# Patient Record
Sex: MALE | Race: ASIAN | NOT HISPANIC OR LATINO | Employment: FULL TIME | URBAN - METROPOLITAN AREA
[De-identification: names, ages, dates, MRNs, and addresses within clinical notes are randomized per-mention and may not be internally consistent; named-entity substitution may affect disease eponyms.]

---

## 2017-01-07 ENCOUNTER — TELEPHONE (OUTPATIENT)
Dept: MEDICAL GROUP | Facility: MEDICAL CENTER | Age: 42
End: 2017-01-07

## 2017-01-07 NOTE — TELEPHONE ENCOUNTER
(1) we requested an overnight pulse oximetry apnea link study on the patient and that was faxed to [facility] on June 4, 2016.  I did not receive any notification whether or not the study was performed or not, I did not receive any report.  Please contact [facility] and asked to speak to an  regarding this.    (2) we have received 3 request from the patient's insurance company regarding release of records to them from June 2016 forward, I did not sign off on any of those requests and they were scanned into the medial section.  I am not sure if these requests were ever forwarded to the medical release department.    (3) please check with the medical records department to see if they received the release requests for the pertinent records, these are in the media section.  The patient sent me a [employer] message inquiring about the records release, and why we have not released those records to his insurance company, in particular with regards to his overnight sleep study from [facility].

## 2017-01-09 ENCOUNTER — TELEPHONE (OUTPATIENT)
Dept: MEDICAL GROUP | Facility: MEDICAL CENTER | Age: 42
End: 2017-01-09

## 2017-01-09 NOTE — TELEPHONE ENCOUNTER
11/7/16 bilateral heel pain ×2 months, x-rays pending, labs pending, trial of Aleve 2 tablets twice a day over-the-counter  11/9/16 xray calcaneus bilateral small right posterior calcaneal spur and left posterior calcaneal spur  11/9/16 uric 6.4

## 2017-01-09 NOTE — TELEPHONE ENCOUNTER
Please notify patient that his sleep test done by [facility] in july was normal, and we apologize that the company did not send us the results.    We will send this information to his insurance company

## 2017-01-09 NOTE — TELEPHONE ENCOUNTER
1) Spoke with [facility]. They are faxing over results STAT.    2)  is contacting Med Records to see why this request has not been processed.

## 2017-04-06 ENCOUNTER — OFFICE VISIT (OUTPATIENT)
Dept: MEDICAL GROUP | Facility: MEDICAL CENTER | Age: 42
End: 2017-04-06
Payer: COMMERCIAL

## 2017-04-06 VITALS
SYSTOLIC BLOOD PRESSURE: 128 MMHG | TEMPERATURE: 97.5 F | DIASTOLIC BLOOD PRESSURE: 74 MMHG | WEIGHT: 214 LBS | HEART RATE: 75 BPM | HEIGHT: 73 IN | OXYGEN SATURATION: 95 % | BODY MASS INDEX: 28.36 KG/M2

## 2017-04-06 DIAGNOSIS — B34.9 VIRAL SYNDROME: ICD-10-CM

## 2017-04-06 PROCEDURE — 99213 OFFICE O/P EST LOW 20 MIN: CPT | Performed by: INTERNAL MEDICINE

## 2017-04-06 ASSESSMENT — PATIENT HEALTH QUESTIONNAIRE - PHQ9: CLINICAL INTERPRETATION OF PHQ2 SCORE: 0

## 2017-04-06 NOTE — Clinical Note
April 6, 2017        Dear Sir or ,      Please excuse Orlando Mercer from work April 7th through April 9th, due to a medical illness.            Sincerely,        Dheerajhector Burr M.D.    Electronically Signed

## 2017-04-06 NOTE — PROGRESS NOTES
"Subjective:      Orlando Mercer is a 41 y.o. male who presents with sore throat        HPI    Over the weekend started to have sore throat, and body aches, chills, runny nose, cough has resolved, dry cough, no hemoptysis, some sinus congestion, no sob, no ear pressure, no dizziness. No tinnitus. Also has had stomach tightness epigastric area which has gone away and more just queasy, would be more noticeable with meals, some bloating at times, tried pepto bismol and gas-ex, has noticed the GI symptoms this week, did have some loose stools this week, formed stool yesterday no blood, prior to this bowel movements have been fine, has been eating out a lot more  Able to keep liquids down   More weakness and fatigue, has been sleeping more this week and decrease energy  Did try allergy otc med and nasal spray no benefit  Sick exposures at work  No recent antibiotics          Current Outpatient Prescriptions   Medication Sig Dispense Refill   • meclizine (ANTIVERT) 25 MG Tab Take 1 Tab by mouth 3 times a day as needed for Vertigo. 12 Tab 2   • diazepam (VALIUM) 5 MG Tab Take 1 Tab by mouth every 6 hours as needed (vertigo). 10 Tab 0     No current facility-administered medications for this visit.       Patient Active Problem List    Diagnosis Date Noted   • Refractive amblyopia 04/13/2016   • Erectile dysfunction 12/28/2015   • Dyslipidemia 09/18/2011   • Preventative health care 09/16/2011   • Family history of brain aneurysm 05/27/2010   • Tension headache 06/05/2009     Depression Screening    Little interest or pleasure in doing things?  0 - not at all  Feeling down, depressed , or hopeless? 0 - not at all  Patient Health Questionnaire Score: 0     ROS       Objective:     /74 mmHg  Pulse 75  Temp(Src) 36.4 °C (97.5 °F)  Ht 1.854 m (6' 0.99\")  Wt 97.07 kg (214 lb)  BMI 28.24 kg/m2  SpO2 95%     Physical Exam   Constitutional: He appears well-developed and well-nourished. No distress.   HENT:   Head: " Normocephalic and atraumatic.   Right Ear: External ear normal.   Left Ear: External ear normal.   Nose: Nose normal.   Mouth/Throat: Oropharynx is clear and moist.   Eyes: Conjunctivae are normal. Right eye exhibits no discharge. Left eye exhibits no discharge. No scleral icterus.   Neck: Neck supple. No JVD present. No thyromegaly present.   Cardiovascular: Normal rate and regular rhythm.    Pulmonary/Chest: Effort normal and breath sounds normal. No respiratory distress. He has no wheezes.   Abdominal: Soft. Bowel sounds are normal. He exhibits no distension and no mass.   Skin: Skin is dry. No rash noted. He is not diaphoretic.   Psychiatric: He has a normal mood and affect. His behavior is normal.   Nursing note and vitals reviewed.              Assessment/Plan:     Assessment  #1 resolving viral syndrome, no evidence of sinusitis, pneumonia, otitis, pharyngitis, GI symptoms are resolving as well as no evidence of acute abdomen, cholecystitis or appendicitis      Plan  #1 rest, hydration, off work ×3 days    #2 no antibiotics    #3 notify us if symptoms recur

## 2017-06-22 ENCOUNTER — TELEPHONE (OUTPATIENT)
Dept: MEDICAL GROUP | Facility: MEDICAL CENTER | Age: 42
End: 2017-06-22

## 2017-06-23 NOTE — TELEPHONE ENCOUNTER
Spoke to Pt, he was having diarrhea for over 48 hours with no other symptoms. Pt took Imodium and reports he did not have a bowel movement last night as well as nothing as of yet this morning. Pt understanding of need to be seen at Urgent care if any other symptoms occur (fever, nausea, vomiting or abdominal pain). Pt will call office with any further needs and reports he is staying hydrated.

## 2017-06-23 NOTE — TELEPHONE ENCOUNTER
"\"Hi Dr Burr. I'm writing on behalf of Orlando MADISON Ruslan 1975. He does not have MyChart.   We're in [place] & Orlando has been having diarrhea since Tuesday this week. You can also speak to him directly at [phone].     The pharmacy here is [facility]: [address], [place] [address]. Phone:[phone]  The soonest you're able to see him is in Tuesday next week. Thank you so much!\"     We received this message from the patient's girlfriend in her [employer] account.  Please call the patient, and ask him if he is having nausea, vomiting, abdominal pain, fevers.  If so he should be seen at the walk-in clinic or urgent care in [place].  If it is just loose stools he can try over-the-counter Imodium, making sure to hydrate with Pedialyte.  "

## 2017-08-01 ENCOUNTER — TELEPHONE (OUTPATIENT)
Dept: MEDICAL GROUP | Facility: MEDICAL CENTER | Age: 42
End: 2017-08-01

## 2017-08-01 ENCOUNTER — OFFICE VISIT (OUTPATIENT)
Dept: MEDICAL GROUP | Facility: MEDICAL CENTER | Age: 42
End: 2017-08-01
Payer: COMMERCIAL

## 2017-08-01 VITALS
TEMPERATURE: 97.9 F | BODY MASS INDEX: 30.48 KG/M2 | WEIGHT: 225 LBS | HEIGHT: 72 IN | HEART RATE: 65 BPM | OXYGEN SATURATION: 96 % | DIASTOLIC BLOOD PRESSURE: 76 MMHG | SYSTOLIC BLOOD PRESSURE: 120 MMHG

## 2017-08-01 DIAGNOSIS — H53.8 BLURRY VISION: ICD-10-CM

## 2017-08-01 DIAGNOSIS — G44.209 TENSION HEADACHE: Chronic | ICD-10-CM

## 2017-08-01 DIAGNOSIS — J30.1 ALLERGIC RHINITIS DUE TO POLLEN, UNSPECIFIED RHINITIS SEASONALITY: ICD-10-CM

## 2017-08-01 DIAGNOSIS — H53.9 VISION DISTURBANCE: ICD-10-CM

## 2017-08-01 DIAGNOSIS — G44.209 TENSION-TYPE HEADACHE, NOT INTRACTABLE, UNSPECIFIED CHRONICITY PATTERN: ICD-10-CM

## 2017-08-01 DIAGNOSIS — Z00.00 PREVENTATIVE HEALTH CARE: Chronic | ICD-10-CM

## 2017-08-01 PROBLEM — J30.9 ALLERGIC RHINITIS: Status: ACTIVE | Noted: 2017-08-01

## 2017-08-01 PROCEDURE — 99214 OFFICE O/P EST MOD 30 MIN: CPT | Performed by: INTERNAL MEDICINE

## 2017-08-01 RX ORDER — NORTRIPTYLINE HYDROCHLORIDE 10 MG/1
10 CAPSULE ORAL EVERY EVENING
Qty: 30 CAP | Refills: 3 | Status: SHIPPED | OUTPATIENT
Start: 2017-08-01 | End: 2018-05-11

## 2017-08-01 RX ORDER — IPRATROPIUM BROMIDE 21 UG/1
2 SPRAY, METERED NASAL 2 TIMES DAILY
Qty: 1 BOTTLE | Refills: 6 | Status: SHIPPED | OUTPATIENT
Start: 2017-08-01 | End: 2018-06-09

## 2017-08-01 ASSESSMENT — ENCOUNTER SYMPTOMS
BLURRED VISION: 1
HEADACHES: 1

## 2017-08-01 NOTE — PROGRESS NOTES
Subjective:      Orlando Mercer is a 41 y.o. male who presents with Headache and Blurred Vision            Headache   Associated symptoms include blurred vision.   Blurred Vision  Associated symptoms include headaches.       Review of Systems   Eyes: Positive for blurred vision.   Neurological: Positive for headaches.     Has been having more headaches neck area bilateral with some radiation to the scalp, temporally will have headaches more noticeable in mornings, no new pillow or mattress, no radiation down the arms, no sensory changes hands or feet. More vision changes left eye, chronic right eye blurriness, no visual loss, no eye pain, no double vision, no glasses or contacts. Has had three episodes with blurry vision occuring that resolves.  No associated dizziness.  No temperature change.  No hearing loss.  One episode when occurred flashing light with no visual loss, no nausea, no photophobia, with no subsequent headache, then lay down and symptoms resolve. No migraine headache history. Headaches will feel like tension headache, no sharp, no associated nausea, will occur 5 days per week, no precipitating factors, has tried aleve or motrin or tylenol. Headaches seem to occur more frequently at work. Did change pillows recently. 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    No difficulty with balance or coordination  No falls  No sensory changes  No bowel or bladder incontinence   No diff with speech or swallow   No trauma, no fever, no sore throat  Has been having more allergies hay fever runny nose and sinus congestion, tried nasal spray otc flonase no benefit, tried oral antihistamine no benefit  Dental work recently last 6 months, also tooth grinding and uses mouth guard infrequently due to poor fit, bought an otc mouthguard since fit better, no noticeable affect of tooth grinding with headaches    No history of seizures, no history current concussions, no mental status changes  No difficulty with  "memory and focus  No regular alcohol           Objective:     /76 mmHg  Pulse 65  Temp(Src) 36.6 °C (97.9 °F)  Ht 1.829 m (6' 0.01\")  Wt 102.059 kg (225 lb)  BMI 30.51 kg/m2  SpO2 96%     Physical Exam   Constitutional: He appears well-developed and well-nourished. No distress.   HENT:   Head: Normocephalic and atraumatic.   Right Ear: External ear normal.   Left Ear: External ear normal.   Mouth/Throat: Oropharynx is clear and moist.   Eyes: Conjunctivae and EOM are normal. Pupils are equal, round, and reactive to light. Right eye exhibits no discharge. Left eye exhibits no discharge. No scleral icterus.   Neck: Normal range of motion. Neck supple. No JVD present. No thyromegaly present.   Cardiovascular: Normal rate, regular rhythm and normal heart sounds.    No murmur heard.  Pulmonary/Chest: Effort normal and breath sounds normal. No respiratory distress. He has no wheezes.   Skin: Skin is warm. He is not diaphoretic.   Psychiatric: He has a normal mood and affect. His behavior is normal.   Nursing note and vitals reviewed.    Fundi without exudate  Extra ocular movements intact  Visual acuity to confrontation intact bilateral  No mastoid tenderness, no sinus tenderness, mild nasal congestion, epistaxis  Cranial nerves intact, normal finger-to-nose, negative Romberg  Normal strength upper and lower extremities, normal cervical range of motion, no cervical adenopathy or thyromegaly  No rash or shingles  Normal affect, insight, judgment  No tenderness to palpation cervical spine, mild tenderness occipital region bilateral  No TMJ tenderness          Assessment/Plan:     Assessment  #!  Tension headache to restarting back of the neck radiating to the midportion of his head, one episode of visual changes with shimmering rainbow like affect left eye question atypical migraine, no history of retinal detachment, that has not occurred since, some decreased visual acuity left eye times unrelated to headache, " no visual loss, no double vision, no sensory changes, no strength changes, difficulty with speech, swallowing, balance, no evidence of sinusitis, no focal neurologic deficits, possibly related to work stress    #2 left visual disturbance periodically, occasional blurry vision, no loss of vision, no double vision     #3 tooth grinding possibly underlying contributory factor to tension headache unable to tolerate mouthguard from dentist    #4 history of refractive amblyopia followed by ophthalmology    #5 allergic rhinitis has tried fluticasone and over-the-counter oral anti-histamines without benefit        Plan  #1 no imaging necessary at this time    #2 trial of nortriptyline 10 mg by mouth every evening, medication may cause dry eyes, dry mouth, constipation, daytime drowsiness, may take 3-4 weeks from benefit, notify us if side effects    #3 can use ibuprofen or Aleve for breakthrough headaches    #4 mouthguard, follow-up with dentist    #5 referral back to ophthalmology    #6 trial of Atrovent nasal spray 2 sprays each nostril twice a day, has tried over-the-counter Flonase and over-the-counter or antihistamines with no benefit    #7 notify us for worsening symptoms    #8 follow-up 2 months

## 2017-08-02 NOTE — TELEPHONE ENCOUNTER
Need old records from     [facility]  [address]  [address]  Phone: [phone]

## 2017-09-19 ENCOUNTER — HOSPITAL ENCOUNTER (OUTPATIENT)
Dept: LAB | Facility: MEDICAL CENTER | Age: 42
End: 2017-09-19
Payer: COMMERCIAL

## 2017-09-19 LAB
BDY FAT % MEASURED: 26.7 %
BP DIAS: 80 MMHG
BP SYS: 125 MMHG
CHOLEST SERPL-MCNC: 210 MG/DL (ref 100–199)
DIABETES HTDIA: NO
EVENT NAME HTEVT: NORMAL
FASTING HTFAS: YES
GLUCOSE SERPL-MCNC: 93 MG/DL (ref 65–99)
HDLC SERPL-MCNC: 42 MG/DL
HYPERTENSION HTHYP: NO
LDLC SERPL CALC-MCNC: 120 MG/DL
SCREENING LOC CITY HTCIT: NORMAL
SCREENING LOC STATE HTSTA: NORMAL
SCREENING LOCATION HTLOC: NORMAL
SMOKING HTSMO: NO
SUBSCRIBER ID HTSID: NORMAL
TRIGL SERPL-MCNC: 240 MG/DL (ref 0–149)

## 2017-09-19 PROCEDURE — 82947 ASSAY GLUCOSE BLOOD QUANT: CPT

## 2017-09-19 PROCEDURE — 36415 COLL VENOUS BLD VENIPUNCTURE: CPT

## 2017-09-19 PROCEDURE — 80061 LIPID PANEL: CPT

## 2017-09-19 PROCEDURE — S5190 WELLNESS ASSESSMENT BY NONPH: HCPCS

## 2017-10-02 ENCOUNTER — EH NON-PROVIDER (OUTPATIENT)
Dept: OCCUPATIONAL MEDICINE | Facility: CLINIC | Age: 42
End: 2017-10-02

## 2017-10-02 DIAGNOSIS — Z29.89 NEED FOR ISOLATION: ICD-10-CM

## 2017-10-02 PROCEDURE — 94375 RESPIRATORY FLOW VOLUME LOOP: CPT | Performed by: PREVENTIVE MEDICINE

## 2017-11-08 ENCOUNTER — IMMUNIZATION (OUTPATIENT)
Dept: OCCUPATIONAL MEDICINE | Facility: CLINIC | Age: 42
End: 2017-11-08

## 2017-11-08 DIAGNOSIS — Z23 NEED FOR VACCINATION: ICD-10-CM

## 2017-11-08 PROCEDURE — 90686 IIV4 VACC NO PRSV 0.5 ML IM: CPT | Performed by: PREVENTIVE MEDICINE

## 2017-12-04 ENCOUNTER — OFFICE VISIT (OUTPATIENT)
Dept: MEDICAL GROUP | Facility: MEDICAL CENTER | Age: 42
End: 2017-12-04
Payer: COMMERCIAL

## 2017-12-04 VITALS
OXYGEN SATURATION: 99 % | BODY MASS INDEX: 30.75 KG/M2 | HEIGHT: 72 IN | WEIGHT: 227 LBS | TEMPERATURE: 97.2 F | DIASTOLIC BLOOD PRESSURE: 70 MMHG | HEART RATE: 81 BPM | SYSTOLIC BLOOD PRESSURE: 128 MMHG

## 2017-12-04 DIAGNOSIS — J30.1 ALLERGIC RHINITIS DUE TO POLLEN, UNSPECIFIED CHRONICITY, UNSPECIFIED SEASONALITY: ICD-10-CM

## 2017-12-04 DIAGNOSIS — G44.209 TENSION HEADACHE: Chronic | ICD-10-CM

## 2017-12-04 DIAGNOSIS — M54.2 NECK PAIN: ICD-10-CM

## 2017-12-04 DIAGNOSIS — H53.9 VISION DISTURBANCE: ICD-10-CM

## 2017-12-04 PROCEDURE — 99213 OFFICE O/P EST LOW 20 MIN: CPT | Performed by: INTERNAL MEDICINE

## 2017-12-04 NOTE — PROGRESS NOTES
Subjective:      Orlando Mercer is a 42 y.o. male who presents with sinus          HPI  Has follow up pending with  ophthalmology will have occasional episodes of blurry vision both eyes and episodes of flashes periphery left side which may occur together, no visual loss, also clenches teeth and will grind teeth at night since had tooth crowns a few years ago, has mouth guard and will try to use the mouth guard but does not use the one from his dentist due to being ill fitting. Will have sinus congestion and runny nose off and on, has tried atrovent nasal with no benefit.   Off pamelor for tension headache start in back of head with no radiation down the arms, no sensory changes hands or face, some radiation to front of scalp at times, no fever or chills, no dizziness, no ear pain, no tinnitus, no hearing loss  Off atrovent nasal  No anxiety or depression  No history cervical radiculopathy  No tobacco, no alcohol    Current Outpatient Prescriptions   Medication Sig Dispense Refill   • ipratropium (ATROVENT) 0.03 % Solution Spray 2 Sprays in nose 2 times a day. 1 Bottle 6   • nortriptyline (PAMELOR) 10 MG Cap Take 1 Cap by mouth every evening. 30 Cap 3     No current facility-administered medications for this visit.      Patient Active Problem List   Diagnosis   • Tension headache   • Family history of brain aneurysm   • Preventative health care   • Dyslipidemia   • Erectile dysfunction   • Refractive amblyopia   • Allergic rhinitis       ADHD  11/18/14 positive screening test, offer psychotherapy and medications he declines    Dyslipidemia  9/11 chol 201,trig 179,hdl 37,ldl 128 start fish oil 2 bid  8/12 chol 183,trig 165,hdl 36,ldl 114  9/14/13 chol 208,trig 308,hdl 37,ldl 109  9/4/14 chol 210,trig 329,hdl 37,ldl 107  6/14/16 chol 220,trig 315,hdl 40,ldl 117  9/19/17 chol 210,trig 240,hdl 42,ldl 120     Family history brain aneurysm  In father  6/10 MRI/MRA brain negative, report mentioned abn right frontal, I  spoke to  on the phone who reviewed the scans, no sig abnormality    Preventative health  9/12 tdap  6/14/16 vit d 24 start 2000 units  7/5/16 apnea link negative    Refractive amblyopia  3/1/16  eye exam, also dry eyes     Tension headache  12/09 acupuncture referral, if no benefit 4 weeks consider MRI of brain.  6/10 MRI/MRA brain negative, report mentioned abnormal right frontal, I spoke to  on the phone who reviewed the scans, no significant abnormality  4/4/16 continued pain above the left orbit only tender with palpation, has seen [facility], negative examination, will get CT orbit  4/18/16 CT orbit sella without plus reconstruction negative  8/1/17 start pamelor 10 mg            Health Maintenance Summary                IMM DTaP/Tdap/Td Vaccine Next Due 9/21/2022      Done 9/21/2012 Imm Admin: Tdap Vaccine          Patient Care Team:  Dheeraj Burr M.D. as PCP - General            ROS       Objective:        Physical Exam   Constitutional: He appears well-developed and well-nourished. No distress.   HENT:   Head: Normocephalic and atraumatic.   Right Ear: External ear normal.   Left Ear: External ear normal.   Nose: Nose normal.   Mouth/Throat: Oropharynx is clear and moist. No oropharyngeal exudate.   Eyes: Conjunctivae and EOM are normal. Pupils are equal, round, and reactive to light. Right eye exhibits no discharge. Left eye exhibits no discharge. No scleral icterus.   Neck: Neck supple. No JVD present.   Cardiovascular: Normal rate, regular rhythm and normal heart sounds.    Pulmonary/Chest: Effort normal and breath sounds normal. No respiratory distress. He has no wheezes.   Abdominal: He exhibits no distension.   Musculoskeletal: He exhibits no edema.   Neurological: He is alert. No cranial nerve deficit. Coordination normal.   Skin: Skin is warm. He is not diaphoretic.   Psychiatric: He has a normal mood and affect. His behavior is normal.   Nursing note and vitals  reviewed.    No sinus tenderness, no mastoid tenderness, no TMJ tenderness  Normal cervical range of motion  Cranial nerves intact  No tenderness to palpation trapezius, occipital, paraspinal cervical muscles          Assessment/Plan:     Assessment  #! Vision disturbance occasional blurry vision, has upcoming appointment with ophthalmology     #2 teeth clenching and occasional tooth grinding, has mouth guard at home    #3 neck pain musculoskeletal in nature    #4 tension headache , tried Pamelor no benefit    #5 allergic rhinitis has tried Atrovent, Nasonex, Astelin    Plan  #1 follow up  ophthalmology    #2 follow-up with dentist, consider different mouthguard    #3 acupuncture referral teeth clenching, neck pain, tension headache, allergic rhinitis    #4 nasal rinse    #5 declines Singulair    #6 meditation, relaxation therapy, massage therapy    #7 follow-up 3 months

## 2018-02-07 ENCOUNTER — HOSPITAL ENCOUNTER (OUTPATIENT)
Dept: RADIOLOGY | Facility: MEDICAL CENTER | Age: 43
End: 2018-02-07
Attending: OPHTHALMOLOGY
Payer: COMMERCIAL

## 2018-02-07 DIAGNOSIS — G43.001 MIGRAINE WITHOUT AURA AND WITH STATUS MIGRAINOSUS, NOT INTRACTABLE: ICD-10-CM

## 2018-02-07 PROCEDURE — A9577 INJ MULTIHANCE: HCPCS | Performed by: OPHTHALMOLOGY

## 2018-02-07 PROCEDURE — 700117 HCHG RX CONTRAST REV CODE 255: Performed by: OPHTHALMOLOGY

## 2018-02-07 PROCEDURE — 70553 MRI BRAIN STEM W/O & W/DYE: CPT

## 2018-02-07 RX ADMIN — GADOBENATE DIMEGLUMINE 10 ML: 529 INJECTION, SOLUTION INTRAVENOUS at 12:15

## 2018-02-14 ENCOUNTER — PATIENT MESSAGE (OUTPATIENT)
Dept: MEDICAL GROUP | Facility: MEDICAL CENTER | Age: 43
End: 2018-02-14

## 2018-02-14 DIAGNOSIS — H53.9 VISION DISTURBANCE: ICD-10-CM

## 2018-02-15 NOTE — TELEPHONE ENCOUNTER
From: Gurmeet Mercer  To: Dheeraj Burr M.D.  Sent: 2/14/2018 11:33 AM PST  Subject: Non-Urgent Medical Question    Hi Dr Burr,  I have an appt to check back with Dr Cecelia Gutierrez but I guess my original referral was only for one visit? Please send for additional visit      Thank you

## 2018-04-17 ENCOUNTER — OFFICE VISIT (OUTPATIENT)
Dept: URGENT CARE | Facility: CLINIC | Age: 43
End: 2018-04-17
Payer: COMMERCIAL

## 2018-04-17 VITALS
SYSTOLIC BLOOD PRESSURE: 120 MMHG | OXYGEN SATURATION: 98 % | BODY MASS INDEX: 30.61 KG/M2 | HEART RATE: 72 BPM | DIASTOLIC BLOOD PRESSURE: 80 MMHG | RESPIRATION RATE: 18 BRPM | WEIGHT: 226 LBS | HEIGHT: 72 IN | TEMPERATURE: 98 F

## 2018-04-17 DIAGNOSIS — J02.9 SORE THROAT: ICD-10-CM

## 2018-04-17 DIAGNOSIS — J02.9 VIRAL PHARYNGITIS: ICD-10-CM

## 2018-04-17 LAB
INT CON NEG: NORMAL
INT CON POS: NORMAL
S PYO AG THROAT QL: NEGATIVE

## 2018-04-17 PROCEDURE — 99213 OFFICE O/P EST LOW 20 MIN: CPT | Performed by: NURSE PRACTITIONER

## 2018-04-17 PROCEDURE — 87880 STREP A ASSAY W/OPTIC: CPT | Performed by: NURSE PRACTITIONER

## 2018-04-17 ASSESSMENT — ENCOUNTER SYMPTOMS
FEVER: 0
COUGH: 0
SWOLLEN GLANDS: 1
HOARSE VOICE: 1
SORE THROAT: 1

## 2018-04-17 NOTE — PROGRESS NOTES
Subjective:      Gurmeet Mercer is a 42 y.o. male who presents with Pharyngitis (Couple days sorethroat )            Pharyngitis    This is a new problem. Episode onset: Pt reports onset of ST 2 days ago. He admits his throat is the most painful in the evening and first thing in the morning. He is unsure if he has ever had strep. Denies any fever. He has not taken anything for the pain. The problem has been unchanged. Neither side of throat is experiencing more pain than the other. There has been no fever. Associated symptoms include a hoarse voice and swollen glands. Pertinent negatives include no congestion or coughing.       Review of Systems   Constitutional: Negative for fever.   HENT: Positive for hoarse voice and sore throat. Negative for congestion.    Respiratory: Negative for cough.    All other systems reviewed and are negative.    Past Medical History:   Diagnosis Date   • Plantar fasciitis 6/8/2009   • Tension headache 6/5/2009    No past surgical history on file.   Social History     Social History   • Marital status:      Spouse name: N/A   • Number of children: N/A   • Years of education: N/A     Occupational History   • Not on file.     Social History Main Topics   • Smoking status: Former Smoker   • Smokeless tobacco: Never Used   • Alcohol use 0.0 oz/week   • Drug use: No   • Sexual activity: Not on file     Other Topics Concern   • Not on file     Social History Narrative   • No narrative on file          Objective:     /80   Pulse 72   Temp 36.7 °C (98 °F)   Resp 18   Ht 1.829 m (6')   Wt 102.5 kg (226 lb)   SpO2 98%   BMI 30.65 kg/m²      Physical Exam   Constitutional: He is oriented to person, place, and time. Vital signs are normal. He appears well-developed and well-nourished.   HENT:   Head: Normocephalic and atraumatic.   Right Ear: Tympanic membrane and external ear normal.   Left Ear: Tympanic membrane and external ear normal.   Nose: Rhinorrhea present.    Mouth/Throat: Posterior oropharyngeal erythema present. No oropharyngeal exudate or posterior oropharyngeal edema.   Eyes: EOM are normal. Pupils are equal, round, and reactive to light.   Neck: Normal range of motion.   Cardiovascular: Normal rate and regular rhythm.    Pulmonary/Chest: Effort normal.   Musculoskeletal: Normal range of motion.   Lymphadenopathy:        Head (right side): Submandibular adenopathy present.        Head (left side): Submandibular adenopathy present.   Neurological: He is alert and oriented to person, place, and time.   Skin: Skin is warm and dry. Capillary refill takes less than 2 seconds.   Psychiatric: He has a normal mood and affect. His speech is normal and behavior is normal. Thought content normal.   Vitals reviewed.              Assessment/Plan:     1. Sore throat  - POCT Rapid Strep A NEGATIVE    2. Viral pharyngitis    Discussed with patient his symptoms are viral in nature at this time.  His sore throat in the morning is likely due to PND and breathing through his mouth at night   Encouraged warm salt water gargles, tylenol and ibuprofen for pain  Throat lozenges  Good hand washing  Supportive care, differential diagnoses, and indications for immediate follow-up discussed with patient.    Pathogenesis of diagnosis discussed including typical length and natural progression.      Instructed to return to  or nearest emergency department if symptoms fail to improve, for any change in condition, further concerns, or new concerning symptoms.  Patient states understanding of the plan of care and discharge instructions.

## 2018-05-11 ENCOUNTER — OFFICE VISIT (OUTPATIENT)
Dept: URGENT CARE | Facility: CLINIC | Age: 43
End: 2018-05-11
Payer: COMMERCIAL

## 2018-05-11 VITALS
WEIGHT: 220 LBS | SYSTOLIC BLOOD PRESSURE: 118 MMHG | OXYGEN SATURATION: 96 % | HEART RATE: 72 BPM | DIASTOLIC BLOOD PRESSURE: 74 MMHG | BODY MASS INDEX: 29.8 KG/M2 | RESPIRATION RATE: 18 BRPM | HEIGHT: 72 IN | TEMPERATURE: 96.5 F

## 2018-05-11 DIAGNOSIS — J32.9 RHINOSINUSITIS: ICD-10-CM

## 2018-05-11 DIAGNOSIS — J98.8 RTI (RESPIRATORY TRACT INFECTION): ICD-10-CM

## 2018-05-11 PROCEDURE — 99214 OFFICE O/P EST MOD 30 MIN: CPT | Performed by: FAMILY MEDICINE

## 2018-05-11 RX ORDER — PREDNISONE 20 MG/1
40 TABLET ORAL EVERY MORNING
Qty: 12 TAB | Refills: 0 | Status: SHIPPED | OUTPATIENT
Start: 2018-05-11 | End: 2018-05-12

## 2018-05-11 RX ORDER — AMOXICILLIN AND CLAVULANATE POTASSIUM 875; 125 MG/1; MG/1
1 TABLET, FILM COATED ORAL 2 TIMES DAILY
Qty: 14 TAB | Refills: 0 | Status: SHIPPED | OUTPATIENT
Start: 2018-05-11 | End: 2018-05-12

## 2018-05-11 RX ORDER — PROMETHAZINE HYDROCHLORIDE, PHENYLEPHRINE HYDROCHLORIDE AND CODEINE PHOSPHATE 6.25; 5; 1 MG/5ML; MG/5ML; MG/5ML
5 SOLUTION ORAL EVERY 6 HOURS PRN
Qty: 120 ML | Refills: 0 | Status: SHIPPED | OUTPATIENT
Start: 2018-05-11 | End: 2018-05-12

## 2018-05-11 ASSESSMENT — ENCOUNTER SYMPTOMS
DIZZINESS: 0
FEVER: 0
COUGH: 1
SINUS PAIN: 1
HEMOPTYSIS: 0
FOCAL WEAKNESS: 0
CHILLS: 0
SHORTNESS OF BREATH: 0
ORTHOPNEA: 0

## 2018-05-12 NOTE — PROGRESS NOTES
Subjective:      Gurmeet Mercer is a 42 y.o. male who presents with Fatigue (cough, runny nose, congestion x 2 weeks )    Chief Complaint   Patient presents with   • Fatigue     cough, runny nose, congestion x 2 weeks         - This is a very pleasant 42 y.o. male with complaints of 2wks w/ sinus pain pressure dc and cough, ears clogged. No NVFC          ALLERGIES:  Sulfa drugs     PMH:  Past Medical History:   Diagnosis Date   • Plantar fasciitis 6/8/2009   • Tension headache 6/5/2009        MEDS:    Current Outpatient Prescriptions:   •  Promethazine-Phenyleph-Codeine (PHENERGAN VC CODEINE) 6.25-5-10 MG/5ML Syrup, Take 5 mL by mouth every 6 hours as needed for up to 14 days., Disp: 120 mL, Rfl: 0  •  predniSONE (DELTASONE) 20 MG Tab, Take 2 Tabs by mouth every morning for 6 days., Disp: 12 Tab, Rfl: 0  •  amoxicillin-clavulanate (AUGMENTIN) 875-125 MG Tab, Take 1 Tab by mouth 2 times a day for 7 days., Disp: 14 Tab, Rfl: 0  •  Lifitegrast (XIIDRA) 5 % Solution, by Ophthalmic route., Disp: , Rfl:   •  ipratropium (ATROVENT) 0.03 % Solution, Spray 2 Sprays in nose 2 times a day., Disp: 1 Bottle, Rfl: 6    ** I have documented what I find to be significant in regards to past medical, social, family and surgical history  in my HPI or under PMH/PSH/FH review section, otherwise it is contributory **           HPI    Review of Systems   Constitutional: Negative for chills and fever.   HENT: Positive for congestion, ear pain and sinus pain.    Respiratory: Positive for cough. Negative for hemoptysis and shortness of breath.    Cardiovascular: Negative for chest pain and orthopnea.   Neurological: Negative for dizziness and focal weakness.          Objective:     /74   Pulse 72   Temp 35.8 °C (96.5 °F)   Resp 18   Ht 1.829 m (6')   Wt 99.8 kg (220 lb)   SpO2 96%   BMI 29.84 kg/m²      Physical Exam   Constitutional: He appears well-developed. No distress.   HENT:   Head: Normocephalic and  atraumatic.   Mouth/Throat: Oropharynx is clear and moist.   Eyes: Conjunctivae are normal.   Neck: Neck supple.   Cardiovascular: Regular rhythm.    No murmur heard.  Pulmonary/Chest: Effort normal and breath sounds normal. No respiratory distress.   Neurological: He is alert. He exhibits normal muscle tone.   Skin: Skin is warm and dry.   Psychiatric: He has a normal mood and affect. Judgment normal.   Nursing note and vitals reviewed.              Assessment/Plan:         1. RTI (respiratory tract infection)  Promethazine-Phenyleph-Codeine (PHENERGAN VC CODEINE) 6.25-5-10 MG/5ML Syrup   2. Rhinosinusitis  predniSONE (DELTASONE) 20 MG Tab    amoxicillin-clavulanate (AUGMENTIN) 875-125 MG Tab             Dx & d/c instructions discussed w/ patient and/or family members. Follow up w/ Prvt Dr or here in 3-4 days if not getting better, sooner if needed,  ER if worse and UC/PCP unavailable.        Possible side effects (i.e. Rash, GI upset/constipation, sedation, elevation of BP or sugars) of any medications given discussed.

## 2018-06-09 ENCOUNTER — OFFICE VISIT (OUTPATIENT)
Dept: URGENT CARE | Facility: CLINIC | Age: 43
End: 2018-06-09
Payer: COMMERCIAL

## 2018-06-09 VITALS
TEMPERATURE: 98.2 F | HEIGHT: 73 IN | WEIGHT: 230 LBS | DIASTOLIC BLOOD PRESSURE: 76 MMHG | OXYGEN SATURATION: 94 % | HEART RATE: 70 BPM | BODY MASS INDEX: 30.48 KG/M2 | SYSTOLIC BLOOD PRESSURE: 124 MMHG

## 2018-06-09 DIAGNOSIS — E66.9 OBESITY (BMI 30-39.9): ICD-10-CM

## 2018-06-09 DIAGNOSIS — K21.9 GASTROESOPHAGEAL REFLUX DISEASE WITHOUT ESOPHAGITIS: Primary | ICD-10-CM

## 2018-06-09 PROCEDURE — 99214 OFFICE O/P EST MOD 30 MIN: CPT | Performed by: PHYSICIAN ASSISTANT

## 2018-06-09 RX ORDER — OMEPRAZOLE 20 MG/1
20 CAPSULE, DELAYED RELEASE ORAL 2 TIMES DAILY
Qty: 20 CAP | Refills: 0 | Status: SHIPPED | OUTPATIENT
Start: 2018-06-09 | End: 2018-06-19

## 2018-06-09 NOTE — PATIENT INSTRUCTIONS
Heartburn  Introduction  Heartburn is a type of pain or discomfort that can happen in the throat or chest. It is often described as a burning pain. It may also cause a bad taste in the mouth. Heartburn may feel worse when you lie down or bend over. It may be caused by stomach contents that move back up (reflux) into the tube that connects the mouth with the stomach (esophagus).  Follow these instructions at home:  Take these actions to lessen your discomfort and to help avoid problems.  Diet  · Follow a diet as told by your doctor. You may need to avoid foods and drinks such as:  ¨ Coffee and tea (with or without caffeine).  ¨ Drinks that contain alcohol.  ¨ Energy drinks and sports drinks.  ¨ Carbonated drinks or sodas.  ¨ Chocolate and cocoa.  ¨ Peppermint and mint flavorings.  ¨ Garlic and onions.  ¨ Horseradish.  ¨ Spicy and acidic foods, such as peppers, chili powder, stewart powder, vinegar, hot sauces, and BBQ sauce.  ¨ Citrus fruit juices and citrus fruits, such as oranges, reji, and limes.  ¨ Tomato-based foods, such as red sauce, chili, salsa, and pizza with red sauce.  ¨ Fried and fatty foods, such as donuts, french fries, potato chips, and high-fat dressings.  ¨ High-fat meats, such as hot dogs, rib eye steak, sausage, ham, and clark.  ¨ High-fat dairy items, such as whole milk, butter, and cream cheese.  · Eat small meals often. Avoid eating large meals.  · Avoid drinking large amounts of liquid with your meals.  · Avoid eating meals during the 2-3 hours before bedtime.  · Avoid lying down right after you eat.  · Do not exercise right after you eat.  General instructions  · Pay attention to any changes in your symptoms.  · Take over-the-counter and prescription medicines only as told by your doctor. Do not take aspirin, ibuprofen, or other NSAIDs unless your doctor says it is okay.  · Do not use any tobacco products, including cigarettes, chewing tobacco, and e-cigarettes. If you need help quitting, ask  your doctor.  · Wear loose clothes. Do not wear anything tight around your waist.  · Raise (elevate) the head of your bed about 6 inches (15 cm).  · Try to lower your stress. If you need help doing this, ask your doctor.  · If you are overweight, lose an amount of weight that is healthy for you. Ask your doctor about a safe weight loss goal.  · Keep all follow-up visits as told by your doctor. This is important.  Contact a doctor if:  · You have new symptoms.  · You lose weight and you do not know why it is happening.  · You have trouble swallowing, or it hurts to swallow.  · You have wheezing or a cough that keeps happening.  · Your symptoms do not get better with treatment.  · You have heartburn often for more than two weeks.  Get help right away if:  · You have pain in your arms, neck, jaw, teeth, or back.  · You feel sweaty, dizzy, or light-headed.  · You have chest pain or shortness of breath.  · You throw up (vomit) and your throw up looks like blood or coffee grounds.  · Your poop (stool) is bloody or black.  This information is not intended to replace advice given to you by your health care provider. Make sure you discuss any questions you have with your health care provider.  Document Released: 08/29/2012 Document Revised: 05/25/2017 Document Reviewed: 04/13/2016  © 2017 [employer]

## 2018-06-09 NOTE — PROGRESS NOTES
"Subjective:      Pt is a 42 y.o. male who presents with Heartburn (neck/jaw pain and radiating pain on right arm )            HPI  Pt notes episode of strong heartburn last night which radiated into his right jaw and right arm and continues to last with the symptoms of burning in his stomach. Pt has not taken any Rx medications for this condition. Pt states the pain is a 7-8/10, aching in nature and worse last night. Pt notes intermittent heartburn but nothing \"like this\" and notes spicy hot wings for lunch yesterday may have been the trigger. Pt denies CP, SOB, NVD, paresthesias, headaches, dizziness, change in vision, hives, or other joint pain. The pt's medication list, problem list, and allergies have been evaluated and reviewed during today's visit.    PMH:  Past Medical History:   Diagnosis Date   • Plantar fasciitis 6/8/2009   • Tension headache 6/5/2009       PSH:  Negative per pt.      Fam Hx:    family history includes Heart Disease in his father; Hyperlipidemia in his mother; Hypertension in his mother.  Family Status   Relation Status   • Mother Alive   • Father Alive   • Sister Alive       Soc HX:  Social History     Social History   • Marital status:      Spouse name: N/A   • Number of children: N/A   • Years of education: N/A     Occupational History   • Not on file.     Social History Main Topics   • Smoking status: Former Smoker   • Smokeless tobacco: Never Used   • Alcohol use 0.0 oz/week   • Drug use: No   • Sexual activity: Not on file     Other Topics Concern   • Not on file     Social History Narrative   • No narrative on file         Medications:    Current Outpatient Prescriptions:   •  omeprazole (PRILOSEC) 20 MG delayed-release capsule, Take 1 Cap by mouth 2 times a day for 10 days., Disp: 20 Cap, Rfl: 0  •  hyoscyamine-maalox plus-lidocaine viscous (GI COCKTAIL), Take 30 mL by mouth Once for 1 dose., Disp: 30 mL, Rfl: 0  •  Lifitegrast (XIIDRA) 5 % Solution, by Ophthalmic route., " "Disp: , Rfl:       Allergies:  Sulfa drugs    ROS  Constitutional: Negative for fever, chills and malaise/fatigue.   HENT: Negative for congestion and sore throat.    Eyes: Negative for blurred vision, double vision and photophobia.   Respiratory: Negative for cough and shortness of breath.    Cardiovascular: Negative for chest pain and palpitations.   Gastrointestinal: POS for heartburn radiating to right jaw and right arm, NEG nausea, vomiting, abdominal pain, diarrhea and constipation.   Genitourinary: Negative for dysuria and flank pain.   Musculoskeletal: NEG for joint pain and myalgias.   Skin: Negative for itching and rash.   Neurological: Negative for dizziness, tingling and headaches.   Endo/Heme/Allergies: Does not bruise/bleed easily.   Psychiatric/Behavioral: Negative for depression. The patient is not nervous/anxious.           Objective:     /76   Pulse 70   Temp 36.8 °C (98.2 °F)   Ht 1.854 m (6' 1\")   Wt 104.3 kg (230 lb)   SpO2 94%   BMI 30.34 kg/m²      Physical Exam   Abdominal: Soft. Normal appearance. He exhibits no shifting dullness, no distension, no pulsatile liver, no fluid wave, no abdominal bruit, no ascites, no pulsatile midline mass and no mass. Bowel sounds are increased. There is no hepatosplenomegaly. There is tenderness in the epigastric area. There is no rigidity, no rebound, no guarding, no CVA tenderness, no tenderness at McBurney's point and negative Persaud's sign. No hernia.             Constitutional: PT is oriented to person, place, and time. PT appears well-developed and well-nourished. No distress.   HENT:   Head: Normocephalic and atraumatic.   Mouth/Throat: Oropharynx is clear and moist. No oropharyngeal exudate.   Eyes: Conjunctivae normal and EOM are normal. Pupils are equal, round, and reactive to light.   Neck: Normal range of motion. Neck supple. No thyromegaly present.   Cardiovascular: Normal rate, regular rhythm, normal heart sounds and intact distal " pulses.  Exam reveals no gallop and no friction rub.    No murmur heard.  Pulmonary/Chest: Effort normal and breath sounds normal. No respiratory distress. PT has no wheezes. PT has no rales. Pt exhibits no tenderness.   Musculoskeletal: Normal range of motion. PT exhibits no edema and no tenderness.   Neurological: PT is alert and oriented to person, place, and time. PT has normal reflexes. No cranial nerve deficit.   Skin: Skin is warm and dry. No rash noted. PT is not diaphoretic. No erythema.       Psychiatric: PT has a normal mood and affect. PT behavior is normal. Judgment and thought content normal.          Assessment/Plan:     1. Gastroesophageal reflux disease without esophagitis    - EKG-->NSR  - omeprazole (PRILOSEC) 20 MG delayed-release capsule; Take 1 Cap by mouth 2 times a day for 10 days.  Dispense: 20 Cap; Refill: 0  - hyoscyamine-maalox plus-lidocaine viscous (GI COCKTAIL); Take 30 mL by mouth Once for 1 dose.  Dispense: 30 mL; Refill: 0    2. Obesity (BMI 30-39.9)    - Patient identified as having weight management issue.  Appropriate orders and counseling given.    Watch diet for triggers  Rest, fluids encouraged.  AVS with medical info given.  Pt was in full understanding and agreement with the plan.  Follow-up as needed if symptoms worsen or fail to improve.

## 2018-09-18 ENCOUNTER — HOSPITAL ENCOUNTER (OUTPATIENT)
Facility: MEDICAL CENTER | Age: 43
End: 2018-09-18
Payer: COMMERCIAL

## 2018-09-18 LAB
BDY FAT % MEASURED: 20.9 %
BP DIAS: 76 MMHG
BP SYS: 129 MMHG
CHOLEST SERPL-MCNC: 204 MG/DL (ref 100–199)
DIABETES HTDIA: NO
EVENT NAME HTEVT: NORMAL
FASTING HTFAS: YES
GLUCOSE SERPL-MCNC: 96 MG/DL (ref 65–99)
HDLC SERPL-MCNC: 40 MG/DL
HYPERTENSION HTHYP: NO
LDLC SERPL CALC-MCNC: 90 MG/DL
SCREENING LOC CITY HTCIT: NORMAL
SCREENING LOC STATE HTSTA: NORMAL
SCREENING LOCATION HTLOC: NORMAL
SMOKING HTSMO: NO
SUBSCRIBER ID HTSID: NORMAL
TRIGL SERPL-MCNC: 369 MG/DL (ref 0–149)

## 2018-09-18 PROCEDURE — 80061 LIPID PANEL: CPT

## 2018-09-18 PROCEDURE — S5190 WELLNESS ASSESSMENT BY NONPH: HCPCS

## 2018-09-18 PROCEDURE — 82947 ASSAY GLUCOSE BLOOD QUANT: CPT

## 2018-09-21 ENCOUNTER — DOCUMENTATION (OUTPATIENT)
Dept: OCCUPATIONAL MEDICINE | Facility: CLINIC | Age: 43
End: 2018-09-21

## 2018-09-24 ENCOUNTER — IMMUNIZATION (OUTPATIENT)
Dept: OCCUPATIONAL MEDICINE | Facility: CLINIC | Age: 43
End: 2018-09-24

## 2018-09-24 ENCOUNTER — EH NON-PROVIDER (OUTPATIENT)
Dept: OCCUPATIONAL MEDICINE | Facility: CLINIC | Age: 43
End: 2018-09-24

## 2018-09-24 DIAGNOSIS — Z02.89 ENCOUNTER FOR OCCUPATIONAL HEALTH EXAMINATION INVOLVING RESPIRATOR: Primary | ICD-10-CM

## 2018-09-24 DIAGNOSIS — Z23 NEED FOR VACCINATION: ICD-10-CM

## 2018-09-24 PROCEDURE — 94375 RESPIRATORY FLOW VOLUME LOOP: CPT | Performed by: PREVENTIVE MEDICINE

## 2018-10-02 PROCEDURE — 90686 IIV4 VACC NO PRSV 0.5 ML IM: CPT | Performed by: PREVENTIVE MEDICINE

## 2018-10-23 ENCOUNTER — APPOINTMENT (RX ONLY)
Dept: URBAN - METROPOLITAN AREA CLINIC 20 | Facility: CLINIC | Age: 43
Setting detail: DERMATOLOGY
End: 2018-10-23

## 2018-10-23 DIAGNOSIS — D22 MELANOCYTIC NEVI: ICD-10-CM

## 2018-10-23 DIAGNOSIS — L71.8 OTHER ROSACEA: ICD-10-CM

## 2018-10-23 DIAGNOSIS — L20.89 OTHER ATOPIC DERMATITIS: ICD-10-CM

## 2018-10-23 DIAGNOSIS — L81.4 OTHER MELANIN HYPERPIGMENTATION: ICD-10-CM

## 2018-10-23 DIAGNOSIS — D18.0 HEMANGIOMA: ICD-10-CM

## 2018-10-23 PROBLEM — D22.5 MELANOCYTIC NEVI OF TRUNK: Status: ACTIVE | Noted: 2018-10-23

## 2018-10-23 PROBLEM — D18.01 HEMANGIOMA OF SKIN AND SUBCUTANEOUS TISSUE: Status: ACTIVE | Noted: 2018-10-23

## 2018-10-23 PROBLEM — L20.84 INTRINSIC (ALLERGIC) ECZEMA: Status: ACTIVE | Noted: 2018-10-23

## 2018-10-23 PROCEDURE — ? COUNSELING

## 2018-10-23 PROCEDURE — 99203 OFFICE O/P NEW LOW 30 MIN: CPT

## 2018-10-23 PROCEDURE — ? PRESCRIPTION

## 2018-10-23 RX ORDER — TRIAMCINOLONE ACETONIDE 1 MG/G
CREAM TOPICAL BID
Qty: 1 | Refills: 3 | Status: ERX | COMMUNITY
Start: 2018-10-23

## 2018-10-23 RX ORDER — METRONIDAZOLE 7.5 MG/G
CREAM TOPICAL
Qty: 1 | Refills: 3 | Status: ERX | COMMUNITY
Start: 2018-10-23

## 2018-10-23 RX ADMIN — METRONIDAZOLE: 7.5 CREAM TOPICAL at 20:18

## 2018-10-23 RX ADMIN — TRIAMCINOLONE ACETONIDE: 1 CREAM TOPICAL at 20:24

## 2018-10-23 ASSESSMENT — LOCATION SIMPLE DESCRIPTION DERM
LOCATION SIMPLE: NOSE
LOCATION SIMPLE: LEFT CHEEK
LOCATION SIMPLE: CHEST
LOCATION SIMPLE: RIGHT HAND
LOCATION SIMPLE: LEFT FOREARM
LOCATION SIMPLE: ANTERIOR SCALP
LOCATION SIMPLE: LEFT HAND
LOCATION SIMPLE: RIGHT CHEEK
LOCATION SIMPLE: RIGHT FOREARM
LOCATION SIMPLE: RIGHT UPPER BACK

## 2018-10-23 ASSESSMENT — LOCATION DETAILED DESCRIPTION DERM
LOCATION DETAILED: RIGHT SUPERIOR MEDIAL UPPER BACK
LOCATION DETAILED: MID-FRONTAL SCALP
LOCATION DETAILED: RIGHT CENTRAL MALAR CHEEK
LOCATION DETAILED: LEFT INFERIOR CENTRAL MALAR CHEEK
LOCATION DETAILED: RIGHT MID-UPPER BACK
LOCATION DETAILED: LEFT MEDIAL SUPERIOR CHEST
LOCATION DETAILED: RIGHT PROXIMAL DORSAL FOREARM
LOCATION DETAILED: LEFT CENTRAL MALAR CHEEK
LOCATION DETAILED: NASAL TIP
LOCATION DETAILED: RIGHT DORSAL MIDDLE METACARPOPHALANGEAL JOINT
LOCATION DETAILED: LEFT PROXIMAL DORSAL FOREARM
LOCATION DETAILED: LEFT ULNAR DORSAL HAND

## 2018-10-23 ASSESSMENT — LOCATION ZONE DERM
LOCATION ZONE: SCALP
LOCATION ZONE: TRUNK
LOCATION ZONE: ARM
LOCATION ZONE: HAND
LOCATION ZONE: NOSE
LOCATION ZONE: FACE

## 2018-11-30 ENCOUNTER — OFFICE VISIT (OUTPATIENT)
Dept: NEUROLOGY | Facility: MEDICAL CENTER | Age: 43
End: 2018-11-30
Payer: COMMERCIAL

## 2018-11-30 VITALS
BODY MASS INDEX: 29.85 KG/M2 | OXYGEN SATURATION: 95 % | SYSTOLIC BLOOD PRESSURE: 122 MMHG | HEIGHT: 73 IN | WEIGHT: 225.2 LBS | TEMPERATURE: 97.8 F | RESPIRATION RATE: 17 BRPM | HEART RATE: 81 BPM | DIASTOLIC BLOOD PRESSURE: 68 MMHG

## 2018-11-30 DIAGNOSIS — H53.9 VISUAL CHANGES: ICD-10-CM

## 2018-11-30 DIAGNOSIS — R51.9 NONINTRACTABLE EPISODIC HEADACHE, UNSPECIFIED HEADACHE TYPE: ICD-10-CM

## 2018-11-30 PROBLEM — E66.9 OBESITY (BMI 30-39.9): Status: RESOLVED | Noted: 2018-06-09 | Resolved: 2018-11-30

## 2018-11-30 PROCEDURE — 99205 OFFICE O/P NEW HI 60 MIN: CPT | Performed by: PSYCHIATRY & NEUROLOGY

## 2018-11-30 RX ORDER — ACETAMINOPHEN 500 MG
500-1000 TABLET ORAL EVERY 6 HOURS PRN
COMMUNITY

## 2018-11-30 RX ORDER — TRIAMCINOLONE ACETONIDE 55 UG/1
2 SPRAY, METERED NASAL DAILY
COMMUNITY
End: 2021-05-21 | Stop reason: SDUPTHER

## 2018-11-30 RX ORDER — CYCLOSPORINE 0.5 MG/ML
1 EMULSION OPHTHALMIC 2 TIMES DAILY
COMMUNITY
End: 2020-08-29

## 2018-11-30 RX ORDER — OMEGA-3 FATTY ACIDS/FISH OIL 300-1000MG
CAPSULE ORAL
COMMUNITY

## 2018-11-30 ASSESSMENT — ENCOUNTER SYMPTOMS
PHOTOPHOBIA: 0
HEADACHES: 1
LOSS OF CONSCIOUSNESS: 0
MEMORY LOSS: 0
VOMITING: 0
DOUBLE VISION: 1
BLURRED VISION: 1
DEPRESSION: 0
NAUSEA: 0

## 2018-11-30 ASSESSMENT — PAIN SCALES - GENERAL: PAINLEVEL: NO PAIN

## 2018-11-30 ASSESSMENT — PATIENT HEALTH QUESTIONNAIRE - PHQ9: CLINICAL INTERPRETATION OF PHQ2 SCORE: 0

## 2018-11-30 NOTE — PROGRESS NOTES
Subjective:      Gurmeet Mercer is a 43 y.o. male who presents for consultation from the office of Dr. Burr, with a history of headaches and transient visual obscurations dating a couple of years back.    GILSON Levi is a pleasant 43-year-old right-handed gentleman whose headaches started several years ago, not associated with any visual distortions.  He describes a left hemispheric and occipital localization, and aching quality, without pulsatile features.  At times even the jaw ipsilaterally can be uncomfortable, notable if he tries to chew.  There are no other migrainous stigmata.  He describes head fullness at times, there is no allodynia, neck pain or stiffness or autonomic symptoms.  The headaches seem to start randomly over the course of the day, he was never able to identify triggers for them.  When they occurred they could last for hours at a time but will then resolve either spontaneously or with the help of Motrin 400 mg.  These would occur a couple of times every week, randomly over the course of the day.    Soon afterwards he began to notice visual changes, either transient blurriness and diplopia.  He has a history of a exotropia OD, but this was different.  The symptoms would come and go, never associated with the headaches themselves.  More clear visual distortions typically were a left homonymous photopsia and kaleidoscopic effect, arching from the superior into the inferior visual hemifield.  There was blurring of vision as well, the episodes can last for a couple of hours but again would resolve without sequelae.  About 50% of the time, these could be associated with some dull headache as described above, another occasions occurring in isolation.  Otherwise they were random in onset.    With all of the above, he did seek attention with an ophthalmologist who found no clear etiology for the symptoms, and thus recommended neurologic consultation.  MRI scan of the brain with and  "without contrast was performed on February 7, 2018, compared to a study from September, 2015, the latter done for other reasons, revealing old encephalomalacia of the right subfrontal hemisphere, nonspecific white matter changes, otherwise nothing of note.  There is no change between the 2 studies themselves.  He has not been treated otherwise.    Other than the above, there is no medical history of CVA, CAD, PVD, MS, seizure, migraine, malignancy, thyroid disease, autoimmune disease, psychiatric disease, pulmonary disease, liver or kidney disease, blood dyscrasia, YASMEEN, or dyslipidemia.  There is no surgical history of note.    His sister and mother both suffer from headaches though neither have been diagnosed as to type, his father is alive and well, there is no other family history of diagnosed neurologic disease.  He rarely drinks alcohol, it has no effect on the headaches and visual symptoms described above, he does not smoke cigarettes.  He works as an MRI technician.  He is on Restasis eyedrops for possible dry eye, also Motrin and Tylenol as needed for the headaches.    Review of Systems   Constitutional: Negative for malaise/fatigue.   Eyes: Positive for blurred vision and double vision. Negative for photophobia.   Gastrointestinal: Negative for nausea and vomiting.   Skin: Negative for rash.   Neurological: Positive for headaches. Negative for loss of consciousness.   Psychiatric/Behavioral: Negative for depression and memory loss.   All other systems reviewed and are negative.       Objective:     /68 (BP Location: Right arm, Patient Position: Sitting)   Pulse 81   Temp 36.6 °C (97.8 °F) (Temporal)   Resp 17   Ht 1.854 m (6' 1\")   Wt 102.2 kg (225 lb 3.2 oz)   SpO2 95%   BMI 29.71 kg/m²      Physical Exam    He appears in no acute distress.  His vital signs are stable.  There is no malar rash, jaw or temporal tenderness, the temporal arteries are nonpalpable and nontender bilaterally, there is " no jaw claudication.  The neck is supple, range of motion is full, carotid pulses are present bilaterally without asymmetry or bruits.  Cardiac evaluation reveals a regular rhythm.  There is no evidence of lower, no evidence of diffuse joint swelling and tenderness, rash, or bruising.    He is fully oriented, there is no evidence of focal cognitive or language deficits.    PERRLA/EOMI, visual fields are full to finger counting on confrontation bilaterally, funduscopic exam reveals sharp disc margins bilaterally, facial movements are symmetric, sensory exam is intact to temperature and light touch bilaterally, the tongue and uvula are midline without bulbar dysfunction, shoulder shrug and head rotation are intact.    Musculoskeletal exam reveals normal tone throughout, there is no tremor, asterixis, or drift.  Strength is 5/5 bilaterally, reflexes are brisk and present at all points without asymmetries in the upper extremities, they are clearly brisker in the left lower extremity when compared to the right, none are dropped, both toes are downgoing.    He walks with normal station, arm swing is symmetric, heel, toe, and tandem walking are intact.  There is no appendicular dystaxia, fine motor control and repetitive movements are intact with the hands, fingers and feet with normal and symmetric amplitude and frequencies.    Sensory exam is intact to temperature and vibration, Romberg is absent.     Assessment/Plan:     1. Nonintractable episodic headache, unspecified headache type  The headaches he describes are little unusual, they cannot easily be pigeon holed into a very specific benign headache type, though at times they have migrainous features.  He also has a family history of headaches suffers, but again, they too have not been characterized as to type.  Fortunately imaging is ruled out significant underlying structural pathologies, there is nothing to suggest a peripheral autoimmune process though I think  simple markers should be checked for thoroughness.  He is safe to use the Motrin for now, we may need to consider additional medications for symptomatic relief.    - WESTERGREN SED RATE; Future  - CRP HIGH SENSITIVE (CARDIAC); Future    2. Visual changes  Whether or not related to the headaches as a migrainous epiphenomena, again, imaging is ruled out structural pathology, ophthalmologic evaluations failed to reveal any clear cause as well, so for this, an EEG study should be done since occipital lobe seizures can present with symptoms such as this, and often are followed by headache.  He has no risk of note, though there is a right frontal encephalomalacia seen probably related to his old head trauma.  Still, geographically speaking, it is far removed from the occipital lobe where his seizures may be generated.  He will keep track of the episodes for now, we will follow-up after his EEG is done, we will call him earlier if there is an abnormality suggestive of seizure and need for treatment.    - REFERRAL TO NEURODIAGNOSTICS (EEG,EP,EMG/NCS/DBS) Modality Requested: EEG-Video  - WESTERGREN SED RATE; Future  - CRP HIGH SENSITIVE (CARDIAC); Future    Time: 60 minutes spent face-to-face for exam, review, discussion, and education, of this over 60% of the time spent counseling and coordinating care.

## 2019-02-25 ENCOUNTER — OFFICE VISIT (OUTPATIENT)
Dept: MEDICAL GROUP | Facility: MEDICAL CENTER | Age: 44
End: 2019-02-25
Payer: COMMERCIAL

## 2019-02-25 VITALS
TEMPERATURE: 98.1 F | WEIGHT: 229 LBS | SYSTOLIC BLOOD PRESSURE: 130 MMHG | BODY MASS INDEX: 30.35 KG/M2 | HEART RATE: 94 BPM | HEIGHT: 73 IN | DIASTOLIC BLOOD PRESSURE: 76 MMHG | OXYGEN SATURATION: 91 % | RESPIRATION RATE: 16 BRPM

## 2019-02-25 DIAGNOSIS — N63.0 BREAST NODULE: ICD-10-CM

## 2019-02-25 DIAGNOSIS — R22.31 AXILLARY MASS, RIGHT: ICD-10-CM

## 2019-02-25 PROCEDURE — 99214 OFFICE O/P EST MOD 30 MIN: CPT | Performed by: NURSE PRACTITIONER

## 2019-02-25 RX ORDER — CLINDAMYCIN HYDROCHLORIDE 300 MG/1
300 CAPSULE ORAL 3 TIMES DAILY
Qty: 30 CAP | Refills: 0 | Status: SHIPPED | OUTPATIENT
Start: 2019-02-25 | End: 2019-05-23

## 2019-02-25 ASSESSMENT — PATIENT HEALTH QUESTIONNAIRE - PHQ9: CLINICAL INTERPRETATION OF PHQ2 SCORE: 0

## 2019-02-26 ENCOUNTER — HOSPITAL ENCOUNTER (OUTPATIENT)
Dept: RADIOLOGY | Facility: MEDICAL CENTER | Age: 44
End: 2019-02-26
Attending: NURSE PRACTITIONER
Payer: COMMERCIAL

## 2019-02-26 DIAGNOSIS — R22.31 AXILLARY MASS, RIGHT: ICD-10-CM

## 2019-02-26 DIAGNOSIS — N63.0 BREAST NODULE: ICD-10-CM

## 2019-02-26 PROCEDURE — G0279 TOMOSYNTHESIS, MAMMO: HCPCS

## 2019-02-26 PROCEDURE — 76642 ULTRASOUND BREAST LIMITED: CPT | Mod: RT

## 2019-02-26 NOTE — PROGRESS NOTES
Subjective:     Gurmeet Mercer is a 43 y.o. male who presents with nodule on rt axilla.    HPI:   Seen in f/u for nodule under rt axilla.  Noted 1 week ago.  No dg.  No fever.  + headache.      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 30 Cap 0   • cyclosporin (RESTASIS) 0.05 % ophthalmic emulsion Place 1 Drop in both eyes 2 times a day.     • triamcinolone (NASACORT ALLERGY 24HR) 55 MCG/ACT nasal inhaler Spray 2 Sprays in nose every day.     • acetaminophen (TYLENOL) 500 MG Tab Take 500-1,000 mg by mouth every 6 hours as needed.     • Ibuprofen (ADVIL) 200 MG Cap Take  by mouth.       No current facility-administered medications for this visit.        Allergies   Allergen Reactions   • Sulfa Drugs        ROS  Constitutional: Negative. Negative for fever, chills, weight loss, malaise/fatigue and diaphoresis.   HENT: Negative. Negative for hearing loss, ear pain, nosebleeds, congestion, sore throat, neck pain, tinnitus and ear discharge.   Respiratory: Negative. Negative for cough, hemoptysis, sputum production, shortness of breath, wheezing and stridor.   Cardiovascular: Negative. Negative for chest pain, palpitations, orthopnea, claudication, leg swelling and PND.   Gastrointestinal: Denies nausea, vomiting, diarrhea, constipation, heartburn, melena or hematochezia.  Genitourinary: Denies dysuria, hematuria, urinary incontinence, frequency or urgency.        Objective:     Blood pressure 130/76, pulse 94, temperature 36.7 °C (98.1 °F), resp. rate 16, height 1.854 m  "(6' 1\"), weight 103.9 kg (229 lb), SpO2 91 %. Body mass index is 30.21 kg/m².    Physical Exam:  Vitals reviewed.  Constitutional: Oriented to person, place, and time. appears well-developed and well-nourished. No distress.   Cardiovascular: Normal rate, regular rhythm, normal heart sounds and intact distal pulses. Exam reveals no gallop and no friction rub. No murmur heard. No carotid bruits.   Pulmonary/Chest: Effort normal and breath sounds normal. No stridor. No respiratory distress. no wheezes or rales. exhibits no tenderness.   Musculoskeletal: Normal range of motion. Quarter sized movable nodule noted rt axilla w/o reddness or tenderness.    Lymphadenopathy: No cervical or supraclavicular adenopathy.   Neurological: Alert and oriented to person, place, and time. exhibits normal muscle tone.  Skin: Skin is warm and dry. No diaphoresis.   Psychiatric: Normal mood and affect. Behavior is normal.      Assessment and Plan:     The following treatment plan was discussed:    1. Axillary mass, right  clindamycin (CLEOCIN) 300 MG Cap    US-BREAST COMPLETE-RIGHT    etiology ? since not red and nontender.  do us of nodule.  f/u with pt with results.  if abscess he will take clindamycin.     2. Breast nodule  clindamycin (CLEOCIN) 300 MG Cap    US-BREAST COMPLETE-RIGHT    do us asap.  f/u with pt with results.  if folliculitis do warm soaks and take clindamycin.  f/u if not improved with tx         Followup: Return if symptoms worsen or fail to improve.  "

## 2019-04-15 ENCOUNTER — APPOINTMENT (OUTPATIENT)
Dept: NEUROLOGY | Facility: MEDICAL CENTER | Age: 44
End: 2019-04-15
Payer: COMMERCIAL

## 2019-04-29 ENCOUNTER — NON-PROVIDER VISIT (OUTPATIENT)
Dept: NEUROLOGY | Facility: MEDICAL CENTER | Age: 44
End: 2019-04-29
Payer: COMMERCIAL

## 2019-04-29 DIAGNOSIS — H53.9 VISUAL CHANGES: ICD-10-CM

## 2019-04-29 PROCEDURE — 95951 EEG: CPT | Mod: 52 | Performed by: PSYCHIATRY & NEUROLOGY

## 2019-04-29 NOTE — PROCEDURES
VIDEO ELECTROENCEPHALOGRAM REPORT      Referring provider:     DOS: April 29, 2019 of 30-minute duration.    INDICATION:  Gurmeet Mercer 43 y.o. male presenting with a history of transient visual obscurations, EEG requested to rule out underlying occipital lobe seizures.  There is no history of epilepsy, no previous tracings are available for comparison.    CURRENT ANTIEPILEPTIC REGIMEN: None    TECHNIQUE: 30 channel video electroencephalogram (EEG) was performed in accordance with the international 10-20 system. The study was reviewed in bipolar and referential montages. The recording examined the patient during wakeful and drowsy/sleep state(s).     DESCRIPTION OF THE RECORD:  During the wakefulness, the background showed a symmetrical 10 Hz alpha activity posteriorly with amplitude of 70 mV.  There was reactivity to eye closure/opening.  A normal anterior-posterior gradient was noted with faster beta frequencies seen anteriorly.  During drowsiness, theta/delta frequencies were seen.    ACTIVATION PROCEDURES:   Hyperventilation was performed by the patient for a total of 3 minutes. The technician performing the test noted good effort. This technique produced electroencephalographic changes in keeping with the expected bilaterally synchronous, frontally predominant, high amplitude slow waves build up.  Bifrontal muscle and movement artifact is seen during hyperventilation, not associated with any distortion of the background, but limiting interpretation to a degree.    Intermittent Photic stimulation was performed in a stepwise fashion from 1 to 30 Hz and elicited a normal response (photic driving), most noticeable in the posterior leads.    ICTAL AND/OR INTERICTAL FINDINGS:   No focal or generalized epileptiform activity noted. No regional slowing was seen during this routine study.  No clinical events or seizures were reported or recorded during the study.     EKG: sampling of the EKG  recording demonstrated sinus rhythm.     EVENTS: None    INTERPRETATION:  This is a normal video EEG recording in the awake and briefly drowsy state(s).  Clinical correlation is recommended.  A cause for the patient's symptoms could not be determined from this tracing.    Note: A normal EEG does not rule out epilepsy.  If the clinical suspicion remains high for seizures, a prolonged recording to capture clinical or subclinical events may be helpful.        Fredy Li M.D.

## 2019-05-07 ENCOUNTER — OFFICE VISIT (OUTPATIENT)
Dept: NEUROLOGY | Facility: MEDICAL CENTER | Age: 44
End: 2019-05-07
Payer: COMMERCIAL

## 2019-05-07 VITALS
TEMPERATURE: 98.1 F | OXYGEN SATURATION: 94 % | SYSTOLIC BLOOD PRESSURE: 110 MMHG | WEIGHT: 224.8 LBS | HEIGHT: 73 IN | DIASTOLIC BLOOD PRESSURE: 72 MMHG | HEART RATE: 98 BPM | BODY MASS INDEX: 29.79 KG/M2

## 2019-05-07 DIAGNOSIS — H53.9 VISUAL CHANGES: ICD-10-CM

## 2019-05-07 DIAGNOSIS — R51.9 NONINTRACTABLE EPISODIC HEADACHE, UNSPECIFIED HEADACHE TYPE: Primary | ICD-10-CM

## 2019-05-07 PROCEDURE — 99213 OFFICE O/P EST LOW 20 MIN: CPT | Performed by: PSYCHIATRY & NEUROLOGY

## 2019-05-07 RX ORDER — NAPROXEN SODIUM 550 MG/1
TABLET ORAL
Qty: 45 TAB | Refills: 4 | Status: SHIPPED | OUTPATIENT
Start: 2019-05-07 | End: 2019-05-23

## 2019-05-07 ASSESSMENT — ENCOUNTER SYMPTOMS
HEADACHES: 1
MEMORY LOSS: 0

## 2019-05-07 NOTE — PROGRESS NOTES
"Subjective:      Gurmeet Mercer is a 43 y.o. male who presents for follow-up, with a history of atypical episodic headache and nonspecific, transient visual distortions.     GILSON Villalba states that he has not had any recurrences of his visual distortions since several months prior to his initial evaluation last year.  His work-up so far has been unremarkable, MRI of the brain having revealed no significant underlying structural pathology, he just did undergo an EEG study in this office which was completely normal.  Unfortunately, he did not have any event during the time.    The headaches still continue though they have not worsened.  He has a headache that does more than get his attention once or twice a month.  The other headaches are daily, most of these do not require treatment.  Still occipital or frontal, they are constant, never pulsatile, they have some effect on concentration only, there are no other migrainous stigmata.    Medical, surgical and family histories are reviewed in the electronic health record, there are no new drug allergies.  He is on Advil as needed, Restasis ophthalmic, the rest as per the electronic health record, noncontributory from my standpoint.    Review of Systems   Neurological: Positive for headaches.   Psychiatric/Behavioral: Negative for memory loss.   All other systems reviewed and are negative.       Objective:     /72   Pulse 98   Temp 36.7 °C (98.1 °F) (Temporal)   Ht 1.854 m (6' 1\")   Wt 102 kg (224 lb 12.8 oz)   SpO2 94%   BMI 29.66 kg/m²      Physical Exam    He appears in no acute distress.  Vital signs are stable.  There is no malar rash or jaw claudication.  The neck is supple, range of motion is full.  Cardiac evaluation reveals a regular rhythm.    In quick and cursory fashion with observation, mental status, cranial nerve, musculoskeletal and coordination evaluations remain intact.     Assessment/Plan:     1. Nonintractable episodic " headache, unspecified headache type  For now we will treat symptomatically, and as needed only, he is not having severe headaches often enough that would warrant maintenance therapy.  He is comfortable with this.  The headaches do not fit criteria for migraine, so we need to use nonspecific analgesics.  Anaprox  mg will be tried, 1-2 tablets at headache onset, a single dose can be repeated up to a maximum of 3 tablets/day.  Side effects were reviewed.  We will follow-up in 1 year.    - naproxen sodium (ANAPROX) 550 MG tablet; 1-2 tab at headache onset, repeat in 1 hour prn to maximum of #3 tab/24 hours  Dispense: 45 Tab; Refill: 4    2. Visual changes  Again, a clear etiology has yet to be found, though from a neurologic standpoint, I am pretty confident that nothing that is progressive or of greater concern is at cause.  This still could be a migrainous epiphenomena and nothing more.  Since the episodes are so infrequent, I do not think putting him on medication is worthwhile.  He is certainly in agreement with this.  If these are seizures, and these cannot be ruled out simply on the basis of a normal, isolated EEG study, he will probably need an ambulatory study being done for better sensitivity given a recording over a longer interval of time.  For now he can play phone tag in this regard.    Time: 20 minutes spent face-to-face for exam, review, discussion, and education, of this over 50% of the time spent counseling and coordinating care surrounding all of the above issues.

## 2019-05-23 ENCOUNTER — OFFICE VISIT (OUTPATIENT)
Dept: URGENT CARE | Facility: MEDICAL CENTER | Age: 44
End: 2019-05-23
Payer: COMMERCIAL

## 2019-05-23 ENCOUNTER — HOSPITAL ENCOUNTER (OUTPATIENT)
Dept: RADIOLOGY | Facility: MEDICAL CENTER | Age: 44
End: 2019-05-23
Attending: PHYSICIAN ASSISTANT
Payer: COMMERCIAL

## 2019-05-23 VITALS
TEMPERATURE: 97.3 F | BODY MASS INDEX: 29.63 KG/M2 | DIASTOLIC BLOOD PRESSURE: 82 MMHG | SYSTOLIC BLOOD PRESSURE: 116 MMHG | HEART RATE: 73 BPM | OXYGEN SATURATION: 95 % | WEIGHT: 223.6 LBS | HEIGHT: 73 IN

## 2019-05-23 DIAGNOSIS — R10.12 LUQ ABDOMINAL PAIN: Primary | ICD-10-CM

## 2019-05-23 DIAGNOSIS — R10.12 LUQ ABDOMINAL PAIN: ICD-10-CM

## 2019-05-23 DIAGNOSIS — R19.5 INCREASED MUCUS IN STOOL: ICD-10-CM

## 2019-05-23 DIAGNOSIS — K63.89 CECUM MASS: ICD-10-CM

## 2019-05-23 PROCEDURE — 74177 CT ABD & PELVIS W/CONTRAST: CPT

## 2019-05-23 PROCEDURE — 99214 OFFICE O/P EST MOD 30 MIN: CPT | Performed by: PHYSICIAN ASSISTANT

## 2019-05-23 PROCEDURE — 700117 HCHG RX CONTRAST REV CODE 255: Performed by: PHYSICIAN ASSISTANT

## 2019-05-23 RX ORDER — CIPROFLOXACIN 500 MG/1
500 TABLET, FILM COATED ORAL 2 TIMES DAILY
Qty: 14 TAB | Refills: 0 | Status: SHIPPED | OUTPATIENT
Start: 2019-05-23 | End: 2019-05-30

## 2019-05-23 RX ORDER — PROMETHAZINE HYDROCHLORIDE 25 MG/1
25 TABLET ORAL EVERY 6 HOURS PRN
Qty: 30 TAB | Refills: 0 | Status: SHIPPED | OUTPATIENT
Start: 2019-05-23 | End: 2020-08-29

## 2019-05-23 RX ADMIN — IOHEXOL 25 ML: 240 INJECTION, SOLUTION INTRATHECAL; INTRAVASCULAR; INTRAVENOUS; ORAL at 16:15

## 2019-05-23 RX ADMIN — IOHEXOL 100 ML: 350 INJECTION, SOLUTION INTRAVENOUS at 16:14

## 2019-05-23 NOTE — PROGRESS NOTES
"Subjective:      Pt is a 43 y.o. male who presents with LUQ Pain (nausea, queezy, chills X10 days)            HPI  This is a new problem. Pt notes 10 days of LUQ abd pain after \"eating healthier\" with salads and nuts and notes chills, mucus in stools, diarrhea and nausea. Pt has not taken any Rx medications for this condition. Pt states the pain is a 5/10 for LUQ abd pain, aching in nature and worse during the day. Pt notes abd pain is resolving after drinking large amounts of hot water recently.  Pt denies CP, SOB,  paresthesias, headaches, dizziness, change in vision, hives, or other joint pain. The pt's medication list, problem list, and allergies have been evaluated and reviewed during today's visit.    PMH:  Past Medical History:   Diagnosis Date   • Plantar fasciitis 6/8/2009   • Tension headache 6/5/2009       PSH:  Negative per pt.      Fam Hx:    family history includes Heart Disease in his father; Hyperlipidemia in his mother; Hypertension in his mother.  Family Status   Relation Status   • Mo Alive   • Fa Alive   • Sis Alive       Soc HX:  Social History     Social History   • Marital status:      Spouse name: N/A   • Number of children: N/A   • Years of education: N/A     Occupational History   • Not on file.     Social History Main Topics   • Smoking status: Former Smoker   • Smokeless tobacco: Never Used   • Alcohol use 0.0 oz/week   • Drug use: No   • Sexual activity: Not on file     Other Topics Concern   • Not on file     Social History Narrative   • No narrative on file         Medications:    Current Outpatient Prescriptions:   •  cyclosporin (RESTASIS) 0.05 % ophthalmic emulsion, Place 1 Drop in both eyes 2 times a day., Disp: , Rfl:   •  triamcinolone (NASACORT ALLERGY 24HR) 55 MCG/ACT nasal inhaler, Spray 2 Sprays in nose every day., Disp: , Rfl:   •  acetaminophen (TYLENOL) 500 MG Tab, Take 500-1,000 mg by mouth every 6 hours as needed., Disp: , Rfl:   •  Ibuprofen (ADVIL) 200 MG Cap, " "Take  by mouth., Disp: , Rfl:       Allergies:  Sulfa drugs    ROS  Constitutional: Negative for fever, chills and malaise/fatigue.   HENT: Negative for congestion and sore throat.    Eyes: Negative for blurred vision, double vision and photophobia.   Respiratory: Negative for cough and shortness of breath.  Cardiovascular: Negative for chest pain and palpitations.   Gastrointestinal: POS nausea, LUQ abdominal pain, diarrhea and NEG vomiting and constipation.   Genitourinary: Negative for dysuria and flank pain.   Musculoskeletal: Negative for joint pain and myalgias.   Skin: Negative for itching and rash.   Neurological: Negative for dizziness, tingling and headaches.   Endo/Heme/Allergies: Does not bruise/bleed easily.   Psychiatric/Behavioral: Negative for depression. The patient is not nervous/anxious.           Objective:     /82   Pulse 73   Temp 36.3 °C (97.3 °F)   Ht 1.854 m (6' 1\")   Wt 101.4 kg (223 lb 9.6 oz)   SpO2 95%   BMI 29.50 kg/m²      Physical Exam   Abdominal: Soft. Normal appearance. He exhibits no shifting dullness, no distension, no pulsatile liver, no fluid wave, no abdominal bruit, no ascites, no pulsatile midline mass and no mass. Bowel sounds are increased. There is no hepatosplenomegaly. There is tenderness in the epigastric area and left upper quadrant. There is no rigidity, no rebound, no guarding, no CVA tenderness, no tenderness at McBurney's point and negative Persaud's sign. No hernia.             Constitutional: PT is oriented to person, place, and time. PT appears well-developed and well-nourished. No distress.   HENT:   Head: Normocephalic and atraumatic.   Mouth/Throat: Oropharynx is clear and moist. No oropharyngeal exudate.   Eyes: Conjunctivae normal and EOM are normal. Pupils are equal, round, and reactive to light.   Neck: Normal range of motion. Neck supple. No thyromegaly present.   Cardiovascular: Normal rate, regular rhythm, normal heart sounds and intact " distal pulses.  Exam reveals no gallop and no friction rub.    No murmur heard.  Pulmonary/Chest: Effort normal and breath sounds normal. No respiratory distress. PT has no wheezes. PT has no rales. Pt exhibits no tenderness.   Musculoskeletal: Normal range of motion. PT exhibits no edema and no tenderness.   Neurological: PT is alert and oriented to person, place, and time. PT has normal reflexes. No cranial nerve deficit.   Skin: Skin is warm and dry. No rash noted. PT is not diaphoretic. No erythema.       Psychiatric: PT has a normal mood and affect. PT behavior is normal. Judgment and thought content normal.     CT:  Narrative       5/23/2019 3:56 PM    HISTORY/REASON FOR EXAM:  LUQ abd pain.  Nausea, diarrhea.    TECHNIQUE/EXAM DESCRIPTION:   CT scan of the abdomen and pelvis with contrast.    Contrast-enhanced helical scanning was obtained from the diaphragmatic domes through the pubic symphysis following the bolus administration of nonionic contrast without complication.    100 mL of Omnipaque 350 nonionic contrast was administered without complication.    Low dose optimization technique was utilized for this CT exam including automated exposure control and adjustment of the mA and/or kV according to patient size.    COMPARISON: 7/2/2010    FINDINGS:  CT Abdomen:  Visualized lung bases show mild dependent atelectasis.  The liver is unremarkable.  The spleen is unremarkable.  Adrenal glands are unremarkable.  The kidneys are unremarkable.  The pancreas is unremarkable.  The gallbladder is unremarkable.    CT Pelvis:  Bladder is unremarkable.  Appendix has a normal appearance.  No peritoneal fluid or pneumoperitoneum.  Abdominal aorta is unremarkable.  Apparent mass with central low density in the cecum measuring 3.8 x 4.7 x 2.8 cm.  No peritoneal fluid or pneumoperitoneum.  Abdominal aorta is unremarkable.  No major bony abnormality is seen.   Impression       1.  Artifact versus low-density mass in the cecum  measuring 4.7 cm.  2.  Normal appendix.  3.  No evidence of bowel obstruction.   Reading Provider Reading Date   Estevan Arguello M.D. May 23, 2019   Signing Provider Signing Date Signing Time   Estevan Arguello M.D. May 23, 2019  4:36 PM          Assessment/Plan:     1. LUQ abdominal pain    - CT-ABDOMEN-PELVIS WITH; Future    2. Increased mucus in stool    3. Cecum mass    - Referral to GI    Cipro  Phenergan  BLAND diet encouraged  Rest, fluids encouraged.  AVS with medical info given.  Pt was in full understanding and agreement with the plan.  Differential diagnosis, natural history, supportive care, and indications for immediate follow-up discussed. All questions answered. Patient agrees with the plan of care.  Follow-up as needed if symptoms worsen or fail to improve.

## 2019-05-23 NOTE — PATIENT INSTRUCTIONS
Abdominal Pain, Adult  Many things can cause belly (abdominal) pain. Most times, belly pain is not dangerous. Many cases of belly pain can be watched and treated at home. Sometimes belly pain is serious, though. Your doctor will try to find the cause of your belly pain.  Follow these instructions at home:  · Take over-the-counter and prescription medicines only as told by your doctor. Do not take medicines that help you poop (laxatives) unless told to by your doctor.  · Drink enough fluid to keep your pee (urine) clear or pale yellow.  · Watch your belly pain for any changes.  · Keep all follow-up visits as told by your doctor. This is important.  Contact a doctor if:  · Your belly pain changes or gets worse.  · You are not hungry, or you lose weight without trying.  · You are having trouble pooping (constipated) or have watery poop (diarrhea) for more than 2-3 days.  · You have pain when you pee or poop.  · Your belly pain wakes you up at night.  · Your pain gets worse with meals, after eating, or with certain foods.  · You are throwing up and cannot keep anything down.  · You have a fever.  Get help right away if:  · Your pain does not go away as soon as your doctor says it should.  · You cannot stop throwing up.  · Your pain is only in areas of your belly, such as the right side or the left lower part of the belly.  · You have bloody or black poop, or poop that looks like tar.  · You have very bad pain, cramping, or bloating in your belly.  · You have signs of not having enough fluid or water in your body (dehydration), such as:  ¨ Dark pee, very little pee, or no pee.  ¨ Cracked lips.  ¨ Dry mouth.  ¨ Sunken eyes.  ¨ Sleepiness.  ¨ Weakness.  This information is not intended to replace advice given to you by your health care provider. Make sure you discuss any questions you have with your health care provider.  Document Released: 06/05/2009 Document Revised: 07/07/2017 Document Reviewed: 05/31/2017  [employer]  Interactive Patient Education © 2017 [employer]

## 2019-08-12 ENCOUNTER — TELEPHONE (OUTPATIENT)
Dept: MEDICAL GROUP | Facility: MEDICAL CENTER | Age: 44
End: 2019-08-12

## 2019-08-12 ENCOUNTER — HOSPITAL ENCOUNTER (OUTPATIENT)
Dept: LAB | Facility: MEDICAL CENTER | Age: 44
End: 2019-08-12
Attending: INTERNAL MEDICINE
Payer: COMMERCIAL

## 2019-08-12 ENCOUNTER — HOSPITAL ENCOUNTER (OUTPATIENT)
Dept: LAB | Facility: MEDICAL CENTER | Age: 44
End: 2019-08-12
Payer: COMMERCIAL

## 2019-08-12 ENCOUNTER — OFFICE VISIT (OUTPATIENT)
Dept: MEDICAL GROUP | Facility: MEDICAL CENTER | Age: 44
End: 2019-08-12
Payer: COMMERCIAL

## 2019-08-12 VITALS
SYSTOLIC BLOOD PRESSURE: 114 MMHG | HEIGHT: 73 IN | HEART RATE: 78 BPM | WEIGHT: 217 LBS | OXYGEN SATURATION: 92 % | TEMPERATURE: 97.1 F | DIASTOLIC BLOOD PRESSURE: 78 MMHG | BODY MASS INDEX: 28.76 KG/M2

## 2019-08-12 DIAGNOSIS — Z00.00 PREVENTATIVE HEALTH CARE: ICD-10-CM

## 2019-08-12 LAB
25(OH)D3 SERPL-MCNC: 29 NG/ML (ref 30–100)
ALBUMIN SERPL BCP-MCNC: 4.3 G/DL (ref 3.2–4.9)
ALBUMIN/GLOB SERPL: 1.3 G/DL
ALP SERPL-CCNC: 70 U/L (ref 30–99)
ALT SERPL-CCNC: 16 U/L (ref 2–50)
ANION GAP SERPL CALC-SCNC: 4 MMOL/L (ref 0–11.9)
AST SERPL-CCNC: 18 U/L (ref 12–45)
BASOPHILS # BLD AUTO: 1.5 % (ref 0–1.8)
BASOPHILS # BLD: 0.09 K/UL (ref 0–0.12)
BDY FAT % MEASURED: 25.5 %
BILIRUB SERPL-MCNC: 1 MG/DL (ref 0.1–1.5)
BP DIAS: 88 MMHG
BP SYS: 121 MMHG
BUN SERPL-MCNC: 18 MG/DL (ref 8–22)
CALCIUM SERPL-MCNC: 9.8 MG/DL (ref 8.5–10.5)
CHLORIDE SERPL-SCNC: 106 MMOL/L (ref 96–112)
CHOLEST SERPL-MCNC: 215 MG/DL (ref 100–199)
CO2 SERPL-SCNC: 25 MMOL/L (ref 20–33)
CREAT SERPL-MCNC: 1.15 MG/DL (ref 0.5–1.4)
DIABETES HTDIA: NO
EOSINOPHIL # BLD AUTO: 0.25 K/UL (ref 0–0.51)
EOSINOPHIL NFR BLD: 4.1 % (ref 0–6.9)
ERYTHROCYTE [DISTWIDTH] IN BLOOD BY AUTOMATED COUNT: 40.7 FL (ref 35.9–50)
EST. AVERAGE GLUCOSE BLD GHB EST-MCNC: 108 MG/DL
EVENT NAME HTEVT: NORMAL
FASTING HTFAS: NO
FASTING STATUS PATIENT QL REPORTED: NORMAL
GLOBULIN SER CALC-MCNC: 3.4 G/DL (ref 1.9–3.5)
GLUCOSE SERPL-MCNC: 90 MG/DL (ref 65–99)
GLUCOSE SERPL-MCNC: 93 MG/DL (ref 65–99)
HBA1C MFR BLD: 5.4 % (ref 0–5.6)
HCT VFR BLD AUTO: 46.5 % (ref 42–52)
HDLC SERPL-MCNC: 42 MG/DL
HGB BLD-MCNC: 15.5 G/DL (ref 14–18)
HYPERTENSION HTHYP: NO
IMM GRANULOCYTES # BLD AUTO: 0.01 K/UL (ref 0–0.11)
IMM GRANULOCYTES NFR BLD AUTO: 0.2 % (ref 0–0.9)
LDLC SERPL CALC-MCNC: 140 MG/DL
LYMPHOCYTES # BLD AUTO: 2.43 K/UL (ref 1–4.8)
LYMPHOCYTES NFR BLD: 39.6 % (ref 22–41)
MCH RBC QN AUTO: 29.8 PG (ref 27–33)
MCHC RBC AUTO-ENTMCNC: 33.3 G/DL (ref 33.7–35.3)
MCV RBC AUTO: 89.3 FL (ref 81.4–97.8)
MONOCYTES # BLD AUTO: 0.43 K/UL (ref 0–0.85)
MONOCYTES NFR BLD AUTO: 7 % (ref 0–13.4)
NEUTROPHILS # BLD AUTO: 2.93 K/UL (ref 1.82–7.42)
NEUTROPHILS NFR BLD: 47.6 % (ref 44–72)
NRBC # BLD AUTO: 0 K/UL
NRBC BLD-RTO: 0 /100 WBC
PLATELET # BLD AUTO: 230 K/UL (ref 164–446)
PMV BLD AUTO: 9.8 FL (ref 9–12.9)
POTASSIUM SERPL-SCNC: 4 MMOL/L (ref 3.6–5.5)
PROT SERPL-MCNC: 7.7 G/DL (ref 6–8.2)
RBC # BLD AUTO: 5.21 M/UL (ref 4.7–6.1)
SCREENING LOC CITY HTCIT: NORMAL
SCREENING LOC STATE HTSTA: NORMAL
SCREENING LOCATION HTLOC: NORMAL
SMOKING HTSMO: NO
SODIUM SERPL-SCNC: 135 MMOL/L (ref 135–145)
SUBSCRIBER ID HTSID: NORMAL
TRIGL SERPL-MCNC: 167 MG/DL (ref 0–149)
TSH SERPL DL<=0.005 MIU/L-ACNC: 1.46 UIU/ML (ref 0.38–5.33)
VIT B12 SERPL-MCNC: 604 PG/ML (ref 211–911)
WBC # BLD AUTO: 6.1 K/UL (ref 4.8–10.8)

## 2019-08-12 PROCEDURE — 80061 LIPID PANEL: CPT

## 2019-08-12 PROCEDURE — 82607 VITAMIN B-12: CPT

## 2019-08-12 PROCEDURE — 85025 COMPLETE CBC W/AUTO DIFF WBC: CPT

## 2019-08-12 PROCEDURE — 99213 OFFICE O/P EST LOW 20 MIN: CPT | Performed by: INTERNAL MEDICINE

## 2019-08-12 PROCEDURE — 80053 COMPREHEN METABOLIC PANEL: CPT

## 2019-08-12 PROCEDURE — S5190 WELLNESS ASSESSMENT BY NONPH: HCPCS

## 2019-08-12 PROCEDURE — 83036 HEMOGLOBIN GLYCOSYLATED A1C: CPT

## 2019-08-12 PROCEDURE — 82947 ASSAY GLUCOSE BLOOD QUANT: CPT

## 2019-08-12 PROCEDURE — 84443 ASSAY THYROID STIM HORMONE: CPT

## 2019-08-12 PROCEDURE — 82306 VITAMIN D 25 HYDROXY: CPT

## 2019-08-12 PROCEDURE — 36415 COLL VENOUS BLD VENIPUNCTURE: CPT

## 2019-08-12 NOTE — PROGRESS NOTES
Subjective:      Gurmeet Mercer is a 43 y.o. male who presents with gerd        HPI has started to have more gerd symptoms for the last few weeks will be more noticeable at night when laying down, has had some burning sensation in epigastric region, no abdominal pain, no nausea or emesis, no regurgitation, no change in bowel habits, no diarrhea or constipation, no blood in stools, no dysphagia or odynophagia, no weight loss, will have last meal at night 11 or midnight snack, eats dinner at 830 or 900. No caffeine late at night. Had been taking naprosyn occasionally but not daily 2-3 per week for headache. The gerd had been happening nightly. Started taking prilosec in am and then changed to bid but not taking prior to meals. The GI symptoms have improved and did not take Prilosec yesterday, no symptoms today.  No history of peptic ulcer disease.  No radiation of the discomfort, no history of gallbladder disease or pancreatitis              Current Outpatient Medications   Medication Sig Dispense Refill   • promethazine (PHENERGAN) 25 MG Tab Take 1 Tab by mouth every 6 hours as needed for Nausea/Vomiting. 30 Tab 0   • cyclosporin (RESTASIS) 0.05 % ophthalmic emulsion Place 1 Drop in both eyes 2 times a day.     • triamcinolone (NASACORT ALLERGY 24HR) 55 MCG/ACT nasal inhaler Spray 2 Sprays in nose every day.     • acetaminophen (TYLENOL) 500 MG Tab Take 500-1,000 mg by mouth every 6 hours as needed.     • Ibuprofen (ADVIL) 200 MG Cap Take  by mouth.       No current facility-administered medications for this visit.      Allergic rhinitis  8/1/17 start atrovent nasal, has tried astelin and nasonex previously      Dyslipidemia  9/11 chol 201,trig 179,hdl 37,ldl 128 start fish oil 2 bid  8/12 chol 183,trig 165,hdl 36,ldl 114  9/14/13 chol 208,trig 308,hdl 37,ldl 109  9/4/14 chol 210,trig 329,hdl 37,ldl 107  6/14/16 chol 220,trig 315,hdl 40,ldl 117  9/19/17 chol 210,trig 240,hdl 42,ldl 120  9/18/18 chol  204,trig 369,hdl 40,ldl 90     Family history brain aneurysm  In father  6/10 MRI/MRA brain negative, report mentioned abn right frontal, I spoke to  on the phone who reviewed the scans, no sig abnormality    Non-intractable tension headache  8/1/17 tension headache, occasional blurry vision left eye, referral back to ophthalmology, trial of nortriptyline 10 mg question tension headache, atypical migraine  12/4/17 tried pamelor no benefit, trial of acupuncture, ophthalmology evaluation pending   3/15/18 dr.hale ophthalmology note, scintillating scotomata, likely complicated migraine recommend different of lactic agent, referral to neurology , trial of xiidra drops for dry eyes, severe amblyopia  11/30/18  neurology note headache imaging negative, no evidence of autoimmune disease although markers will be checked, associated vision changes, will schedule EEG  4/29/19 EEG video negative  5/7/19 neurology note daily headache, no criteria for migraine, trial of anaprox 550 mg as needed up to 3 times daily, visual changes no clear etiology, very infrequent, could be migrainous epiphenomena, do not believe medication is worthwhile, consider ambulatory EEG in the future     Preventative health  9/12 tdap  6/14/16 vit d 24 start 2000 units  7/5/16 apnea link negative  6/7/19 colonoscopy per [facility] normal mucosa 2 mm cecum polyp pathology normal mucosa     Refractive amblyopia  3/1/16  eye exam, also dry eyes     vision changes  4/29/19 EEG video negative  5/7/19 neurology note daily headache, no criteria for migraine, trial of anaprox 550 mg as needed up to 3 times daily, visual changes no clear etiology, very infrequent, could be migrainous epiphenomena, do not believe medication is worthwhile, consider ambulatory EEG in the future                Patient Active Problem List   Diagnosis   • Family history of brain aneurysm   • Preventative health care   • Dyslipidemia   • Erectile  dysfunction   • Refractive amblyopia   • Allergic rhinitis   • Nonintractable episodic headache   • Visual changes         ROS       Objective:          Physical Exam   Constitutional: He appears well-developed and well-nourished. No distress.   HENT:   Head: Normocephalic and atraumatic.   Mouth/Throat: Oropharynx is clear and moist.   Eyes: Conjunctivae are normal. Right eye exhibits no discharge. Left eye exhibits no discharge.   Neck: Neck supple. No JVD present. No thyromegaly present.   Pulmonary/Chest: Effort normal and breath sounds normal.   Abdominal: Soft. Bowel sounds are normal. He exhibits no distension. There is no tenderness. There is no rebound and no guarding.   Neurological: He is alert.   Skin: Skin is warm. He is not diaphoretic.   Psychiatric: He has a normal mood and affect. His behavior is normal.   Nursing note and vitals reviewed.              Assessment/Plan:     Assessment  #! Gerd improving symptoms, has been taking Prilosec but not before meals, no alarm signs or symptoms currently        Plan  #1 gerd precautions try not to eat 2 hours before lying down, try to avoid large portion sizes and fatty food, continue no alcohol    #2  Repeat labs    #3 can try Pepcid instead of Prilosec, if he does take Prilosec take 30 minutes before meals with water only do not take with medications, supplements, vitamins    #4 monitor symptoms, notify us if symptoms worsen, no need for GI evaluation currently    #5 continue to work on nutrition, diet, exercise

## 2019-08-13 NOTE — TELEPHONE ENCOUNTER
Notified with results no need for medication, continue work with nutrition and exercise, take vitamin D 2000 units daily

## 2019-09-25 ENCOUNTER — IMMUNIZATION (OUTPATIENT)
Dept: OCCUPATIONAL MEDICINE | Facility: CLINIC | Age: 44
End: 2019-09-25

## 2019-09-25 DIAGNOSIS — Z23 NEED FOR VACCINATION: ICD-10-CM

## 2019-09-25 PROCEDURE — 90686 IIV4 VACC NO PRSV 0.5 ML IM: CPT | Performed by: PREVENTIVE MEDICINE

## 2019-10-28 ENCOUNTER — TELEPHONE (OUTPATIENT)
Dept: MEDICAL GROUP | Facility: MEDICAL CENTER | Age: 44
End: 2019-10-28

## 2019-10-28 DIAGNOSIS — Z71.84 TRAVEL ADVICE ENCOUNTER: ICD-10-CM

## 2019-10-28 RX ORDER — AZITHROMYCIN 500 MG/1
500 TABLET, FILM COATED ORAL DAILY
Qty: 3 TAB | Refills: 0 | Status: SHIPPED | OUTPATIENT
Start: 2019-10-28 | End: 2019-10-31

## 2019-10-28 NOTE — TELEPHONE ENCOUNTER
Travel to [facility] in January, needs referral to travel clinic and Zithromax for traveler's diarrhea as needed prescription to pharmacy, referral placed

## 2019-12-18 ENCOUNTER — NON-PROVIDER VISIT (OUTPATIENT)
Dept: OCCUPATIONAL MEDICINE | Facility: CLINIC | Age: 44
End: 2019-12-18

## 2019-12-18 VITALS — TEMPERATURE: 97.5 F | OXYGEN SATURATION: 96 % | HEART RATE: 66 BPM

## 2019-12-18 DIAGNOSIS — Z71.84 TRAVEL ADVICE ENCOUNTER: ICD-10-CM

## 2019-12-18 DIAGNOSIS — Z23 ENCOUNTER FOR IMMUNIZATION: ICD-10-CM

## 2019-12-18 PROCEDURE — 90691 TYPHOID VACCINE IM: CPT | Performed by: NURSE PRACTITIONER

## 2019-12-18 PROCEDURE — 90471 IMMUNIZATION ADMIN: CPT | Performed by: NURSE PRACTITIONER

## 2019-12-18 PROCEDURE — 90472 IMMUNIZATION ADMIN EACH ADD: CPT | Performed by: NURSE PRACTITIONER

## 2019-12-18 PROCEDURE — 90713 POLIOVIRUS IPV SC/IM: CPT | Performed by: NURSE PRACTITIONER

## 2019-12-18 PROCEDURE — 90632 HEPA VACCINE ADULT IM: CPT | Performed by: NURSE PRACTITIONER

## 2019-12-18 NOTE — PROGRESS NOTES
Travel dates: 01/18/20-2/10/20  Countries to be visited: [place], [place]   Reason for travel: leisure, pt will be traveling to [place], [place] ([place] and [place] [facility] [place]     Rural travel: maybe  High altitude travel: no    Accommodations:  Hotel: yes   Hostel:  Camping: yes  Cruise:  With family: yes  Other:    Vaccines in past 30 days? No  Sick today? No  Allergies: Sulfa drugs    The screening intake form was reviewed with the traveler. Health risks associated with their travel plans have been reviewed and discussed with the traveler. The traveler has been provided with vaccine information statements for the vaccines that are recommended and given the opportunity to discuss risks and benefits of vaccination and or medications. The traveler has received education on the travel itinerary provided and on the following topics.    Personal safety precautions: Yes  Food and water precautions: Yes  Management of traveler's diarrhea: Yes  Mosquito/insect bite prevention:Yes  Animal bites/Rabies prevention: yes  High altitude precautions: No      RN comments:     Typhoid, Hep A, and polio vaccine administered, vis given.    Travelers diarrhea self tx recommended. Risks and benefits reviewed. Pt has received Rx from PCP.   Malaria prophylaxis recommended. Patient aware of risks and benefits but declines medication at this time.  Pt is only in risk area for 4 days.         Physician consultation required: no

## 2020-01-13 ENCOUNTER — OFFICE VISIT (OUTPATIENT)
Dept: MEDICAL GROUP | Facility: MEDICAL CENTER | Age: 45
End: 2020-01-13
Payer: COMMERCIAL

## 2020-01-13 VITALS
HEIGHT: 78 IN | DIASTOLIC BLOOD PRESSURE: 74 MMHG | BODY MASS INDEX: 25.45 KG/M2 | SYSTOLIC BLOOD PRESSURE: 132 MMHG | WEIGHT: 220 LBS | HEART RATE: 81 BPM | OXYGEN SATURATION: 95 % | TEMPERATURE: 97.7 F

## 2020-01-13 DIAGNOSIS — Z71.84 TRAVEL ADVICE ENCOUNTER: ICD-10-CM

## 2020-01-13 DIAGNOSIS — R21 SKIN RASH: ICD-10-CM

## 2020-01-13 PROCEDURE — 99213 OFFICE O/P EST LOW 20 MIN: CPT | Performed by: INTERNAL MEDICINE

## 2020-01-13 RX ORDER — OSELTAMIVIR PHOSPHATE 75 MG/1
75 CAPSULE ORAL 2 TIMES DAILY
Qty: 20 CAP | Refills: 0 | Status: SHIPPED | OUTPATIENT
Start: 2020-01-13 | End: 2020-01-14 | Stop reason: SDUPTHER

## 2020-01-13 RX ORDER — CLOBETASOL PROPIONATE 0.5 MG/G
1 CREAM TOPICAL 2 TIMES DAILY PRN
Qty: 60 G | Refills: 3 | Status: SHIPPED | OUTPATIENT
Start: 2020-01-13 | End: 2020-08-29

## 2020-01-13 RX ORDER — AZITHROMYCIN 500 MG/1
500 TABLET, FILM COATED ORAL DAILY
Qty: 3 TAB | Refills: 0 | Status: SHIPPED | OUTPATIENT
Start: 2020-01-13 | End: 2020-08-29

## 2020-01-13 NOTE — PROGRESS NOTES
Subjective:      Gurmeet Mercer is a 44 y.o. male who presents with Other (Rash)            HPI   New complaint itching  Upcoming trip to [facility] and has had vaccines will be there for three weeks, and has been to the travel clinic.  Patient would like Tamiflu in case of flu exposure or sickness for travel, also would like medication for traveler's diarrhea.  Allergy to sulfa drugs. Has eczema on fingers and hands uses clobetasol 0.05% as needed with benefit. Has had itching on the back for two weeks, no rash, no pain but just itches, has tried triamcinolone over the area, no redness or drainage, does have new mattress at home which may be contributing.  No obvious rash over the area as his partner did take a look at that area with no obvious rash located there.  He does use moisturizers on his fingers and elbows for his eczema, typically eczema is worse during the winter months.  Medication, allergies reviewed and updated        Current Outpatient Medications   Medication Sig Dispense Refill   • promethazine (PHENERGAN) 25 MG Tab Take 1 Tab by mouth every 6 hours as needed for Nausea/Vomiting. 30 Tab 0   • cyclosporin (RESTASIS) 0.05 % ophthalmic emulsion Place 1 Drop in both eyes 2 times a day.     • triamcinolone (NASACORT ALLERGY 24HR) 55 MCG/ACT nasal inhaler Spray 2 Sprays in nose every day.     • acetaminophen (TYLENOL) 500 MG Tab Take 500-1,000 mg by mouth every 6 hours as needed.     • Ibuprofen (ADVIL) 200 MG Cap Take  by mouth.       No current facility-administered medications for this visit.      Patient Active Problem List   Diagnosis   • Family history of brain aneurysm   • Preventative health care   • Dyslipidemia   • Erectile dysfunction   • Refractive amblyopia   • Allergic rhinitis   • Nonintractable episodic headache   • Visual changes          Health Maintenance Summary                IMM DTaP/Tdap/Td Vaccine Next Due 9/21/2022      Done 9/21/2012 Imm Admin: Tdap Vaccine      "Patient has more history with this topic...          Patient Care Team:  Dheerajhector Burr M.D. as PCP - General    ROS       Objective:     /74 (BP Location: Right arm, Patient Position: Sitting, BP Cuff Size: Adult)   Pulse 81   Temp 36.5 °C (97.7 °F)   Ht 2.007 m (6' 7\")   Wt 99.8 kg (220 lb)   SpO2 95%   BMI 24.78 kg/m²      Physical Exam  Vitals signs and nursing note reviewed.   Constitutional:       Appearance: Normal appearance.   HENT:      Head: Normocephalic and atraumatic.   Cardiovascular:      Rate and Rhythm: Normal rate and regular rhythm.      Heart sounds: No murmur.   Pulmonary:      Effort: No respiratory distress.      Breath sounds: Normal breath sounds.   Abdominal:      General: There is no distension.   Skin:     General: Skin is warm.   Neurological:      Mental Status: He is alert.   Psychiatric:         Mood and Affect: Mood normal.       Hands no eczema, right elbow minimal eczema, mid to lower back some dry skin, no obvious eczema, no open sores, lesions, ulcerations, no shingles          Assessment/Plan:       Assessment  #!  Dry itchy skin mid back likely eczema, no evidence of cellulitis or shingles, has had eczema previously of his hands and elbow and has used clobetasol with benefit    #2  Upcoming travel to [facility], has had influenza vaccine, typhoid vaccine, polio vaccine,hep a vaccine at the travel clinic        Plan  #1 tamiflu for travel to the [facility], up-to-date on flu vaccine    #2 zithromax 500 mg x 3 days  should he develop symptoms of Travelers diarrhea while on trip    #3  Refill clobetasol can apply to fingers, elbow, or back    #4 use moisturizer over those areas, use sunscreen particularly while in [facility]              "

## 2020-01-14 RX ORDER — OSELTAMIVIR PHOSPHATE 75 MG/1
75 CAPSULE ORAL 2 TIMES DAILY
Qty: 10 CAP | Refills: 0 | Status: SHIPPED | OUTPATIENT
Start: 2020-01-14 | End: 2020-08-29

## 2020-05-05 ENCOUNTER — APPOINTMENT (OUTPATIENT)
Dept: NEUROLOGY | Facility: MEDICAL CENTER | Age: 45
End: 2020-05-05
Payer: COMMERCIAL

## 2020-08-11 ENCOUNTER — TELEMEDICINE (OUTPATIENT)
Dept: MEDICAL GROUP | Facility: MEDICAL CENTER | Age: 45
End: 2020-08-11
Payer: COMMERCIAL

## 2020-08-11 VITALS — BODY MASS INDEX: 29.82 KG/M2 | TEMPERATURE: 98.6 F | HEIGHT: 73 IN | WEIGHT: 225 LBS

## 2020-08-11 DIAGNOSIS — M25.531 RIGHT WRIST PAIN: ICD-10-CM

## 2020-08-11 PROCEDURE — 99213 OFFICE O/P EST LOW 20 MIN: CPT | Mod: 95,CR | Performed by: INTERNAL MEDICINE

## 2020-08-11 ASSESSMENT — FIBROSIS 4 INDEX: FIB4 SCORE: .8804347826086956522

## 2020-08-11 NOTE — PROGRESS NOTES
Telemedicine Visit: Established Patient     This evaluation was conducted via zoom, using secure and encrypted videoconferencing technology.  The patient was physical located at zoom in zoom and the physician was located in zoom. The patient was presented by zoom.  The patient's identity was confirmed and verbal consent for the telemedicine encounter was obtained.      Subjective:   CC: wrist pain  Gurmeet Mercer is a 44 y.o. male presenting for evaluation and management of: Wrist pain     Patient states that he injured his right wrist on july 23 when he was at work ([employer]) helping an overweight patient from a wheelchair although he did not immediately have any discomfort in his right wrist at the time of helping the patient however the next day he began to experience pain in the right wrist, no swelling in the hand or wrist area, no direct trauma to the area, no right shoulder injury or pain.  He has noticed over the past few weeks that working consecutive days will tend to make the right wrist more painful and he has been using a wrist brace and the discomfort has slowly been getting improving.  However although his symptoms have improved when he was off work not having to use his right wrist to help patients or carry or lift anything heavy, last week when went back to work, working multiple days ago, he experienced worsening symptoms at the end of the work week. Did try advil for the first week 800 mg and did have some gerd related to the Advil so he discontinued taking that medication.      Allergies   Allergen Reactions   • Sulfa Drugs        Current medicines (including changes today)  Current Outpatient Medications   Medication Sig Dispense Refill   • clobetasol (TEMOVATE) 0.05 % Cream Apply 1 Application to affected area(s) 2 times a day as needed (apply to affected area). 60 g 3   • triamcinolone (NASACORT ALLERGY 24HR) 55 MCG/ACT nasal inhaler Spray 2 Sprays in nose every day.     •  "acetaminophen (TYLENOL) 500 MG Tab Take 500-1,000 mg by mouth every 6 hours as needed.     • Ibuprofen (ADVIL) 200 MG Cap Take  by mouth.     • oseltamivir (TAMIFLU) 75 MG Cap Take 1 Cap by mouth 2 times a day. 10 Cap 0   • azithromycin (ZITHROMAX) 500 MG tablet Take 1 Tab by mouth every day. 3 Tab 0   • promethazine (PHENERGAN) 25 MG Tab Take 1 Tab by mouth every 6 hours as needed for Nausea/Vomiting. (Patient not taking: Reported on 8/11/2020) 30 Tab 0   • cyclosporin (RESTASIS) 0.05 % ophthalmic emulsion Place 1 Drop in both eyes 2 times a day.       No current facility-administered medications for this visit.        Patient Active Problem List    Diagnosis Date Noted   • Visual changes 05/07/2019   • Nonintractable episodic headache 11/30/2018   • Allergic rhinitis 08/01/2017   • Refractive amblyopia 04/13/2016   • Erectile dysfunction 12/28/2015   • Dyslipidemia 09/18/2011   • Preventative health care 09/16/2011   • Family history of brain aneurysm 05/27/2010       Family History   Problem Relation Age of Onset   • Hyperlipidemia Mother    • Hypertension Mother    • Heart Disease Father         a.fib       He  has a past medical history of Plantar fasciitis (6/8/2009) and Tension headache (6/5/2009).  He  has no past surgical history on file.         Health Maintenance Summary                IMM INFLUENZA Next Due 9/1/2020      Done 9/25/2019 Imm Admin: Influenza Vaccine Quad Inj (Pf)     Patient has more history with this topic...    IMM DTaP/Tdap/Td Vaccine Next Due 9/21/2022      Done 9/21/2012 Imm Admin: Tdap Vaccine     Patient has more history with this topic...          Patient Care Team:  Dheeraj Burr M.D. as PCP - General     Objective:   Temp 37 °C (98.6 °F)   Ht 1.854 m (6' 1\")   Wt 102.1 kg (225 lb)   BMI 29.69 kg/m²     Physical Exam:   Constitutional: Alert, no distress, well-groomed.  Skin: No rashes in visible areas.  Eye: Round. Conjunctiva clear, lids normal. No icterus.   ENMT: Lips " pink without lesions, good dentition, moist mucous membranes. Phonation normal.  Neck: No masses, no thyromegaly. Moves freely without pain.  Respiratory: Unlabored respiratory effort, no cough or audible wheeze  Psych: Alert and oriented x3, normal affect and mood.       Assessment and Plan:   The following treatment plan was discussed:   Assessment  #!  Right wrist likely strain related to assisting to patient at work, no direct trauma, no sensory changes, no swelling, symptoms have improved with rest but are exacerbated with returning back to work because of working with patients and carrying and lifting patients or objects at work, rest and time of work has improved the symptoms      Plan  #1 technically this is a Workmen's Compensation case and I would suggest he discuss with his supervisor whether or not he needs to be seen in occupational health and open a Workmen's Compensation claim for this    #2 if he would require intermittent medical leave or time off to recover from the injury or any type of imaging, physical therapy, medication, since this is work-related, it should be covered under Workmen's Compensation    #3 patient will check with his supervisor and inform me whether he would be pursuing Workmen's Compensation, should that be the case they will need to evaluate him as we do not handle Workmen's Compensation or occupational injury cases here    #4 if he would like me to complete any FMLA paperwork should he not pursue Workmen's Compensation, he will let me know      #5 in the interim, he can use over-the-counter Advil 400 milligrams twice a day as needed for pain and monitor for GI upset

## 2020-08-25 ENCOUNTER — OCCUPATIONAL MEDICINE (OUTPATIENT)
Dept: URGENT CARE | Facility: CLINIC | Age: 45
End: 2020-08-25
Payer: COMMERCIAL

## 2020-08-25 ENCOUNTER — APPOINTMENT (OUTPATIENT)
Dept: RADIOLOGY | Facility: IMAGING CENTER | Age: 45
End: 2020-08-25
Attending: PHYSICIAN ASSISTANT
Payer: COMMERCIAL

## 2020-08-25 VITALS
TEMPERATURE: 97.7 F | DIASTOLIC BLOOD PRESSURE: 62 MMHG | RESPIRATION RATE: 16 BRPM | HEIGHT: 73 IN | SYSTOLIC BLOOD PRESSURE: 108 MMHG | WEIGHT: 212 LBS | HEART RATE: 62 BPM | BODY MASS INDEX: 28.1 KG/M2 | OXYGEN SATURATION: 95 %

## 2020-08-25 DIAGNOSIS — M25.531 RIGHT WRIST PAIN: ICD-10-CM

## 2020-08-25 DIAGNOSIS — Z02.1 PRE-EMPLOYMENT DRUG SCREENING: ICD-10-CM

## 2020-08-25 DIAGNOSIS — S66.911A STRAIN OF RIGHT WRIST, INITIAL ENCOUNTER: ICD-10-CM

## 2020-08-25 LAB
AMP AMPHETAMINE: NORMAL
BAR BARBITURATES: NORMAL
BREATH ALCOHOL COMMENT: NORMAL
BZO BENZODIAZEPINES: NORMAL
COC COCAINE: NORMAL
INT CON NEG: NEGATIVE
INT CON POS: POSITIVE
MDMA ECSTASY: NORMAL
MET METHAMPHETAMINES: NORMAL
MTD METHADONE: NORMAL
OPI OPIATES: NORMAL
OXY OXYCODONE: NORMAL
PCP PHENCYCLIDINE: NORMAL
POC BREATHALIZER: 0 PERCENT (ref 0–0.01)
POC URINE DRUG SCREEN OCDRS: NEGATIVE
THC: NORMAL

## 2020-08-25 PROCEDURE — 80305 DRUG TEST PRSMV DIR OPT OBS: CPT | Mod: 29 | Performed by: PHYSICIAN ASSISTANT

## 2020-08-25 PROCEDURE — 73110 X-RAY EXAM OF WRIST: CPT | Mod: TC,FY,RT | Performed by: PHYSICIAN ASSISTANT

## 2020-08-25 PROCEDURE — 82075 ASSAY OF BREATH ETHANOL: CPT | Mod: 29 | Performed by: PHYSICIAN ASSISTANT

## 2020-08-25 PROCEDURE — 99214 OFFICE O/P EST MOD 30 MIN: CPT | Mod: 29 | Performed by: PHYSICIAN ASSISTANT

## 2020-08-25 ASSESSMENT — FIBROSIS 4 INDEX: FIB4 SCORE: .8804347826086956522

## 2020-08-25 NOTE — PROGRESS NOTES
"Subjective:      Gurmeet Mercer is a 44 y.o. male who presents with Wrist Injury (DOI: 07/17/20, right wrist, pain comes and goes, feels weak after the end of day, burning sensation to it )      DOI: 7/17/20.  Right wrist pain.  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  He did not feel any immediate injury however over the next several days his wrist pain started to increase.  It is mainly on the ulnar side and does cause some burning sensation radiating into the mid lateral forearm.  He has been wearing a brace and has been doing limited lifting at work.  He states on his days off it is fine however after a long week he does feel some pain and weakness.  Patient is right-hand dominant.  No previous injuries to right wrist or hand.  No second job.     Wrist Injury         ROS    Medications, Allergies, and current problem list reviewed today in Epic     Objective:     /62 (BP Location: Left arm, Patient Position: Sitting, BP Cuff Size: Adult)   Pulse 62   Temp 36.5 °C (97.7 °F) (Temporal)   Resp 16   Ht 1.854 m (6' 1\")   Wt 96.2 kg (212 lb)   SpO2 95%   BMI 27.97 kg/m²      Physical Exam  Vitals signs and nursing note reviewed.   Constitutional:       General: He is not in acute distress.     Appearance: He is well-developed. He is not diaphoretic.   Skin:     General: Skin is warm and dry.   Neurological:      Mental Status: He is alert and oriented to person, place, and time.   Psychiatric:         Behavior: Behavior normal.         Thought Content: Thought content normal.         Judgment: Judgment normal.         No significant soft tissue tenderness, deformity, swelling, bruising seen.  He does have mild pain on the lateral right wrist.  Pain with passive flexion otherwise range of motion normal.   strength equal.  Distal neurovascular intact.  No snuffbox tenderness.       Assessment/Plan:         1. Right wrist pain  DX-WRIST-COMPLETE 3+ RIGHT   2. " Strain of right wrist, initial encounter       X-ray negative.  Soft tissue strain which is continually aggravated at work.  He was given 2 wrist braces: One for home and one for work.  Due to COVID he does not want to bring his 30 hospital use brace home.  OTC meds and conservative measures as discussed  Follow-up 4 days  Work restrictions    Please note that this dictation was created using voice recognition software. I have made every reasonable attempt to correct obvious errors, but I expect that there are errors of grammar and possibly content that I did not discover before finalizing the note.

## 2020-08-25 NOTE — LETTER
"EMPLOYEE’S CLAIM FOR COMPENSATION/ REPORT OF INITIAL TREATMENT  FORM C-4    EMPLOYEE’S CLAIM - PROVIDE ALL INFORMATION REQUESTED   First Name  Gurmeet Last Name  Ruslan Birthdate                    1975                Sex  male Claim Number   Home Address  [address] [address] Age  44 y.o. 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 (if applicable)  PATIENT FALL    How did this injury or occupational disease occur? (Be specific an answer in detail. Use additional sheet if necessary)  I  was helping a patient to table and then started to fall & I held them up.   If you believe that you have an occupational disease, when did you first have knowledge of the disability and it relationship to your employment?  n/a Witnesses to the Accident  RADHA DIEGO ( CT TECHS)      Nature of Injury or Occupational Disease  Sprain  Part(s) of Body Injured or Affected  Wrist (R) and Hand (R), N/A, N/A    I certify that the above is true and correct to the best of my knowledge and that I have provided this information in order to obtain the benefits of [place]).  I hereby authorize any physician, chiropractor, surgeon, practitioner, or other person, any hospital, including [facility] or [facility], any medical service organization, any insurance company, or other institution or organization to release to each other, any medical or other information, including benefits paid or payable, pertinent to this injury or disease, except information relative to diagnosis, treatment and/or counseling for AIDS, psychological conditions, alcohol or controlled substances, for which I must give specific authorization.  A Photostat of this authorization shall be as valid as the original.     Date:8/25/2020   Place: [facility]   Employee’s Signature   THIS REPORT MUST BE COMPLETED AND MAILED WITHIN 3 WORKING DAYS OF TREATMENT   Place  [facility]  Name of Facility  [facility]   Date  8/25/2020 Diagnosis  (M25.531) Right wrist pain  (S66.911A) Strain of right wrist, initial encounter Is there evidence the injured employee was under the              influence of alcohol and/or another controlled substance at the time of accident?   Hour  8:49 AM Description of Injury or Disease  Diagnoses of Right wrist pain and Strain of right wrist, initial encounter were pertinent to this visit. No   Treatment  Soft tissue injury.  Wrist brace  Follow-up 4 days  OTC meds and conservative measures as discussed  Have you advised the patient to remain off work five days or     more? No   X-Ray Findings  Negative   If Yes   From Date  To Date      From information given by the employee, together with medical evidence, can you directly connect this injury or occupational disease as job incurred?  Yes If No Full Duty    No Modified Duty  Yes   Is additional medical care by a physician indicated?  Yes If Modified Duty, Specify any Limitations / Restrictions  No pushing or pulling with  "right hand  Weight limit 10 pounds right hand  Must wear brace at work   Do you know of any previous injury or disease contributing to this condition or occupational disease?                            No   Date  8/25/2020 Print Doctor’s Name   Kerrie Diaz P.A.-C. I certify the employer’s copy of  this form was mailed on:   Address  [address] Insurer’s Use Only     [place] [facility] Zip  [address]    Provider’s Tax ID Number  [id number] Telephone  Dept: [phone]      [employer]win-KERRIE Ardon P.A.-C.  Signature:     Degree          ORIGINAL-TREATING PHYSICIAN OR CHIROPRACTOR    PAGE 2-INSURER/TPA    PAGE 3-EMPLOYER    PAGE 4-EMPLOYEE        Form C-4 (rev.10/07)          BRIEF DESCRIPTION OF RIGHTS AND BENEFITS  (Pursuant to [place]RS 616C.050)    Notice of Injury or Occupational Disease (Incident Report Form C-1): If an injury or occupational disease (OD) arises out of and in the course of employment, you must provide written notice to your employer as soon as practicable, but no later than 7 days after the accident or OD. Your employer shall maintain a sufficient supply of the required forms.     Claim for Compensation (Form C-4): If medical treatment is sought, the form C-4 is available at the place of initial treatment. A completed \"Claim for Compensation\" (Form C-4) must be filed within 90 days after an accident or OD. The treating physician or chiropractor must, within 3 working days after treatment, complete and mail to the employer, the employer's insurer and third-party , the Claim for Compensation.     Medical Treatment: If you require medical treatment for your on-the-job injury or OD, you may be required to select a physician or chiropractor from a list provided by your workers’ compensation insurer, if it has contracted with an Organization for Managed Care (MCO) or Preferred Provider Organization (PPO) or providers of health care. If your employer has not entered " into a contract with an MCO or PPO, you may select a physician or chiropractor from the Panel of Physicians and Chiropractors. Any medical costs related to your industrial injury or OD will be paid by your insurer.     Temporary Total Disability (TTD): If your doctor has certified that you are unable to work for a period of at least 5 consecutive days, or 5 cumulative days in a 20-day period, or places restrictions on you that your employer does not accommodate, you may be entitled to TTD compensation.     Temporary Partial Disability (TPD): If the wage you receive upon reemployment is less than the compensation for TTD to which you are entitled, the insurer may be required to pay you TPD compensation to make up the difference. TPD can only be paid for a maximum of 24 months.     Permanent Partial Disability (PPD): When your medical condition is stable and there is an indication of a PPD as a result of your injury or OD, within 30 days, your insurer must arrange for an evaluation by a rating physician or chiropractor to determine the degree of your PPD. The amount of your PPD award depends on the date of injury, the results of the PPD evaluation and your age and wage.     Permanent Total Disability (PTD): If you are medically certified by a treating physician or chiropractor as permanently and totally disabled and have been granted a PTD status by your insurer, you are entitled to receive monthly benefits not to exceed 66 2/3% of your average monthly wage. The amount of your PTD payments is subject to reduction if you previously received a PPD award.     Vocational Rehabilitation Services: You may be eligible for vocational rehabilitation services if you are unable to return to the job due to a permanent physical impairment or permanent restrictions as a result of your injury or occupational disease.     Transportation and Per Raina Reimbursement: You may be eligible for travel expenses and per raina associated with  medical treatment.     Reopening: You may be able to reopen your claim if your condition worsens after claim closure.     Appeal Process: If you disagree with a written determination issued by the insurer or the insurer does not respond to your request, you may appeal to the Department of Administration, , by following the instructions contained in your determination letter. You must appeal the determination within 70 days from the date of the determination letter at [address], [address], [place], [place] [address], or [address], [address], [place], [place] [address]. If you disagree with the  decision, you may appeal to the Department of Administration, . You must file your appeal within 30 days from the date of the  decision letter at [address], [address], [place], [place] [address], or [address], [address], [place], [place] [address]. If you disagree with a decision of an , you may file a petition for judicial review with the District Court. You must do so within 30 days of the Appeal Officer’s decision. You may be represented by an  at your own expense or you may contact the [facility] for possible representation.     [place]  for Injured Workers (NAIW): If you disagree with a  decision, you may request that NAIW represent you without charge at an  Hearing. For information regarding denial of benefits, you may contact the [facility] at: [address], [address], [place] [address], [phone], or [address], [address], [place] [address], [phone]     To File a Complaint with the Division: If you wish to file a complaint with the  of the Division of Industrial Relations (DIR), please contact the Workers’ Compensation Section, [address], [address], [place], [place] [address], telephone ([phone], or [address], [address], [place], [place] [address], telephone [phone].     For assistance with Workers’ Compensation Issues: You may contact the Office of the Governor Consumer Health Assistance, [address] [place] [address], [address], [place], [place] [address], Toll Free 1-953.625.8187, Web site: [email or web address]., E-mail [email or web address].              __________________________________________________________________                              ___________________         Employee Name / Signature                                                                                                                     Date                                                                                                                                                                                       D-2 (rev. 01/20)

## 2020-08-25 NOTE — LETTER
[facility] [place]  [address] - [place] [address]  Phone:  [phone] - Fax:  [phone]   [facility] Progress Report and Disability Certification  Date of Service: 8/25/2020   No Show:  No  Date / Time of Next Visit: 8/29/2020   Claim Information   Patient Name: Gurmeet Mercer  Claim Number:     Employer: [employer]  Date of Injury: 7/17/2020     Insurer / TPA: Workers Choice  ID / SSN:     Occupation: MRI TECH  Diagnosis: Diagnoses of Right wrist pain and Strain of right wrist, initial encounter were pertinent to this visit.    Medical Information   Related to Industrial Injury? Yes    Subjective Complaints:  DOI: 7/17/20.  Right wrist pain.  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  He did not feel any immediate injury however over the next several days his wrist pain started to increase.  It is mainly on the ulnar side and does cause some burning sensation radiating into the mid lateral forearm.  He has been wearing a brace and has been doing limited lifting at work.  He states on his days off it is fine however after a long week he does feel some pain and weakness.  Patient is right-hand dominant.  No previous injuries to right wrist or hand.  No second job.   Objective Findings: No significant soft tissue tenderness, deformity, swelling, bruising seen.  He does have mild pain on the lateral right wrist.  Pain with passive flexion otherwise range of motion normal.   strength equal.  Distal neurovascular intact.  No snuffbox tenderness.   Pre-Existing Condition(s): None   Assessment:   Initial Visit    Status: Additional Care Required  Permanent Disability:No    Plan: Medication    Diagnostics: X-ray    Comments:       Disability Information   Status: Released to Restricted Duty    From:  8/25/2020  Through: 8/29/2020 Restrictions are: Temporary   Physical Restrictions   Sitting:    Standing:   Stooping:    Bending:      Squatting:    Walking:    Climbing:    Pushin hrs/day   Pullin hrs/day Other:    Reaching Above Shoulder (L):   Reaching Above Shoulder (R):       Reaching Below Shoulder (L):    Reaching Below Shoulder (R):      Not to exceed Weight Limits   Carrying(hrs):   Weight Limit(lb): < or = to 10 pounds Lifting(hrs):   Weight  Limit(lb): < or = to 10 pounds   Comments: Must wear brace at work.  All restrictions for right hand only.    Repetitive Actions   Hands: i.e. Fine Manipulations from Grasping:     Feet: i.e. Operating Foot Controls:     Driving / Operate Machinery:     Provider Name:   González Diaz P.A.-C. Physician Signature:  Physician Name:     Clinic Name / Location: [address]  [place] [address] Clinic Phone Number: Dept: [phone]   Appointment Time: 8:15 Am Visit Start Time: 8:49 AM   Check-In Time:  8:45 Am Visit Discharge Time:  9:47AM   Original-Treating Physician or Chiropractor    Page 2-Insurer/TPA    Page 3-Employer    Page 4-Employee

## 2020-08-29 ENCOUNTER — OCCUPATIONAL MEDICINE (OUTPATIENT)
Dept: URGENT CARE | Facility: CLINIC | Age: 45
End: 2020-08-29
Payer: COMMERCIAL

## 2020-08-29 VITALS
DIASTOLIC BLOOD PRESSURE: 78 MMHG | SYSTOLIC BLOOD PRESSURE: 112 MMHG | HEART RATE: 72 BPM | RESPIRATION RATE: 16 BRPM | HEIGHT: 73 IN | BODY MASS INDEX: 28.1 KG/M2 | OXYGEN SATURATION: 95 % | TEMPERATURE: 98.4 F | WEIGHT: 212 LBS

## 2020-08-29 DIAGNOSIS — S66.911D WRIST STRAIN, RIGHT, SUBSEQUENT ENCOUNTER: ICD-10-CM

## 2020-08-29 PROCEDURE — 99213 OFFICE O/P EST LOW 20 MIN: CPT | Performed by: FAMILY MEDICINE

## 2020-08-29 ASSESSMENT — ENCOUNTER SYMPTOMS
SENSORY CHANGE: 0
MYALGIAS: 0
FOCAL WEAKNESS: 0
NECK PAIN: 0

## 2020-08-29 ASSESSMENT — FIBROSIS 4 INDEX: FIB4 SCORE: .8804347826086956522

## 2020-08-29 NOTE — LETTER
[facility]  [address] - [place] [address]  Phone:  [phone] - Fax:  [phone]   [facility] Progress Report and Disability Certification  Date of Service: 8/29/2020   No Show:  No  Date / Time of Next Visit: 9/15/2020 [facility] med   Claim Information   Patient Name: Gurmeet Mercer  Claim Number:     Employer: [employer]  Date of Injury: 7/17/2020     Insurer / TPA: [employer] ID / SSN:     Occupation: [employer] Diagnosis: The encounter diagnosis was Wrist strain, right, subsequent encounter.    Medical Information   Related to Industrial Injury? Yes    Subjective Complaints:  DOI: 7/17/2020  No significant improvement to right wrist strain. Pain waxes and wanes, 3/10 worse. Using mouse bothers his wrist as well. Intermittent burning radiation to elbow. Right hand dominant. No prior injury or surgery. No other aggravating or alleviating factors.     Objective Findings: Right wrist: area of perceived pain ulnar aspect. Reproduced with pronation against resistance. Distal neuro/vascular intact. Negative tinel sign at ulnar gutter.      Pre-Existing Condition(s):     Assessment:   Condition Same    Status: Additional Care Required  Permanent Disability:No    Plan: PT  Comments:initiate PT, transfer care to [facility] med    Diagnostics:      Comments:       Disability Information   Status: Released to Restricted Duty    From:  8/29/2020  Through: 9/15/2020 Restrictions are:     Physical Restrictions   Sitting:    Standing:    Stooping:    Bending:      Squatting:    Walking:    Climbing:    Pushing:  < or = to 2 hrs/day   Pulling:  < or = to 2 hrs/day Other:    Reaching Above Shoulder (L):   Reaching Above Shoulder (R):       Reaching Below Shoulder (L):    Reaching Below Shoulder (R):      Not to exceed Weight Limits   Carrying(hrs): 2 Weight Limit(lb): < or = to 10 pounds Lifting(hrs): 2 Weight  Limit(lb): < or = to 10 pounds   Comments:  Restrictions right upper extremity  Please allow wrist splint    Repetitive Actions   Hands: i.e. Fine Manipulations from Grasping:     Feet: i.e. Operating Foot Controls:     Driving / Operate Machinery:     Provider Name:   Tae Brody M.D. Physician Signature:  Physician Name:     Clinic Name / Location: [address]  [place] [address] Clinic Phone Number: Dept: [phone]   Appointment Time: 9:00 Am Visit Start Time: 9:12 AM   Check-In Time:  9:06 Am Visit Discharge Time: 9:40AM   Original-Treating Physician or Chiropractor    Page 2-Insurer/TPA    Page 3-Employer    Page 4-Employee

## 2020-08-29 NOTE — PROGRESS NOTES
"Subjective:      Gurmeet Mercer is a 44 y.o. male who presents with Wrist Injury ( fu)      DOI: 7/17/2020  No significant improvement to right wrist strain. Pain waxes and wanes, 3/10 worse. Using mouse bothers his wrist as well. Intermittent burning radiation to elbow. Right hand dominant. No prior injury or surgery. No other aggravating or alleviating factors.       HPI    Review of Systems   Musculoskeletal: Negative for myalgias and neck pain.        No other joints affected   Skin: Negative for itching and rash.   Neurological: Negative for sensory change and focal weakness.          Objective:     /78   Pulse 72   Temp 36.9 °C (98.4 °F) (Temporal)   Resp 16   Ht 1.854 m (6' 1\")   Wt 96.2 kg (212 lb)   SpO2 95%   BMI 27.97 kg/m²      Physical Exam  Constitutional:       Appearance: Normal appearance.   Skin:     General: Skin is warm and dry.   Neurological:      General: No focal deficit present.      Mental Status: He is alert and oriented to person, place, and time.         Right wrist: area of perceived pain ulnar aspect. Reproduced with pronation against resistance. Distal neuro/vascular intact. Negative tinel sign at ulnar gutter.          Assessment/Plan:        1. Wrist strain, right, subsequent encounter    - REFERRAL TO PHYSICAL THERAPY Reason for Therapy: Eval/Treat/Report  - REFERRAL TO OCCUPATIONAL MEDICINE    Differential diagnosis, natural history, supportive care, and indications for immediate follow-up discussed at length.     Continue light duty on D 39  Given no significant improvement in over 5 weeks will initiate physical therapy and asked [place] to follow-up with our occupational medicine group.  "

## 2020-09-15 ENCOUNTER — HOSPITAL ENCOUNTER (OUTPATIENT)
Dept: LAB | Facility: MEDICAL CENTER | Age: 45
End: 2020-09-15
Payer: COMMERCIAL

## 2020-09-15 ENCOUNTER — OCCUPATIONAL MEDICINE (OUTPATIENT)
Dept: OCCUPATIONAL MEDICINE | Facility: CLINIC | Age: 45
End: 2020-09-15
Payer: COMMERCIAL

## 2020-09-15 VITALS
TEMPERATURE: 97.5 F | HEART RATE: 71 BPM | SYSTOLIC BLOOD PRESSURE: 122 MMHG | RESPIRATION RATE: 14 BRPM | DIASTOLIC BLOOD PRESSURE: 84 MMHG | WEIGHT: 215 LBS | OXYGEN SATURATION: 97 % | HEIGHT: 73 IN | BODY MASS INDEX: 28.49 KG/M2

## 2020-09-15 DIAGNOSIS — S63.501D SPRAIN OF RIGHT WRIST, SUBSEQUENT ENCOUNTER: ICD-10-CM

## 2020-09-15 LAB
BDY FAT % MEASURED: 23 %
BP DIAS: 88 MMHG
BP SYS: 126 MMHG
CHOLEST SERPL-MCNC: 192 MG/DL (ref 100–199)
DIABETES HTDIA: NO
EVENT NAME HTEVT: NORMAL
FASTING HTFAS: YES
GLUCOSE SERPL-MCNC: 94 MG/DL (ref 65–99)
HDLC SERPL-MCNC: 41 MG/DL
HYPERTENSION HTHYP: NO
LDLC SERPL CALC-MCNC: 103 MG/DL
SCREENING LOC CITY HTCIT: NORMAL
SCREENING LOC STATE HTSTA: NORMAL
SCREENING LOCATION HTLOC: NORMAL
SMOKING HTSMO: NO
SUBSCRIBER ID HTSID: NORMAL
TRIGL SERPL-MCNC: 240 MG/DL (ref 0–149)

## 2020-09-15 PROCEDURE — 99213 OFFICE O/P EST LOW 20 MIN: CPT | Performed by: NURSE PRACTITIONER

## 2020-09-15 PROCEDURE — 80061 LIPID PANEL: CPT

## 2020-09-15 PROCEDURE — 36415 COLL VENOUS BLD VENIPUNCTURE: CPT

## 2020-09-15 PROCEDURE — 82947 ASSAY GLUCOSE BLOOD QUANT: CPT

## 2020-09-15 PROCEDURE — S5190 WELLNESS ASSESSMENT BY NONPH: HCPCS

## 2020-09-15 ASSESSMENT — ENCOUNTER SYMPTOMS
CONSTITUTIONAL NEGATIVE: 1
PSYCHIATRIC NEGATIVE: 1
WEAKNESS: 1
SENSORY CHANGE: 0
MYALGIAS: 1
RESPIRATORY NEGATIVE: 1
TINGLING: 0
CARDIOVASCULAR NEGATIVE: 1

## 2020-09-15 ASSESSMENT — PAIN SCALES - GENERAL: PAINLEVEL: 1=MINIMAL PAIN

## 2020-09-15 ASSESSMENT — FIBROSIS 4 INDEX: FIB4 SCORE: .8804347826086956522

## 2020-09-15 NOTE — LETTER
[address],   [address] [place] [address]  Phone:  [phone] - Fax:  [phone]   [facility] Progress Report and Disability Certification  Date of Service: 9/15/2020   No Show:  No  Date / Time of Next Visit: 10/6/2020 @7:30am   Claim Information   Patient Name: Gurmeet Mercer  Claim Number:     Employer: [employer]  Date of Injury:      Insurer / TPA: [employer]  ID / SSN:     Occupation:   Diagnosis: The encounter diagnosis was Sprain of right wrist, subsequent encounter.    Medical Information   Related to Industrial Injury? Yes    Subjective Complaints:  DOI: 7/17/20.  Right wrist pain.  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    Today moderate improvement. Pain is achy in nature, 5/10 at it's worst, and aggravated with lifting. He states that it sometimes feels weak and unstable. Patient denies numbness and tingling. He reports that he is using a wrist brace periodically, primarily with lifting. Patient states that in the beginning he was using Ibuprofen and ice, but has not had to use it as much. Patient is starting physical therapy soon. Patient has been tolerating light duty. Plan of care discussed with patient.    Objective Findings: Right wrist: No obvious deformities or discolorations noted. Neg significant soft tissue tenderness, deformity, swelling, or bruising seen.  He does have mild pain on the lateral right wrist with TTP.  Mild pain with passive flexion otherwise range of motion normal.   strength 5/5.  Distal neurovascular intact.  No snuffbox tenderness. Tinel's, Phalen's, and Finkelstein are negative. FROM.    Pre-Existing Condition(s):     Assessment:   Condition Improved    Status: Additional Care Required  Permanent Disability:No    Plan: PT    Diagnostics:      Comments:  Follow-up in 3 weeks  Work restrictions  Physical therapy appointments as scheduled  Continue with  gentle range of motion and stretching exercises as tolerated   Continue with wearing brace only as needed, wean  Continue with OTC Ibuprofen/Tylenol as   needed   Continue with ice, heat, and topical OTC ointment/creams as needed       Disability Information   Status: Released to Restricted Duty    From:  9/15/2020  Through: 10/6/2020 Restrictions are: Temporary   Physical Restrictions   Sitting:    Standing:    Stooping:    Bending:      Squatting:    Walking:    Climbing:    Pushing:      Pulling:    Other:    Reaching Above Shoulder (L):   Reaching Above Shoulder (R):       Reaching Below Shoulder (L):    Reaching Below Shoulder (R):      Not to exceed Weight Limits   Carrying(hrs):   Weight Limit(lb): < or = to 25 pounds  Comments:Right wrist only Lifting(hrs):   Weight  Limit(lb): < or = to 25 pounds  Comments:Right wrist only   Comments: Avoiding transferring patients    Repetitive Actions   Hands: i.e. Fine Manipulations from Grasping:     Feet: i.e. Operating Foot Controls:     Driving / Operate Machinery:     Provider Name:   MAURO Garrido Physician Signature:  Physician Name:     Clinic Name / Location: [address],   [address] [place], [place] [address] [address] Clinic Phone Number: Dept: [phone]   Appointment Time: 7:30 Am Visit Start Time: 7:38 AM   Check-In Time:  7:30 Am Visit Discharge Time:  8:06am   Original-Treating Physician or Chiropractor    Page 2-Insurer/TPA    Page 3-Employer    Page 4-Employee

## 2020-09-15 NOTE — PROGRESS NOTES
"Subjective:      Gurmeet Mercer is a 44 y.o. male who presents with Follow-Up (DOI: 7/17/2020 right wrist - better - RM 16)      DOI: 7/17/20.  Right wrist pain.  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    Today moderate improvement. Pain is achy in nature, 5/10 at it's worst, and aggravated with lifting. He states that it sometimes feels weak and unstable. Patient denies numbness and tingling. He reports that he is using a wrist brace periodically, primarily with lifting. Patient states that in the beginning he was using Ibuprofen and ice, but has not had to use it as much. Patient is starting physical therapy soon. Patient has been tolerating light duty. Plan of care discussed with patient.      HPI    Review of Systems   Constitutional: Negative.    Respiratory: Negative.    Cardiovascular: Negative.    Musculoskeletal: Positive for joint pain and myalgias.   Skin: Negative.    Neurological: Positive for weakness. Negative for tingling and sensory change.   Psychiatric/Behavioral: Negative.         ROS: All systems were reviewed on intake form, form was reviewed and signed. See scanned documents in media. Pertinent positives and negatives included in HPI.    PMH: No pertinent past medical history to this problem  MEDS: Medications were reviewed in Epic  ALLERGIES:   Allergies   Allergen Reactions   • Sulfa Drugs      SOCHX: Works as MRI Tech at [employer]  FH: No pertinent family history to this problem   Objective:     /84   Pulse 71   Temp 36.4 °C (97.5 °F) (Temporal)   Resp 14   Ht 1.854 m (6' 1\")   Wt 97.5 kg (215 lb)   SpO2 97%   BMI 28.37 kg/m²      Physical Exam  Constitutional:       General: He is not in acute distress.     Appearance: Normal appearance. He is not ill-appearing.   Cardiovascular:      Rate and Rhythm: Normal rate and regular rhythm.      Pulses: Normal pulses.   Pulmonary:      Effort: Pulmonary effort is normal.   Musculoskeletal: " Normal range of motion.         General: Tenderness present. No swelling, deformity or signs of injury.   Skin:     General: Skin is warm and dry.      Capillary Refill: Capillary refill takes less than 2 seconds.      Findings: No bruising or erythema.   Neurological:      General: No focal deficit present.      Mental Status: He is alert and oriented to person, place, and time.      Cranial Nerves: No cranial nerve deficit.      Sensory: No sensory deficit.      Motor: No weakness.      Gait: Gait normal.   Psychiatric:         Mood and Affect: Mood normal.         Behavior: Behavior normal.         Right wrist: No obvious deformities or discolorations noted. Neg significant soft tissue tenderness, deformity, swelling, or bruising seen.  He does have mild pain on the lateral right wrist with TTP.  Mild pain with passive flexion otherwise range of motion normal.   strength 5/5.  Distal neurovascular intact.  No snuffbox tenderness. Tinel's, Phalen's, and Finkelstein are negative. FROM.        Assessment/Plan:        1. Sprain of right wrist, subsequent encounter    Follow-up in 3 weeks   Work restrictions   Physical therapy appointments as scheduled   Continue with gentle range of motion and stretching exercises as tolerated   Continue with wearing brace only as needed, wean   Continue with OTC Ibuprofen/Tylenol as needed   Continue with ice, heat, and topical OTC ointment/creams as needed

## 2020-09-21 ENCOUNTER — PHYSICAL THERAPY (OUTPATIENT)
Dept: PHYSICAL THERAPY | Facility: REHABILITATION | Age: 45
End: 2020-09-21
Attending: FAMILY MEDICINE
Payer: COMMERCIAL

## 2020-09-21 DIAGNOSIS — S66.911D STRAIN OF HAND, RIGHT, SUBSEQUENT ENCOUNTER: ICD-10-CM

## 2020-09-21 PROCEDURE — 97162 PT EVAL MOD COMPLEX 30 MIN: CPT

## 2020-09-21 PROCEDURE — 97110 THERAPEUTIC EXERCISES: CPT

## 2020-09-21 ASSESSMENT — ENCOUNTER SYMPTOMS
QUALITY: ACHING
QUALITY: BURNING
PAIN SCALE AT LOWEST: 0
PAIN SCALE AT HIGHEST: 3
PAIN SCALE: 0

## 2020-09-21 NOTE — OP THERAPY EVALUATION
"  Outpatient Physical Therapy  INITIAL EVALUATION    [facility] Physical Therapy [address]  [address]  [place] [address]  Phone:  [phone]  Fax:  [phone]    Date of Evaluation: 09/21/2020    Patient: Gurmeet Mercer  YOB: 1975  MRN: [id number]     Referring Provider: Tae Brody M.D.  [address]  [address]  [place] [address]   Referring Diagnosis Wrist strain, right, subsequent encounter [S66.911D]     Time Calculation    Start time: 1034  Stop time: 1130 Time Calculation (min): 56 minutes         Chief Complaint: Wrist Problem    Visit Diagnoses     ICD-10-CM   1. Strain of hand, right, subsequent encounter  S66.911D         Subjective:   History of Present Illness:     Date of onset:  7/23/2020    Mechanism of injury:  Patient is a 44 year old male with a PMH including: Plantar fasciitis, tension HA's, ED, regractive amblyopia, visual changes. No surgical hx on file.    Per [facility] notes:  \"DOI: 7/17/20.  Right wrist pain.  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  He did not feel any immediate injury however over the next several days his wrist pain started to increase.  It is mainly on the ulnar side and does cause some burning sensation radiating into the mid lateral forearm.  He has been wearing a brace and has been doing limited lifting at work.  He states on his days off it is fine however after a long week he does feel some pain and weakness.  Patient is right-hand dominant.  No previous injuries to right wrist or hand.  No second job.\"    Pt presents to therapy with complaints of wrist pain after sustaining work related injury on 7/23/20 while helping to move an overweight patient down to the floor after pt reporting knee buckling. He is currently working as an MRI technician for [employer]; working light duty computer work and 10# lifting restriction; pt reports is not assisting with pt transfers. Reports has a wrist brace " and utilizes this intermittently for work but occasionally has increased pain using brace with computer work especially with wrist pronation/supination. Denies n/t and changes in strength. Reports intermittent popping/clicking.           Prior level of function:  Full duty MRI tech at [employer]  Pain:     Current pain ratin    At best pain ratin    At worst pain rating:  3    Location:  Lateral ulnar dorsal aspect of R wrist     Quality:  Burning and aching (one occasiona burning/stabbing)    Progression:  Improving    Pain Comments::  Aggravating: lifting milk jug (initially, less now), using mouse (prontation/supination), washing back, end of work week (3x 12 hour shifts)    Relieving: rest, Ibuprofen (initially only), pulling the wrist  Hand dominance:  Right  Diagnostic Tests:     Diagnostic Tests Comments:  X ray wrist 20:  FINDINGS:  There is no evidence of fracture or dislocation. Alignment is maintained. No periosteal new bone formation or osseous erosion is identified. There appears to be osseous fusion between the capitate and hamate.        IMPRESSION:     No evidence of acute fracture or dislocation.    Treatments:     Treatment Comments:  Ibuprofen  Wrist brace  Activities of Daily Living:     Patient reported ADL status: Patient's current daily routine includes:  Work: currently working as an MRI technician for [employer]; working light duty; 3 x12 hour shifts.     Current light duty: Computer work, set up for imaging, moving equipment (10-15#)    Full duty requirements: Patient transfers, lifting/pulling/pushing 75# (per pt is in official job description), moving equipment 10-15#    Restrictions: No lifting >10#    Exercise: Wrist stretches (flexion and extension) given from latest appt (causing burning per pt)         Patient Goals:     Patient goals for therapy:  Return to work    Other patient goals:  Get R wrist back to normal       Past Medical History:   Diagnosis Date   • Plantar  fasciitis 6/8/2009   • Tension headache 6/5/2009     No past surgical history on file.  Social History     Tobacco Use   • Smoking status: Former Smoker   • Smokeless tobacco: Never Used   Substance Use Topics   • Alcohol use: Not Currently     Family and Occupational History     Socioeconomic History   • Marital status:      Spouse name: Not on file   • Number of children: Not on file   • Years of education: Not on file   • Highest education level: Not on file   Occupational History   • Not on file       Objective     Observations     Additional Observation Details  Posture: Poor seated; kyphosis; forward head and rounded shoulders    Neurological Testing     Sensation     Wrist/Hand   Left   Intact: light touch    Right   Intact: light touch    Tenderness     Right Elbow   No tenderness in the common extensor tendon, lateral epicondyle and medial epicondyle.     Right Wrist/Hand   Tenderness in the triangular fibrocartilage complex . No tenderness in the common extensor tendon, distal radioulnar joint, lateral epicondyle and medial epicondyle.     Additional Tenderness Details  No tenderness with capal mobilization grade III    Active Range of Motion     Additional Active Range of Motion Details  Neck AROM:  Neck AROM WFL    Shoulder AROM:  B Shoulder AROM WFL     Elbow AROM:   B Elbow flex/ext WFL   Limited pronation/supination Bilaterally  R supination limited (reproductive of pt's pain)  R elbow flexion: 140 deg  R elbow extension: WFL    Elbow PROM: Pain reproduction with elbow supination R    Wrist AROM:  R wrist AROM:  80 extension *Reproduces some discomfort  90 flexion no pain   25 Rad deviation  50 Ulnar deviation     Wrist PROM:  Pain reproduced with radial deviation and extension R      Strength:      Additional Strength Details  Wrist isometrics: (Tested with 0 deg GH abd and elbow 90 deg flexed)    Wrist flexion: Strong and painless  Wrist extension: Strong and painful  Wrist ulnar deviation:  Strong and painful  Wrist radial deviation: Strong and painful     strength (Measured in lbs):    L: 80, 90, 80#  R: 80, 100,100#      Tests     Additional Tests Details  Negative TFCC compress and shear test        Therapeutic Exercises (CPT 68415):     1. Posture education, x2 min    2. Wrist flexion eccentrics, x10, hep    3. Wrist ext eccentrics, x10, hep    4. Rad deviation eccentrics, x10, hep    5. Ulnar deviation eccentrics, x10, hep    6. Pt education, re: wrist anatomy using visual aides, hep      Therapeutic Exercise Summary: Pt performed these exercises with instruction and SPV.  Provided handout with these exercises for daily HEP.        Time-based treatments/modalities:    Physical Therapy Timed Treatment Charges  Therapeutic exercise minutes (CPT 86667): 10 minutes      Assessment, Response and Plan:   Impairments: abnormal or restricted ROM, activity intolerance, difficulty performing job, lacks appropriate home exercise program, limited ADL's and pain with function    Assessment details:  Pt is a pleasant 44 year old male presents to therapy with complaints of wrist pain after sustaining work related injury on 7/23/20 while helping to move an overweight patient down to the floor after pt reporting knee buckling. 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 Reports has a wrist brace and utilizes this intermittently for work but occasionally has increased pain using brace with computer work especially with wrist pronation/supination. Denies n/t and changes in strength. Reports intermittent popping/clicking.     Neck and shoulder exam normal. Of note, limitations in pronation/supination observed Bilat elbows. Able to reproduce pt's pain with AROM/PROM elbow supination, wrist extension and rad/ulnar deviations. Symptoms likely consistent with TFCC irritation.    Pt may benefit  from skilled physical therapy in order to address above impairmentsin order to improve QOL, ADL's and return to reported work without restrictions.    Light duty Restrictions: No lifting >10#  Barriers to therapy:  None  Goals:   Short Term Goals:   1. Pt will be independent with written HEP.  2. Pt will be able to perform elbow and wrist AROM without increase in symptoms.    Short term goal time span:  2-4 weeks      Long Term Goals:    1. Pt will be independent with written HEP.  2. Pt will have a significant improvement in UEFI score (eval 58/80)  3. Pt will be able to lift 10# consistently using safe lifting mechanics without increase in s/s.  4. Pt will be able to return to work without restrictions.    Long term goal time span:  6-8 weeks    Plan:   Therapy options:  Physical therapy treatment to continue  Planned therapy interventions:  E Stim Unattended (CPT 65986), Manual Therapy (CPT 10457), Neuromuscular Re-education (CPT 88484), Therapeutic Exercise (CPT 33467) and Therapeutic Activities (CPT 67874)  Frequency:  2x week  Duration in weeks:  4  Duration in visits:  6  Discussed with:  Patient  Plan details:  UPOC: 10/23/20          Functional Assessment Used  PT Functional Assessment Tool Used: UEFI  PT Functional Assessment Score: 58/80     Referring provider co-signature:  I have reviewed this plan of care and my co-signature certifies the need for services.    Certification Period: 09/21/2020 to  10/23/20    Physician Signature: ________________________________ Date: ______________

## 2020-09-23 ENCOUNTER — PHYSICAL THERAPY (OUTPATIENT)
Dept: PHYSICAL THERAPY | Facility: REHABILITATION | Age: 45
End: 2020-09-23
Attending: FAMILY MEDICINE
Payer: COMMERCIAL

## 2020-09-23 ENCOUNTER — APPOINTMENT (OUTPATIENT)
Dept: PHYSICAL THERAPY | Facility: REHABILITATION | Age: 45
End: 2020-09-23
Payer: COMMERCIAL

## 2020-09-23 DIAGNOSIS — S66.911D STRAIN OF HAND, RIGHT, SUBSEQUENT ENCOUNTER: ICD-10-CM

## 2020-09-23 PROCEDURE — 999999 HB NO CHARGE

## 2020-09-23 NOTE — OP THERAPY DAILY TREATMENT
Erroneous encounter; please disregard. Pt reporting wife has sore throat and is being tested for COVID-19. Test turn anticipated next few days.    If negative; pt to return at next appt.   If positive, will follow up accordingly and assess if insurance covers tele health visits in the interim. Plan of care to be determined pending test results.    Darin Castillo, PT, DPT

## 2020-09-23 NOTE — OP THERAPY DAILY TREATMENT
Outpatient Physical Therapy  DAILY TREATMENT     [facility] [address]  [address]  [place] [address]  Phone:  [phone]  Fax:  [phone]    Date: 09/23/2020    Patient: Gurmeet Mercer  YOB: 1975  MRN: [id number]     Time Calculation                   Chief Complaint: No chief complaint on file.    Visit #: 2    SUBJECTIVE:      OBJECTIVE:  Current objective measures:           Therapeutic Exercises (CPT 89871):     1. Posture education, x2 min    2. Wrist flexion eccentrics, x10, hep    3. Wrist ext eccentrics, x10, hep    4. Rad deviation eccentrics, x10, hep    5. Ulnar deviation eccentrics, x10, hep    6. Pt education, re: wrist anatomy using visual aides, hep      Therapeutic Exercise Summary: Pt performed these exercises with instruction and SPV.  Provided handout with these exercises for daily HEP.      Therapeutic Treatments and Modalities:     Therapeutic Treatment and Modalities Summary: R Elbow distraction grade III and IV with pt hooklying; humeroradial posterior glide grade III and IV; PA glide grade III and IV to proximal RUJ then AP glide grade III and IV to Distal RUJ    Supination pre txt:    Supination Post txt:     Time-based treatments/modalities:           Pain rating (1-10) before treatment:  {PAIN NUMBERS_1-10:56295}  Pain rating (1-10) after treatment:  {PAIN NUMBERS_1-10:59807}    ASSESSMENT:   Response to treatment:     PLAN/RECOMMENDATIONS:   Plan for treatment: therapy treatment to continue next visit.  Planned interventions for next visit: continue with current treatment.

## 2020-09-25 NOTE — OP THERAPY DAILY TREATMENT
Outpatient Physical Therapy  DAILY TREATMENT     [address]  [address]  [place] [address]  Phone:  [phone]  Fax:  [phone]    Date: 09/28/2020    Patient: Gurmeet Mercer  YOB: 1975  MRN: [id number]     Time Calculation    Start time: 0901  Stop time: 0945 Time Calculation (min): 44 minutes         Chief Complaint: Wrist Problem    Visit #: 2    SUBJECTIVE:  My wrist has actually bothered me more this week. I haven't been that great with my exercises. It's hard to say if the exercises are aggravating or not, I haven't done them a lot.       OBJECTIVE:  Current objective measures:        AROM:  Lacking 15-20 deg terminal supination R wrist  40 deg R wrist extension    Therapeutic Exercises (CPT 10101):     1. Posture education, x2 min    2. Verbal review of hep    3. Wrist extensor stretch, x30sec, hep    4. Wrist flexor stretch, x30 sec, hep    5. UBE, x2 min, increase R wrist pain; discontinued      Therapeutic Exercise Summary: Pt performed these exercises with instruction and SPV.  Provided handout with these exercises for daily HEP.      Therapeutic Treatments and Modalities:     1. Manual Therapy (CPT 36130), see below    Therapeutic Treatment and Modalities Summary: R Elbow distraction grade III and IV with pt hooklying; humeroradial posterior glide grade III and IV; PA glide grade III and IV to proximal RUJ then AP glide grade III and IV to Distal RUJ. Followed by AP and PA mobilizations to proximal carpals grade III, manual flexor and extensor stretch        Time-based treatments/modalities:    Physical Therapy Timed Treatment Charges  Manual therapy minutes (CPT 79756): 18 minutes  Therapeutic exercise minutes (CPT 08174): 11 minutes      ASSESSMENT:   Response to treatment:   Pt reporting increase R wrist aggravation secondary to work this week. Pt reporting only partial compliance to hep. Continues to present with limitations  in supination and wrist extension. No major increase in AROM or decrease in pain with movement following manual therapy today. Edu pt for increased compliance with hep; updated hep with stretching due to increased tone noted in common flexor and extensor regions.      PLAN/RECOMMENDATIONS:   Plan for treatment: therapy treatment to continue next visit.  Planned interventions for next visit: continue with current treatment. STM to common flexors and extensors; manual to incr wrist ext and ulnar deviation.

## 2020-09-28 ENCOUNTER — PHYSICAL THERAPY (OUTPATIENT)
Dept: PHYSICAL THERAPY | Facility: REHABILITATION | Age: 45
End: 2020-09-28
Attending: FAMILY MEDICINE
Payer: COMMERCIAL

## 2020-09-28 DIAGNOSIS — S66.911D STRAIN OF HAND, RIGHT, SUBSEQUENT ENCOUNTER: ICD-10-CM

## 2020-09-28 PROCEDURE — 97014 ELECTRIC STIMULATION THERAPY: CPT

## 2020-09-28 PROCEDURE — 97140 MANUAL THERAPY 1/> REGIONS: CPT

## 2020-09-28 PROCEDURE — 97110 THERAPEUTIC EXERCISES: CPT

## 2020-10-06 ENCOUNTER — OCCUPATIONAL MEDICINE (OUTPATIENT)
Dept: OCCUPATIONAL MEDICINE | Facility: CLINIC | Age: 45
End: 2020-10-06
Payer: COMMERCIAL

## 2020-10-06 VITALS
OXYGEN SATURATION: 94 % | WEIGHT: 220 LBS | DIASTOLIC BLOOD PRESSURE: 84 MMHG | BODY MASS INDEX: 29.16 KG/M2 | HEIGHT: 73 IN | HEART RATE: 75 BPM | TEMPERATURE: 97.8 F | SYSTOLIC BLOOD PRESSURE: 122 MMHG | RESPIRATION RATE: 14 BRPM

## 2020-10-06 DIAGNOSIS — S63.501D SPRAIN OF RIGHT WRIST, SUBSEQUENT ENCOUNTER: ICD-10-CM

## 2020-10-06 PROCEDURE — 99213 OFFICE O/P EST LOW 20 MIN: CPT | Performed by: NURSE PRACTITIONER

## 2020-10-06 ASSESSMENT — ENCOUNTER SYMPTOMS
MYALGIAS: 1
PSYCHIATRIC NEGATIVE: 1
CONSTITUTIONAL NEGATIVE: 1
RESPIRATORY NEGATIVE: 1
CARDIOVASCULAR NEGATIVE: 1
NEUROLOGICAL NEGATIVE: 1

## 2020-10-06 ASSESSMENT — PAIN SCALES - GENERAL: PAINLEVEL: 2=MINIMAL-SLIGHT

## 2020-10-06 ASSESSMENT — FIBROSIS 4 INDEX: FIB4 SCORE: .8804347826086956522

## 2020-10-06 NOTE — LETTER
[address],   [address] [place] [address]  Phone:  [phone] - Fax:  [phone]   Occupational Health [facility] Progress Report and Disability Certification  Date of Service: 10/6/2020   No Show:  No  Date / Time of Next Visit: 10/27/2020 @ 7:30 AM   Claim Information   Patient Name: Gurmeet Mercer  Claim Number:     Employer: [employer]  Date of Injury: 7/17/2020     Insurer / TPA: Workers Choice  ID / SSN:     Occupation: MRI TECH  Diagnosis: The encounter diagnosis was Sprain of right wrist, subsequent encounter.    Medical Information   Related to Industrial Injury? Yes    Subjective Complaints:  DOI: 7/17/20.  Right wrist pain.  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     Today moderate improvement. Pain is intermittent in nature, one episode of radiating sharp pain from wrist to forearm, and aggravated with lifting.  Patient denies numbness and tingling. He reports that he is using a wrist brace periodically, primarily with lifting.  Discussed the importance of weaning brace completely to allow for complete healing of wrist.  Patient verbalizes understanding.  Patient reports intermittent ibuprofen and ice.  He notes he is also using a TENS unit at home with moderate relief.  Patient is attending physical therapy, but unsure if it is helping at this time. Patient has been tolerating light duty. Plan of care discussed with patient.     Objective Findings: Right wrist: No obvious deformities or discolorations noted. Neg significant soft tissue tenderness, deformity, swelling, or bruising seen.  He does have mild pain on the lateral right wrist with TTP.  Mild intermittent pain with passive flexion otherwise range of motion normal.   strength 5/5.  Distal neurovascular intact.  No snuffbox tenderness. Tinel's, Phalen's, and Finkelstein are negative. FROM.   Pre-Existing Condition(s):     Assessment:   Condition  Improved    Status: Additional Care Required  Permanent Disability:No    Plan: PT    Diagnostics:      Comments:  Follow-up in 3 weeks   Work restrictions   Physical therapy appointments as scheduled   Continue with gentle range of motion and stretching exercises as tolerated   Wean wrist brace completely  Continue with OTC Ibuprofen/Tylenol as needed   Continue   with ice, heat, and topical OTC ointment/creams as needed  Continue with gentle range of motion and stretching exercises as tolerated  Try warm Epson salt soaks      Disability Information   Status: Released to Restricted Duty    From:  10/6/2020  Through: 10/27/2020 Restrictions are: Temporary   Physical Restrictions   Sitting:    Standing:    Stooping:    Bending:      Squatting:    Walking:    Climbing:    Pushing:      Pulling:    Other:    Reaching Above Shoulder (L):   Reaching Above Shoulder (R):       Reaching Below Shoulder (L):    Reaching Below Shoulder (R):      Not to exceed Weight Limits   Carrying(hrs):   Weight Limit(lb): < or = to 25 pounds  Comments:Right wrist/hand only Lifting(hrs):   Weight  Limit(lb): < or = to 25 pounds  Comments:Right wrist/hand only   Comments: Avoiding transferring patients    Repetitive Actions   Hands: i.e. Fine Manipulations from Grasping:     Feet: i.e. Operating Foot Controls:     Driving / Operate Machinery:     Provider Name:   MAURO Garrido Physician Signature:  Physician Name:     Clinic Name / Location: [address] [address] Clinic Phone Number: Dept: [phone]   Appointment Time: 7:30 Am Visit Start Time: 7:37 AM   Check-In Time:  7:30 Am Visit Discharge Time: 8 AM    Original-Treating Physician or Chiropractor    Page 2-Insurer/TPA    Page 3-Employer    Page 4-Employee

## 2020-10-06 NOTE — PROGRESS NOTES
"Subjective:      Gurmeet Mercer is a 44 y.o. male who presents with Follow-Up (WC DOI: 7/17/2020 right wrist - same - RM 16)      DOI: 7/17/20.  Right wrist pain.  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     Today moderate improvement. Pain is intermittent in nature, one episode of radiating sharp pain from wrist to forearm, and aggravated with lifting.  Patient denies numbness and tingling. He reports that he is using a wrist brace periodically, primarily with lifting.  Discussed the importance of weaning brace completely to allow for complete healing of wrist.  Patient verbalizes understanding.  Patient reports intermittent ibuprofen and ice.  He notes he is also using a TENS unit at home with moderate relief.  Patient is attending physical therapy, but unsure if it is helping at this time. Patient has been tolerating light duty. Plan of care discussed with patient.       HPI    Review of Systems   Constitutional: Negative.    Respiratory: Negative.    Cardiovascular: Negative.    Musculoskeletal: Positive for joint pain and myalgias.   Skin: Negative.    Neurological: Negative.    Psychiatric/Behavioral: Negative.         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  FH: No pertinent family history to this problem.   Objective:     /84   Pulse 75   Temp 36.6 °C (97.8 °F) (Temporal)   Resp 14   Ht 1.854 m (6' 1\")   Wt 99.8 kg (220 lb)   SpO2 94%   BMI 29.03 kg/m²      Physical Exam  Constitutional:       General: He is not in acute distress.     Appearance: Normal appearance. He is not ill-appearing.   Cardiovascular:      Rate and Rhythm: Normal rate and regular rhythm.      Pulses: Normal pulses.   Pulmonary:      Effort: Pulmonary effort is normal.   Musculoskeletal: Normal range of motion.         General: Tenderness present. No swelling, deformity or signs of injury.   Skin:     General: Skin is warm and dry.      Capillary Refill: Capillary refill takes less " than 2 seconds.      Findings: No bruising or erythema.   Neurological:      General: No focal deficit present.      Mental Status: He is alert and oriented to person, place, and time.      Cranial Nerves: No cranial nerve deficit.      Sensory: No sensory deficit.      Motor: No weakness.      Gait: Gait normal.      Deep Tendon Reflexes: Reflexes normal.   Psychiatric:         Mood and Affect: Mood normal.         Behavior: Behavior normal.         Right wrist: No obvious deformities or discolorations noted. Neg significant soft tissue tenderness, deformity, swelling, or bruising seen.  He does have mild pain on the lateral right wrist with TTP.  Mild intermittent pain with passive flexion otherwise range of motion normal.   strength 5/5.  Distal neurovascular intact.  No snuffbox tenderness. Tinel's, Phalen's, and Finkelstein are negative. FROM.       Assessment/Plan:        1. Sprain of right wrist, subsequent encounter      Follow-up in 3 weeks   Work restrictions   Physical therapy appointments as scheduled   Continue with gentle range of motion and stretching exercises as tolerated   Wean wrist brace completely   Continue with OTC Ibuprofen/Tylenol as needed   Continue with ice, heat, and topical OTC ointment/creams as needed   Continue with gentle range of motion and stretching exercises as tolerated   Try warm Epson salt soaks

## 2020-10-09 ENCOUNTER — APPOINTMENT (OUTPATIENT)
Dept: PHYSICAL THERAPY | Facility: REHABILITATION | Age: 45
End: 2020-10-09
Attending: FAMILY MEDICINE
Payer: COMMERCIAL

## 2020-10-14 ENCOUNTER — PHYSICAL THERAPY (OUTPATIENT)
Dept: PHYSICAL THERAPY | Facility: REHABILITATION | Age: 45
End: 2020-10-14
Attending: FAMILY MEDICINE
Payer: COMMERCIAL

## 2020-10-14 DIAGNOSIS — S66.911D STRAIN OF HAND, RIGHT, SUBSEQUENT ENCOUNTER: ICD-10-CM

## 2020-10-14 PROCEDURE — 97110 THERAPEUTIC EXERCISES: CPT

## 2020-10-14 PROCEDURE — 97140 MANUAL THERAPY 1/> REGIONS: CPT

## 2020-10-14 PROCEDURE — 97014 ELECTRIC STIMULATION THERAPY: CPT

## 2020-10-14 NOTE — OP THERAPY DAILY TREATMENT
Outpatient Physical Therapy  DAILY TREATMENT     [facility] Physical [address]  [address]  [place] [address]  Phone:  [phone]  Fax:  [phone]    Date: 10/14/2020    Patient: Gurmeet Mercer  YOB: 1975  MRN: [id number]     Time Calculation    Start time: 1405  Stop time: 1446 Time Calculation (min): 41 minutes         Chief Complaint: Wrist Problem    Visit #: 3    SUBJECTIVE:  I didn't do all the homework because I have some shoulder impingement pain. The more I use it the more it hurts. Some days are better than others. I think my wrist hurt a lot when I saw Darron Eaton because I was playing fetch with my dog the day before.      OBJECTIVE:  Current objective measures:        AROM:  Lacking 15-20 deg terminal supination R wrist  40 deg R wrist extension    Therapeutic Exercises (CPT 81519):     1. Verbal review of hep, x2 min, reviewed with visual aides    2. Wrist extensor eccentrics wtih 4#, 10x; manual assist from opp hand to bring into extension, emphasis on eccentrics only, fatigue; no increase in pain    3. Pt education, perform hep only as written    4. Self massage, educated pt to perform self massage at common extensor bundle prior to engaging in hep x 2 min, added to hep      Therapeutic Exercise Summary:       Therapeutic Treatments and Modalities:     1. Manual Therapy (CPT 59683), see below    Therapeutic Treatment and Modalities Summary: IASTM to common extensor bundle R with forearm resting in pronation on table  Followed by cupping x 2 min (two cups placed at proximal forearm in areas of pain). Application of cups followed by active wrist extension.  Pt educated on side effects including bruising and incr sensitivity x several days; additionally edu pt on purpose of txt. Pt gave verbal consent for treatment// Increased erythema after removing of cups; fatigue following ex and decrease pain overall.        Time-based  treatments/modalities:    Physical Therapy Timed Treatment Charges  Manual therapy minutes (CPT 09627): 14 minutes  Therapeutic exercise minutes (CPT 66352): 12 minutes  Pain pre treatment: Some abnormal sensation     ASSESSMENT:   Response to treatment:   Pt reporting intermittent pain with work and activity. Able to decrease R wrist pain with active wrist movements following manual therapy and cupping today. Possible contribution of common extensors based on today's visit. Edu pt to begin self massage to R forearm prior to hep.       PLAN/RECOMMENDATIONS:   Plan for treatment: therapy treatment to continue next visit.  Planned interventions for next visit: continue with current treatment. Review response to last session, self massage hep.

## 2020-10-20 ENCOUNTER — PHYSICAL THERAPY (OUTPATIENT)
Dept: PHYSICAL THERAPY | Facility: REHABILITATION | Age: 45
End: 2020-10-20
Attending: INTERNAL MEDICINE
Payer: COMMERCIAL

## 2020-10-20 DIAGNOSIS — S66.911D STRAIN OF HAND, RIGHT, SUBSEQUENT ENCOUNTER: ICD-10-CM

## 2020-10-20 PROCEDURE — 97110 THERAPEUTIC EXERCISES: CPT

## 2020-10-20 PROCEDURE — 97140 MANUAL THERAPY 1/> REGIONS: CPT

## 2020-10-20 NOTE — OP THERAPY DAILY TREATMENT
Outpatient Physical Therapy  DAILY TREATMENT     [facility] Physical [address]  [address]  [place] [address]  Phone:  [phone]  Fax:  [phone]    Date: 10/20/2020    Patient: Gurmeet Mercer  YOB: 1975  MRN: [id number]     Time Calculation    Start time: 1402  Stop time: 1435 Time Calculation (min): 33 minutes         Chief Complaint: Wrist Problem    Visit #: 4    SUBJECTIVE:  I was really sore after the last session in that spot. Then that was feeling better and then my wrist was sore again. There was some overlap in pain at the wrist and the forearm.          Therapeutic Exercises (CPT 57293):     1. Verbal review of hep, x2 min, reviewed with visual aides    2. Regressed wrist extension eccentrics>no weight (manual resistance to tolerance only)    11. UPOC: 10/23/20      Therapeutic Exercise Summary:       Therapeutic Treatments and Modalities:     1. Manual Therapy (CPT 28392), see below    Therapeutic Treatment and Modalities Summary: KinesioTaping to wrist extensors along length of forearm with wrist pre-positioned in wrist extension followed by tape perpendicular to common extensor with 50% stretch.        Time-based treatments/modalities:    Physical Therapy Timed Treatment Charges  Manual therapy minutes (CPT 96477): 11 minutes  Therapeutic exercise minutes (CPT 54941): 22 minutes     Pain pre treatment: 1-2/10    Pain post treatment: 1-2/10    ASSESSMENT:   See progress note      PLAN/RECOMMENDATIONS:   Plan for treatment: therapy treatment to continue next visit.  Planned interventions for next visit: continue with current treatment. See progress note

## 2020-10-21 NOTE — OP THERAPY PROGRESS SUMMARY
"  Outpatient Physical Therapy  PROGRESS SUMMARY NOTE      [facility] Physical Therapy [place] [address]  [address]  [place] [address]  Phone:  [phone]  Fax:  [phone]    Date of Visit: 10/20/2020    Patient: Gurmeet Mercer  YOB: 1975  MRN: [id number]     Referring Provider: Tae Brody M.D.  [address]  [address]  [place] [address]   Referring Diagnosis Cervicalgia [M54.2];Stiffness of joint, not elsewhere classified,  shoulder region [M25.619]     Visit Diagnoses     ICD-10-CM   1. Strain of hand, right, subsequent encounter  S66.715N       Rehab Potential: good    Progress Report Period: 9/21/20-10/21/20    Functional Assessment Used  PT Functional Assessment Tool Used: UEFI  PT Functional Assessment Score: 64       Objective Findings and Assessment:   Patient progression towards goals: Pt has attended 4 visits of physical therapy and reports 50% improvement since initiating physical therapy. Pt has been partially compliant with hep. 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    Despite reported improvements, pt continues to experience discomfort with wrist AROM and may benefit from completion of remaining two approved visits. Pt was unable to complete these two sessions within time frame due to scheduling conflicts (pt's wife had COVID testing; therapist out of office).     Pt initially approved for 6 sessions and has completed 4. As pt's job has repetitive and laborious requirements. Pt has 2 remaining approved sessions of skilled physical therapy. Requesting extension as pt may benefit from completion of these two visits to further work on pain-free AROM, strengthening, postural education and pt education to facilitate safe return to work and prevent future work injuries/prevent future medical " costs.      Objective findings and assessment details: PT Functional Assessment Tool Used: UEFI  PT Functional Assessment Score: 64      Active Range of Motion     Additional Active Range of Motion Details  Neck AROM:  Neck AROM WFL    Shoulder AROM:  B Shoulder AROM WFL     Elbow AROM:   B Elbow flex/ext WFL   Limited pronation/supination Bilaterally  R supination limited (reproductive of pt's pain)    Wrist AROM:  R wrist AROM:  80; end range wrist extension *Reproduces discomfort and s/s  90 flexion no pain   25 Rad deviation  50 Ulnar deviation       Wrist PROM:  Pain reproduced with radial deviation and extension R        Strength:      Additional Strength Details  Wrist isometrics: (Tested with 0 deg GH abd and elbow 90 deg flexed)    Wrist flexion: Strong and painless  Wrist extension: Strong and painful  Wrist ulnar deviation: Strong and painful  Wrist radial deviation: Strong and painful    TTP: Common extensor bundle R    Goals:   Short Term Goals:   Goals:   Short Term Goals:   1. Pt will be independent with written HEP. (Partially met)  2. Pt will be able to perform elbow and wrist AROM without increase in symptoms. (Not met)         Short term goal time span:  2-4 weeks      Long Term Goals:    Long Term Goals:    1. Pt will be independent with written HEP. (Partially met)  2. Pt will have a significant improvement in UEFI score (eval 58/80) (Not met; MDC is 9 pts)  3. Pt will be able to lift 10# consistently using safe lifting mechanics without increase in s/s. (Partially met)  4. Pt will be able to return to work without restrictions. (Not met)         Long term goal time span:  4-6 weeks    Plan:   Planned therapy interventions:  Neuromuscular Re-education (CPT 15105), Manual Therapy (CPT 46847), E Stim Unattended (CPT 62978), Therapeutic Activities (CPT 48125) and Therapeutic Exercise (CPT 99639)  Frequency:  1x week  Duration in visits:  2  Plan details:  UPOC: 11/20/20    **Requesting extension as  pt may benefit from completion of remaining two already approved visits to further work on pain-free AROM, strengthening, postural education and pt education to facilitate safe return to work and prevent future work injuries/prevent future medical costs.    *POC extended to allow for lag in auth time      Referring provider co-signature:  I have reviewed this plan of care and my co-signature certifies the need for services.     Certification Period: 10/20/2020 to 11/20/20    Physician Signature: ________________________________ Date: ______________

## 2020-11-04 ENCOUNTER — OCCUPATIONAL MEDICINE (OUTPATIENT)
Dept: OCCUPATIONAL MEDICINE | Facility: CLINIC | Age: 45
End: 2020-11-04
Payer: COMMERCIAL

## 2020-11-04 VITALS
RESPIRATION RATE: 14 BRPM | SYSTOLIC BLOOD PRESSURE: 120 MMHG | OXYGEN SATURATION: 97 % | DIASTOLIC BLOOD PRESSURE: 74 MMHG | HEART RATE: 74 BPM | WEIGHT: 220 LBS | BODY MASS INDEX: 29.16 KG/M2 | HEIGHT: 73 IN

## 2020-11-04 DIAGNOSIS — S63.501D SPRAIN OF RIGHT WRIST, SUBSEQUENT ENCOUNTER: ICD-10-CM

## 2020-11-04 PROCEDURE — 99213 OFFICE O/P EST LOW 20 MIN: CPT | Performed by: NURSE PRACTITIONER

## 2020-11-04 ASSESSMENT — ENCOUNTER SYMPTOMS
MYALGIAS: 1
NEUROLOGICAL NEGATIVE: 1
PSYCHIATRIC NEGATIVE: 1
CARDIOVASCULAR NEGATIVE: 1
RESPIRATORY NEGATIVE: 1
CONSTITUTIONAL NEGATIVE: 1

## 2020-11-04 ASSESSMENT — PAIN SCALES - GENERAL: PAINLEVEL: 2=MINIMAL-SLIGHT

## 2020-11-04 ASSESSMENT — FIBROSIS 4 INDEX: FIB4 SCORE: .9

## 2020-11-04 NOTE — LETTER
[address],   [address] [place] [address]  Phone:  [phone] - Fax:  [phone]   Occupational Health [facility] Progress Report and Disability Certification  Date of Service: 11/4/2020   No Show:  No  Date / Time of Next Visit: 11/19/2020 @ 2:30pm   Claim Information   Patient Name: Gurmeet Mercer  Claim Number:     Employer: [employer]  Date of Injury: 7/17/2020     Insurer / TPA: Workers Choice  ID / SSN:     Occupation: [employer] TECH  Diagnosis: The encounter diagnosis was Sprain of right wrist, subsequent encounter.    Medical Information   Related to Industrial Injury? Yes    Subjective Complaints:  DOI: 7/17/20.  Right wrist pain.  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 Today reports continued gradual improvement. He states that his wrist continues to be achy and intermittent in nature. He denies weakness, numbness, or tingling in his wrist. He is not using anything for pain at this time. He has been attending physical therapy with some moderate improvement. He has weaned to wrist brace without difficulty. He is tolerating light duty without difficulty. Plan of care discussed with patient.    Objective Findings: Right wrist: No obvious deformities or discolorations noted. Neg significant soft tissue tenderness, deformity, swelling, or bruising seen.  Mild pain on the lateral right wrist with TTP.  Mild intermittent pain with passive flexion otherwise range of motion normal.   strength 5/5.  Distal neurovascular intact.  No snuffbox tenderness. Tinel's, Phalen's, and Finkelstein are negative. FROM.   Pre-Existing Condition(s):     Assessment:   Condition Improved    Status: Additional Care Required  Permanent Disability:No    Plan: PTDiagnostics    Diagnostics: MRI    Comments:  Follow-up in 2 weeks  Restricted duty   Consider physiatry referral if symptoms persist  MRI ordered   Continue with Physical therapy  sessions as scheduled, additional sessions ordered   Continue with OTC Tylenol/Ibuprofen as needed   Continue with i  ce and heat as needed   Gentle range of motion and stretching exercises as tolerated    Disability Information   Status: Released to Restricted Duty    From:  11/4/2020  Through: 11/19/2020 Restrictions are: Temporary   Physical Restrictions   Sitting:    Standing:    Stooping:    Bending:      Squatting:    Walking:    Climbing:    Pushing:      Pulling:    Other:    Reaching Above Shoulder (L):   Reaching Above Shoulder (R):       Reaching Below Shoulder (L):    Reaching Below Shoulder (R):      Not to exceed Weight Limits   Carrying(hrs):   Weight Limit(lb): < or = to 25 pounds  Comments:Right wrist/hand only Lifting(hrs):   Weight  Limit(lb): < or = to 25 pounds  Comments:Right wrist/hand only   Comments: Avoiding transferring patients    Repetitive Actions   Hands: i.e. Fine Manipulations from Grasping:     Feet: i.e. Operating Foot Controls:     Driving / Operate Machinery:     Provider Name:   MAURO Garrido Physician Signature:  Physician Name:     Clinic Name / Location: [address],   [address] [address] Clinic Phone Number: Dept: [phone]   Appointment Time: 3:45 Pm Visit Start Time: 3:40 PM   Check-In Time:  3:36 Pm Visit Discharge Time:  4:04pm   Original-Treating Physician or Chiropractor    Page 2-Insurer/TPA    Page 3-Employer    Page 4-Employee

## 2020-11-05 NOTE — PROGRESS NOTES
"Subjective:      Gurmeet Mercer is a 45 y.o. male who presents with Follow-Up (WC DOI: 7/17/2020 right wrist - better - RM 16)      DOI: 7/17/20.  Right wrist pain.  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 Today reports continued gradual improvement. He states that his wrist continues to be achy and intermittent in nature. He denies weakness, numbness, or tingling in his wrist. He is not using anything for pain at this time. He has been attending physical therapy with some moderate improvement. He has weaned to wrist brace without difficulty. He is tolerating light duty without difficulty. Plan of care discussed with patient.      HPI    Review of Systems   Constitutional: Negative.    Respiratory: Negative.    Cardiovascular: Negative.    Musculoskeletal: Positive for joint pain and myalgias.   Skin: Negative.    Neurological: Negative.    Psychiatric/Behavioral: Negative.         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  FH: No pertinent family history to this problem.   Objective:     /74   Pulse 74   Resp 14   Ht 1.854 m (6' 1\")   Wt 99.8 kg (220 lb)   SpO2 97%   BMI 29.03 kg/m²      Physical Exam  Constitutional:       General: He is not in acute distress.     Appearance: Normal appearance. He is not ill-appearing.   Cardiovascular:      Rate and Rhythm: Normal rate and regular rhythm.      Pulses: Normal pulses.   Pulmonary:      Effort: Pulmonary effort is normal.   Musculoskeletal: Normal range of motion.         General: Tenderness and signs of injury present. No swelling or deformity.   Skin:     General: Skin is warm and dry.      Capillary Refill: Capillary refill takes less than 2 seconds.      Findings: No bruising or erythema.   Neurological:      General: No focal deficit present.      Mental Status: He is alert and oriented to person, place, and time.      Cranial Nerves: No cranial nerve deficit.      Sensory: No sensory deficit.      Motor: No " weakness.      Gait: Gait normal.   Psychiatric:         Mood and Affect: Mood normal.         Behavior: Behavior normal.         Right wrist: No obvious deformities or discolorations noted. Neg significant soft tissue tenderness, deformity, swelling, or bruising seen.  Mild pain on the lateral right wrist with TTP.  Mild intermittent pain with passive flexion otherwise range of motion normal.   strength 5/5.  Distal neurovascular intact.  No snuffbox tenderness. Tinel's, Phalen's, and Finkelstein are negative. FROM.       Assessment/Plan:        1. Sprain of right wrist, subsequent encounter    - MR-WRIST W/O RIGHT; Future  - REFERRAL TO RADIOLOGY  - REFERRAL TO PHYSICAL THERAPY    Follow-up in 2 weeks   Restricted duty   Consider physiatry referral if symptoms persist   MRI ordered   Continue with Physical therapy sessions as scheduled, additional sessions ordered   Continue with OTC Tylenol/Ibuprofen as needed   Continue with ice and heat as needed   Gentle range of motion and stretching exercises as tolerated

## 2020-11-10 ENCOUNTER — NON-PROVIDER VISIT (OUTPATIENT)
Dept: MEDICAL GROUP | Facility: MEDICAL CENTER | Age: 45
End: 2020-11-10

## 2020-11-10 DIAGNOSIS — Z23 NEED FOR VACCINATION: ICD-10-CM

## 2020-11-10 PROCEDURE — 90471 IMMUNIZATION ADMIN: CPT | Performed by: INTERNAL MEDICINE

## 2020-11-10 PROCEDURE — 90686 IIV4 VACC NO PRSV 0.5 ML IM: CPT | Performed by: INTERNAL MEDICINE

## 2020-11-10 NOTE — NON-PROVIDER
"Gurmeet Mercer is a 45 y.o. male here for a non-provider visit for:   FLU    Reason for immunization: Annual Flu Vaccine  Immunization records indicate need for vaccine: Yes, confirmed with Epic  Minimum interval has been met for this vaccine: Yes  ABN completed: No    Order and dose verified by: [facility]  VIS Dated  8/15/19 was given to patient: Yes  All IAC Questionnaire questions were answered \"No.\"    Patient tolerated injection and no adverse effects were observed or reported: Yes    Pt scheduled for next dose in series: No      "

## 2020-11-19 ENCOUNTER — TELEPHONE (OUTPATIENT)
Dept: PHYSICAL THERAPY | Facility: REHABILITATION | Age: 45
End: 2020-11-19

## 2020-11-19 ENCOUNTER — PHYSICAL THERAPY (OUTPATIENT)
Dept: PHYSICAL THERAPY | Facility: REHABILITATION | Age: 45
End: 2020-11-19
Attending: NURSE PRACTITIONER
Payer: COMMERCIAL

## 2020-11-19 ENCOUNTER — HOSPITAL ENCOUNTER (OUTPATIENT)
Dept: RADIOLOGY | Facility: MEDICAL CENTER | Age: 45
End: 2020-11-19
Attending: NURSE PRACTITIONER
Payer: COMMERCIAL

## 2020-11-19 DIAGNOSIS — S63.501D SPRAIN OF RIGHT WRIST, SUBSEQUENT ENCOUNTER: ICD-10-CM

## 2020-11-19 DIAGNOSIS — S63.501D SPRAIN OF WRIST, RIGHT, SUBSEQUENT ENCOUNTER: ICD-10-CM

## 2020-11-19 PROCEDURE — 73221 MRI JOINT UPR EXTREM W/O DYE: CPT | Mod: RT

## 2020-11-19 PROCEDURE — 97162 PT EVAL MOD COMPLEX 30 MIN: CPT

## 2020-11-19 PROCEDURE — 97140 MANUAL THERAPY 1/> REGIONS: CPT

## 2020-11-19 ASSESSMENT — ENCOUNTER SYMPTOMS
PAIN SCALE: 1
PAIN SCALE AT LOWEST: 0
QUALITY: ACHING
PAIN SCALE AT HIGHEST: 2
QUALITY: BURNING

## 2020-11-19 NOTE — OP THERAPY EVALUATION
"  Outpatient Physical Therapy  INITIAL EVALUATION    [facility] Physical Therapy [address].  [address]  [facility] [address]  Phone:  [phone]  Fax:  [phone]    Date of Evaluation: 11/19/2020    Patient: Gurmeet Mercer  YOB: 1975  MRN: [id number]     Referring Provider: MAURO Garrido  [address] [place] [id number]   Referring Diagnosis Sprain of right wrist, subsequent encounter [S63.501D]     Time Calculation    Start time: 0801  Stop time: 0900 Time Calculation (min): 59 minutes         Chief Complaint: Wrist Problem    Visit Diagnoses     ICD-10-CM   1. Sprain of wrist, right, subsequent encounter  S63.501D         Subjective:   History of Present Illness:     Date of onset:  7/17/2020    Mechanism of injury:  Patient is a 44 year old male with a PMH including: Plantar fasciitis, tension HA's, ED, regractive amblyopia, visual changes. No surgical hx on file.    Copied from previous eval 9/21/20:  Pt presents to therapy with complaints of wrist pain after sustaining work related injury on 7/23/20 while helping to move an overweight patient down to the floor after pt reporting knee buckling. 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 Reports has a wrist brace and utilizes this intermittently for work but occasionally has increased pain using brace with computer work especially with wrist pronation/supination. Denies n/t and changes in strength. Reports intermittent popping/clicking.     Pt presenting to therapy today and reports \"it feels like where we left off.\" Pt reporting \"minimal hindrance\" at work but reports is concerned that he is still having symptoms intermittently. Endorses symptoms with mouse work, wrist movements such as extension and deviation ulnar>radial, pushing off from chair, popping/clicking with wrist " "movements, and pain at end of work week (works 3 x12 hour shifts). Reports will be on light duty next month and completing COVID orders on a computer.             Prior level of function:  Full duty MRI tech at [employer]  Pain:     Current pain ratin    At best pain ratin    At worst pain ratin    Location:  Medial dorsal aspect of R wrist near ulnar head, occasionally radiating into mid R forearm    Quality:  Burning and aching (one occasiona burning/stabbing)    Progression:  Improving    Pain Comments::  Aggravating: lifting milk jug (no longer problematic), using mouse (wrist extension and deviation ulnar>radial) (pronation/supination previously painful, no longer problematic), washing back (occasionally \"tight\" but improved from before), end of work week (3x 12 hour shifts)    Relieving: rest, Ibuprofen (initially only), pulling the wrist  Hand dominance:  Right  Diagnostic Tests:     Diagnostic Tests Comments:  X ray wrist 20:  FINDINGS:  There is no evidence of fracture or dislocation. Alignment is maintained. No periosteal new bone formation or osseous erosion is identified. There appears to be osseous fusion between the capitate and hamate.        IMPRESSION:     No evidence of acute fracture or dislocation.    MRI of wrist ordered, not yet completed  Treatments:     Treatment Comments:  Ibuprofen  Wrist brace - completely weaned off   Activities of Daily Living:     Patient reported ADL status: Patient's current daily routine includes:  Work: currently working as an MRI technician for [employer]; working light duty; 3 x12 hour shifts.     Current light duty: Computer work, set up for imaging, moving equipment (currently 25# restrictions)    Full duty requirements: Patient transfers, lifting/pulling/pushing 75# (per pt is in official job description), moving equipment 10-15#    Restrictions: No lifting >25#            Patient Goals:     Patient goals for therapy:  Return to work    Other " patient goals:  Get R wrist back to normal       Past Medical History:   Diagnosis Date   • Plantar fasciitis 6/8/2009   • Tension headache 6/5/2009     No past surgical history on file.  Social History     Tobacco Use   • Smoking status: Former Smoker   • Smokeless tobacco: Never Used   Substance Use Topics   • Alcohol use: Not Currently     Family and Occupational History     Socioeconomic History   • Marital status:      Spouse name: Not on file   • Number of children: Not on file   • Years of education: Not on file   • Highest education level: Not on file   Occupational History   • Not on file       Objective     Observations     Additional Observation Details  Posture: Poor seated; kyphosis; forward head and rounded shoulders    No discoloration, erythema, swelling or lesion noted at R wrist/forearm    Neurological Testing     Sensation     Wrist/Hand   Left   Intact: light touch    Right   Intact: light touch    Palpation     Additional Palpation Details  No tenderness at common extensor and flexor bundles, no tenderness at ulnar head, no tenderness along ulnar shaft    Dysfunctional tissue noted near ulnar head with graston tool    Tenderness     Right Elbow   No tenderness in the common extensor tendon, lateral epicondyle and medial epicondyle.     Right Wrist/Hand   Tenderness in the triangular fibrocartilage complex . No tenderness in the common extensor tendon, distal radioulnar joint, lateral epicondyle and medial epicondyle.     Additional Tenderness Details  No tenderness with carpal mobilization grade III        Active Range of Motion     Additional Active Range of Motion Details  Neck AROM:  Neck AROM WFL and painless (no reproduction of wrist s/s)    Shoulder AROM:  B Shoulder AROM WFL     Elbow AROM:   B Elbow flex/ext WFL   Limited pronation/supination Bilaterally  R supination limited (no longer reproductive of pt's pain)  R elbow flexion: 140 deg  R elbow extension: WFL    Wrist AROM:  R  wrist AROM:  75 extension *Reproduces some discomfort, end range pain  90 flexion no pain   42 Rad deviation   50 Ulnar deviation *reproduces some discomfort     Wrist PROM:  Pain reproduced with extension R, ulnar>radial deviation      Strength:      Additional Strength Details  Wrist isometrics: (Tested with 0 deg GH abd and elbow 90 deg flexed)    Wrist flexion: Strong and painless  Wrist extension: Strong and slight onset pain  Wrist ulnar deviation: Strong   Wrist radial deviation: Strong      strength (Measured in lbs): From previous evaluation 9/21/20  L: 80, 90, 80#  R: 80, 100,100#  *not reassessed today as pt reporting no change in  strength noted    Tests     Right Wrist/Hand   Negative TFCC load.     Additional Tests Details  Pt reporting FCC load and shear test not symptomatic today        Therapeutic Exercises (CPT 90293):     1. Verbal review of hep, pt reporting semi compliant with hep    2. Wrist eccentrics with 2# dumbbell, x5 , reviewed; pt performing incorrectly    3. Pt education, re: utilizing a wrist support with computer work     Therapeutic Treatments and Modalities:     1. Manual Therapy (CPT 92790), see below, x10 min    2. E Stim Unattended (CPT 60066), Russian continuous to R wrist and R common extensors with mhp , x15 min    Therapeutic Treatment and Modalities Summary: IASTM to medial wrist, plantar aspect of forearm including wrist extensors and common extensor bundle (dysfunctional tissue noted at medial R wrist near ulnar head), followed by cupping (4 cups applied, 1 lateral to ulnar head, one at plantar aspect of wrist, one proximal to ulnar head and one at common extensor bundle R)//increased erythema noted at plantar wrist and common extensor bundle following removal    Time-based treatments/modalities:    Physical Therapy Timed Treatment Charges  Manual therapy minutes (CPT 47768): 10 minutes  Therapeutic exercise minutes (CPT 70097): 6 minutes      Assessment, Response  and Plan:   Impairments: abnormal or restricted ROM, activity intolerance, difficulty performing job, lacks appropriate home exercise program, limited ADL's and pain with function    Assessment details:  Pt is a pleasant 44 year old male presents to therapy with complaints of wrist pain after sustaining work related injury on 7/23/20 while helping to move an overweight patient down to the floor after pt reporting knee buckling. 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 No longer using a wrist brace. Pt reports continuing to have intermittent pain at work, specifically with push off from chair, wrist ext and ulnar>radial deviation. Difficulties with computer work but improving. Denies n/t and changes in strength. Reports intermittent popping/clicking. MRI has been ordered a few weeks ago but not yet completed. Pt reports he has not received a call to complete this, encouraged pt to contact imaging center to schedule appt.     Re-evaluated pt today. Neck and shoulder exam still WFL. Continued limitations in pronation/supination observed Bilat elbows; no longer symptomatic. Able to reproduce pt's pain with AROM/PROM wrist extension and ulnar>radial deviations. TFCC is TTP but neg TFCC load and shear test on today's exam. Additional findings include dysfunctional tissue along medial aspect of wrist near ulnar head. Exam findings likely consistent with TFCC irritation. Pt may benefit from continued attempts skilled physical therapy in order to address above impairmentsin order to improve QOL, ADL's and return to reported work without restrictions.    Light duty Restrictions: No lifting >25#  Barriers to therapy:  None  Goals:   Short Term Goals:   1. Pt will be independent with written HEP.  2. Pt will report compliance to hep incr by 50%.   3. Pt will be able to perform wrist AROM without  increase in symptoms.  4. Pt will obtain a wrist guard for computer work.  5. Pt will complete wrist MRI  Short term goal time span:  2-4 weeks      Long Term Goals:    1. Pt will be independent with written HEP.  2. Pt will have a significant improvement in UEFI score (last eval 58/80)  3. Pt will be able to lift 25# consistently using safe lifting mechanics without increase in s/s.  4. Pt will be able to return to work without restrictions.    Long term goal time span:  6-8 weeks    Plan:   Therapy options:  Physical therapy treatment to continue  Planned therapy interventions:  E Stim Unattended (CPT 25447), Manual Therapy (CPT 31567), Neuromuscular Re-education (CPT 23534), Therapeutic Exercise (CPT 18047) and Therapeutic Activities (CPT 04616)  Frequency: 1-2x/wk.  Duration in visits:  4  Discussed with:  Patient  Plan details:  UPOC: 12/18/20        Functional Assessment Used        Referring provider co-signature:  I have reviewed this plan of care and my co-signature certifies the need for services.    Certification Period: 11/19/2020 to  12/18/20    Physician Signature: ________________________________ Date: ______________

## 2020-11-19 NOTE — OP THERAPY DISCHARGE SUMMARY
Outpatient Physical Therapy  DISCHARGE SUMMARY NOTE      [facility] Therapy [address].  [address]  [place] [address]  Phone:  [phone]  Fax:  [phone]    Date of Visit: 11/19/2020    Patient: Gurmeet Mercer  YOB: 1975  MRN: [id number]     Referring Provider: No referring provider defined for this encounter.   Referring Diagnosis No admission diagnoses are documented for this encounter.         Functional Assessment Used        Your patient is being discharged from Physical Therapy with the following comments:   · New Referral from different doctor    Comments:  Pt has new referral on file from alternate doctor. Will close this episode of care and open new referral with new evaluation to address wrist secondary to workers comp injury.       Recommendations:  DC current episode and open new referral for new MD Darin Castillo, PT    Date: 11/19/2020

## 2020-11-19 NOTE — OP THERAPY DAILY TREATMENT
Outpatient Physical Therapy  DAILY TREATMENT     [facility] Physical [address].  [address]  [place] [address]  Phone:  [phone]  Fax:  [phone]    Date: 11/19/2020    Patient: Gurmeet Mercer  YOB: 1975  MRN: [id number]     Time Calculation                   Chief Complaint: No chief complaint on file.    Visit #: 5    SUBJECTIVE:    Aggravating: lifting milk jug (initially, less now), using mouse (prontation/supination), washing back, end of work week (3x 12 hour shifts)    OBJECTIVE:  Current objective measures:       Additional Active Range of Motion Details  Neck AROM:  Neck AROM WFL     Shoulder AROM:  B Shoulder AROM WFL      Elbow AROM:   B Elbow flex/ext WFL   Limited pronation/supination Bilaterally  R supination limited (reproductive of pt's pain)     Wrist AROM:  R wrist AROM:  80; end range wrist extension *Reproduces discomfort and s/s  90 flexion no pain   25 Rad deviation  50 Ulnar deviation         Wrist PROM:  Pain reproduced with radial deviation and extension R         Strength:       Additional Strength Details  Wrist isometrics: (Tested with 0 deg GH abd and elbow 90 deg flexed)     Wrist flexion: Strong and painless  Wrist extension: Strong and painful  Wrist ulnar deviation: Strong and painful  Wrist radial deviation: Strong and painful     TTP: Common extensor bundle R     Goals:   Short Term Goals:   Goals:   Short Term Goals:   1. Pt will be independent with written HEP. (Partially met)  2. Pt will be able to perform elbow and wrist AROM without increase in symptoms. (Not met)           Short term goal time span:  2-4 weeks      Long Term Goals:    Long Term Goals:    1. Pt will be independent with written HEP. (Partially met)  2. Pt will have a significant improvement in UEFI score (eval 58/80) (Not met; MDC is 9 pts)  3. Pt will be able to lift 10# consistently using safe lifting mechanics without increase in s/s. (Partially  met)  4. Pt will be able to return to work without restrictions. (Not met)           Long term goal time span:  4-6 weeks      Therapeutic Exercises (CPT 72930):     1. Verbal review of hep, x2 min, reviewed with visual aides    2. Regressed wrist extension eccentrics>no weight (manual resistance to tolerance only)    11. UPOC: 11/20/20      Therapeutic Exercise Summary:       Therapeutic Treatments and Modalities:     1. Manual Therapy (CPT 76141), see below    Therapeutic Treatment and Modalities Summary: KinesioTaping to wrist extensors along length of forearm with wrist pre-positioned in wrist extension followed by tape perpendicular to common extensor with 50% stretch.        Time-based treatments/modalities:           Pain rating (1-10) before treatment:  {PAIN NUMBERS_1-10:42152}  Pain rating (1-10) after treatment:  {PAIN NUMBERS_1-10:38976}    ASSESSMENT:   Response to treatment: Pt seen for 4 visits of skilled physical therapy with moderate improvement. Has 2 remaining visits approved for workers comp. Last seen 10/20/20, gap in care due to lag in auth time. Pt presenting today with ***  Pt currently scheduled for wrist MRI.    PLAN/RECOMMENDATIONS:   Plan for treatment: therapy treatment to continue next visit.  Planned interventions for next visit: continue with current treatment.

## 2020-11-30 ENCOUNTER — APPOINTMENT (OUTPATIENT)
Dept: PHYSICAL THERAPY | Facility: REHABILITATION | Age: 45
End: 2020-11-30
Attending: NURSE PRACTITIONER
Payer: COMMERCIAL

## 2020-12-01 ENCOUNTER — APPOINTMENT (OUTPATIENT)
Dept: OCCUPATIONAL MEDICINE | Facility: CLINIC | Age: 45
End: 2020-12-01

## 2020-12-01 ENCOUNTER — PHYSICAL THERAPY (OUTPATIENT)
Dept: PHYSICAL THERAPY | Facility: REHABILITATION | Age: 45
End: 2020-12-01
Attending: NURSE PRACTITIONER
Payer: COMMERCIAL

## 2020-12-01 DIAGNOSIS — S66.911D STRAIN OF HAND, RIGHT, SUBSEQUENT ENCOUNTER: ICD-10-CM

## 2020-12-01 DIAGNOSIS — S63.501D SPRAIN OF WRIST, RIGHT, SUBSEQUENT ENCOUNTER: ICD-10-CM

## 2020-12-01 PROCEDURE — 97014 ELECTRIC STIMULATION THERAPY: CPT

## 2020-12-01 PROCEDURE — 97140 MANUAL THERAPY 1/> REGIONS: CPT

## 2020-12-01 PROCEDURE — 97110 THERAPEUTIC EXERCISES: CPT

## 2020-12-01 NOTE — OP THERAPY DAILY TREATMENT
Outpatient Physical Therapy  DAILY TREATMENT     [facility] Physical [address].  [address]  [place] [address]  Phone:  [phone]  Fax:  [phone]    Date: 12/01/2020    Patient: Gurmeet Mercer  YOB: 1975  MRN: [id number]     Time Calculation    Start time: 0833  Stop time: 0915 Time Calculation (min): 42 minutes         Chief Complaint: Wrist Problem    Visit #: 2    SUBJECTIVE:  I've been working on the exercises. I feel more or less the same. I used a power drill and it really seemed to flare up my wrist.      OBJECTIVE:  Current objective measures:       MRI R wrist 11/19/20:     IMPRESSION:     1.  Dorsal radioulnar strut and proximal triangular fibrocartilage tears with associated ganglion cyst extending under the extensor carpi ulnaris     2.  Partial scapholunate ligament tear along the volar surface and there is an 11 mm volar radial ganglion cyst     3.  Small distal radioulnar joint effusion    Therapeutic Exercises (CPT 48624):     1. Verbal review of hep, pt reporting semi compliant with hep    2. Wrist eccentrics with 2# dumbbell, reviewed    3. Pt education, re: utilizing a wrist support with computer work , reviewed     4. Pt education, review of wrist anatomy using visual aides    Therapeutic Treatments and Modalities:     1. Manual Therapy (CPT 63221), see below, x8 min    2. E Stim Unattended (CPT 92714), Russian continuous to R wrist and R common extensors with mhp , x15 min    Therapeutic Treatment and Modalities Summary: IASTM to medial wrist, plantar aspect of forearm including wrist extensors and common extensor bundle   followed by cupping (1 cup applied, one at common extensor bundle R with active wrist extension)//increased erythema noted at plantar wrist and common extensor bundle following removal)    (Pt reporting burning and pain in forearm following IASTM today)    Time-based treatments/modalities:    Physical Therapy Timed  Treatment Charges  Manual therapy minutes (CPT 86598): 8 minutes  Therapeutic exercise minutes (CPT 82392): 19 minutes      Pain rating (1-10) before treatment:  0  Pain rating (1-10) after treatment:  0    ASSESSMENT:   Response to treatment:   Recent MRI of wrist (see obj above); confirmed TFCC involvement in addition to partial tears and ganglion cyst.     Pt reporting continued pain and reporting overall plateau. No significant improvement with therex and continuing to have aggravation of wrist with work and daily life activities. Reviewed use of pad/wrist support to avoid prolonged wrist extension as pt will be starting light duty work with increased duration using mouse and computer. Pt has appt with [facility] tomorrow and will discuss further or concurrent treatment options. If continued plateau and no progress at next session will refer back to MD.      PLAN/RECOMMENDATIONS:   Plan for treatment: therapy treatment to continue next visit.  Planned interventions for next visit: continue with current treatment. Review [facility] appt

## 2020-12-02 ENCOUNTER — OCCUPATIONAL MEDICINE (OUTPATIENT)
Dept: OCCUPATIONAL MEDICINE | Facility: CLINIC | Age: 45
End: 2020-12-02

## 2020-12-02 VITALS
BODY MASS INDEX: 29.82 KG/M2 | SYSTOLIC BLOOD PRESSURE: 128 MMHG | WEIGHT: 225 LBS | OXYGEN SATURATION: 95 % | HEART RATE: 85 BPM | TEMPERATURE: 97.8 F | RESPIRATION RATE: 14 BRPM | HEIGHT: 73 IN | DIASTOLIC BLOOD PRESSURE: 82 MMHG

## 2020-12-02 DIAGNOSIS — S63.501D SPRAIN OF RIGHT WRIST, SUBSEQUENT ENCOUNTER: ICD-10-CM

## 2020-12-02 DIAGNOSIS — M67.441 GANGLION CYST OF TENDON SHEATH OF RIGHT HAND: ICD-10-CM

## 2020-12-02 DIAGNOSIS — S63.591D: ICD-10-CM

## 2020-12-02 PROCEDURE — 99213 OFFICE O/P EST LOW 20 MIN: CPT | Performed by: NURSE PRACTITIONER

## 2020-12-02 ASSESSMENT — FIBROSIS 4 INDEX: FIB4 SCORE: .9

## 2020-12-02 ASSESSMENT — PAIN SCALES - GENERAL: PAINLEVEL: 1=MINIMAL PAIN

## 2020-12-02 ASSESSMENT — ENCOUNTER SYMPTOMS
MYALGIAS: 1
RESPIRATORY NEGATIVE: 1
CARDIOVASCULAR NEGATIVE: 1
PSYCHIATRIC NEGATIVE: 1
CONSTITUTIONAL NEGATIVE: 1
NEUROLOGICAL NEGATIVE: 1

## 2020-12-02 NOTE — LETTER
[address],   [address] [place] [address]  Phone:  [phone] - Fax:  [phone]   [facility] Progress Report and Disability Certification  Date of Service: 12/2/2020   No Show:  No  Date / Time of Next Visit:  Discharge/care transfer to hand surgery   Claim Information   Patient Name: Gurmeet Mercer  Claim Number:     Employer: [employer]  Date of Injury: 7/17/2020     Insurer / TPA: [employer]  ID / SSN:     Occupation: MRI TECH  Diagnosis: Diagnoses of Sprain of right wrist, subsequent encounter, Triangular fibrocartilage complex tear, right, subsequent encounter, and Ganglion cyst of tendon sheath of right hand were pertinent to this visit.    Medical Information   Related to Industrial Injury? Yes    Subjective Complaints:  DOI: 7/17/20.  Right wrist pain.  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  Her started to have gradual improvement but now it has since plateaued.  Pain is very intermittent, waxing and waning and triggered with certain movements.  He does not have any numbness, tingling, or overall weakness.  He has not had to take anything for pain.  Patient states he is attending physical therapy with mild improvement.  He has been tolerating light duty without difficulty.  MRI results reviewed with patient.  Plan of care discussed with patient.   Objective Findings: Right wrist: No obvious deformities or discolorations noted. Neg significant soft tissue tenderness, deformity, swelling, or bruising seen.  Mild pain on the lateral right wrist with TTP.  Mild intermittent pain with passive flexion otherwise range of motion normal.   strength 5/5.  Distal neurovascular intact.  No snuffbox tenderness. Tinel's, Phalen's, and Finkelstein are negative. FROM.     MRI 11/19/20:   IMPRESSION:     1.  Dorsal radioulnar strut and proximal triangular  fibrocartilage tears with associated ganglion cyst extending under the extensor carpi ulnaris     2.  Partial scapholunate ligament tear along the volar surface and there is an 11 mm volar radial ganglion cyst     3.  Small distal radioulnar joint effusion   Pre-Existing Condition(s):     Assessment:   Condition Improved    Status: Discharged / Care Transfer  Permanent Disability:No    Plan: PTTransfer Care    Diagnostics:      Comments:  Follow-up in 3 weeks, if not seen by hand surgery  Restricted duty per hand surgery  Hand surgery referral placed, transfer care  Continue with physical therapy appointments as scheduled  OTC Tylenol/ibuprofen as needed for symptoms  Apply heat and i  ce as needed for symptoms  Continue with gentle range of motion and stretching exercises as tolerated    Disability Information   Status: Released to Restricted Duty    From:  12/2/2020  Through:   Restrictions are: Temporary   Physical Restrictions   Sitting:    Standing:    Stooping:    Bending:      Squatting:    Walking:    Climbing:    Pushing:      Pulling:    Other:    Reaching Above Shoulder (L):   Reaching Above Shoulder (R):       Reaching Below Shoulder (L):    Reaching Below Shoulder (R):      Not to exceed Weight Limits   Carrying(hrs):   Weight Limit(lb): < or = to 25 pounds  Comments:Right wrist/hand only Lifting(hrs):   Weight  Limit(lb): < or = to 25 pounds  Comments:Right wrist/hand only   Comments: Avoid transferring patients    Repetitive Actions   Hands: i.e. Fine Manipulations from Grasping:     Feet: i.e. Operating Foot Controls:     Driving / Operate Machinery:     Provider Name:   MAURO Garrido Physician Signature:  Physician Name:     Clinic Name / Location: [address],   [place] [address]  [place] [address] Clinic Phone Number: Dept: [phone]   Appointment Time: 2:15 Pm Visit Start Time: 2:28 PM   Check-In Time:  2:16 Pm Visit Discharge Time: 2:52 pm       Original-Treating Physician or Chiropractor    Page 2-Insurer/TPA    Page 3-Employer    Page 4-Employee

## 2020-12-02 NOTE — PROGRESS NOTES
"Subjective:      Gurmeet Mercer is a 45 y.o. male who presents with Follow-Up (WC DOI: 7/17/2020 right wrist  same - RM 16)      DOI: 7/17/20.  Right wrist pain.  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  Her started to have gradual improvement but now it has since plateaued.  Pain is very intermittent, waxing and waning and triggered with certain movements.  He does not have any numbness, tingling, or overall weakness.  He has not had to take anything for pain.  Patient states he is attending physical therapy with mild improvement.  He has been tolerating light duty without difficulty.  MRI results reviewed with patient.  Plan of care discussed with patient.     HPI    Review of Systems   Constitutional: Negative.    Respiratory: Negative.    Cardiovascular: Negative.    Musculoskeletal: Positive for joint pain and myalgias.   Skin: Negative.    Neurological: Negative.    Psychiatric/Behavioral: Negative.         SOCHX: Works as a MRI Tech at [employer].   FH: No pertinent family history to this problem.   Objective:     /82   Pulse 85   Temp 36.6 °C (97.8 °F) (Temporal)   Resp 14   Ht 1.854 m (6' 1\")   Wt 102.1 kg (225 lb)   SpO2 95%   BMI 29.69 kg/m²      Physical Exam  Constitutional:       General: He is not in acute distress.     Appearance: Normal appearance. He is not ill-appearing.   Cardiovascular:      Rate and Rhythm: Normal rate and regular rhythm.      Pulses: Normal pulses.   Pulmonary:      Effort: Pulmonary effort is normal.   Musculoskeletal: Normal range of motion.         General: Tenderness and signs of injury present. No swelling or deformity.   Skin:     General: Skin is warm and dry.      Capillary Refill: Capillary refill takes less than 2 seconds.      Findings: No bruising or erythema.   Neurological:      General: No focal deficit present.      Mental Status: He is alert and oriented to person, " place, and time.      Cranial Nerves: No cranial nerve deficit.      Sensory: No sensory deficit.      Motor: No weakness.      Gait: Gait normal.      Deep Tendon Reflexes: Reflexes normal.   Psychiatric:         Mood and Affect: Mood normal.         Behavior: Behavior normal.         Right wrist: No obvious deformities or discolorations noted. Neg significant soft tissue tenderness, deformity, swelling, or bruising seen.  Mild pain on the lateral right wrist with TTP.  Mild intermittent pain with passive flexion otherwise range of motion normal.   strength 5/5.  Distal neurovascular intact.  No snuffbox tenderness. Tinel's, Phalen's, and Finkelstein are negative. FROM.     MRI 11/19/20:   IMPRESSION:     1.  Dorsal radioulnar strut and proximal triangular fibrocartilage tears with associated ganglion cyst extending under the extensor carpi ulnaris     2.  Partial scapholunate ligament tear along the volar surface and there is an 11 mm volar radial ganglion cyst     3.  Small distal radioulnar joint effusion       Assessment/Plan:        1. Sprain of right wrist, subsequent encounter    - REFERRAL TO HAND SURGERY    2. Triangular fibrocartilage complex tear, right, subsequent encounter    - REFERRAL TO HAND SURGERY    3. Ganglion cyst of tendon sheath of right hand    - REFERRAL TO HAND SURGERY    Follow-up in 3 weeks, if not seen by hand surgery   Restricted duty per hand surgery   Hand surgery referral placed, transfer care   Continue with physical therapy appointments as scheduled   OTC Tylenol/ibuprofen as needed for symptoms   Apply heat and ice as needed for symptoms   Continue with gentle range of motion and stretching exercises as tolerated

## 2020-12-03 ENCOUNTER — TELEPHONE (OUTPATIENT)
Dept: PHYSICAL THERAPY | Facility: REHABILITATION | Age: 45
End: 2020-12-03

## 2020-12-17 ENCOUNTER — PHYSICAL THERAPY (OUTPATIENT)
Dept: PHYSICAL THERAPY | Facility: REHABILITATION | Age: 45
End: 2020-12-17
Attending: NURSE PRACTITIONER
Payer: COMMERCIAL

## 2020-12-17 DIAGNOSIS — S66.911D STRAIN OF HAND, RIGHT, SUBSEQUENT ENCOUNTER: ICD-10-CM

## 2020-12-17 DIAGNOSIS — S63.501D SPRAIN OF WRIST, RIGHT, SUBSEQUENT ENCOUNTER: ICD-10-CM

## 2020-12-17 PROCEDURE — 97110 THERAPEUTIC EXERCISES: CPT

## 2020-12-17 PROCEDURE — 97014 ELECTRIC STIMULATION THERAPY: CPT

## 2020-12-17 NOTE — OP THERAPY DISCHARGE SUMMARY
Outpatient Physical Therapy  DISCHARGE SUMMARY NOTE      [facility] [address].  [address]  [facility] [address]  Phone:  [phone]  Fax:  [phone]    Date of Visit: 12/17/2020    Patient: Gurmeet Mercer  YOB: 1975  MRN: [id number]     Referring Provider: MAURO Garrido  [address] [place] [address],  [place] [address]   Referring Diagnosis Sprain of right wrist, subsequent encounter [T98.393M]         Functional Assessment Used  PT Functional Assessment Tool Used: THE UPPER EXTREMITY FUNCTIONAL INDEX (UEFI)  PT Functional Assessment Score: 64/80     Your patient is being discharged from Physical Therapy with the following comments:   · Goals partially met    Comments:     Pt presents to therapy with complaints of wrist pain after sustaining work related injury on 7/23/20 while helping to move an overweight patient down to the floor after pt reporting knee buckling. 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 Endorses symptoms with mouse work, wrist movements such as extension and deviation ulnar>radial, pushing off from chair, popping/clicking with wrist movements, and pain at end of work week (works 3 x12 hour shifts).     Pt has been seen for 7 sessions of skilled physical therapy with min improvement. Pt reporting continued pain and reporting overall plateau. No significant improvement with therex and continuing to have aggravation of wrist with work and daily life activities. Interventions attempted include: Manual therapy, modalities, eccentric and stabilization training for wrist, postural re-education, ergonomics modifications. Due to no longer making progress with physical therapy, will DC today with indep hep.     Of note, pt has appointment with hand surgery for further assessment and treatment.    Goals:   Short Term Goals:   1. Pt will be independent with written HEP.  (partially met)  2. Pt will report compliance to hep incr by 50%. (partially met)  3. Pt will be able to perform wrist AROM without increase in symptoms. (Partially met)  4. Pt will obtain a wrist guard for computer work. (Met)  5. Pt will complete wrist MRI (Met)  Short term goal time span:  2-4 weeks      Long Term Goals:    1. Pt will be independent with written HEP.  2. Pt will have a significant improvement in UEFI score (last eval 58/80) (Partially met)  3. Pt will be able to lift 25# consistently using safe lifting mechanics without increase in s/s. (Partially met)  4. Pt will be able to return to work without restrictions. (Not met)     Long term goal time span:  6-8 weeks    Recommendations:  Pt has partially met listed goals and has reached plateau. Will DC today with indep hep. Pt has referral to Hand surgery for further assessment/treatment. All questions answered and verbally reviewed independent hep with pt. Expressed to pt that he can return to skilled physical therapy if condition should change or worsen in future.    Darin Castillo, PT    Date: 12/17/2020

## 2020-12-17 NOTE — OP THERAPY DAILY TREATMENT
Outpatient Physical Therapy  DAILY TREATMENT     [address]  [address]  [place] [address]  Phone:  [phone]  Fax:  [phone]    Date: 12/17/2020    Patient: Gurmeet Mercer  YOB: 1975  MRN: [id number]     Time Calculation    Start time: 0805  Stop time: 0845 Time Calculation (min): 40 minutes         Chief Complaint: Wrist Problem    Visit #: 3    SUBJECTIVE:  I feel about the same; I have an appointment with Dr. Hua for my wrist. I think he's following me afterwards.         OBJECTIVE:  Current objective measures:       Therapeutic Exercises (CPT 00126):     1. Verbal review of hep, pt reporting semi compliant with hep    2. Radial deviation eccentrics with 3>5# dumbbell, 15x ea, hep    3. Rubber band finger extension eccentrics, 15x, hep    4. Goals assessment    5. Objective measures      Therapeutic Exercise Summary: Pt performed these exercises with instruction and SPV.  Provided handout with these exercises for daily HEP.      Therapeutic Treatments and Modalities:     2. E Stim Unattended (CPT 71761), Russian continuous to R wrist and R common extensors with mhp , x15 min    Time-based treatments/modalities:    Physical Therapy Timed Treatment Charges  Therapeutic exercise minutes (CPT 87973): 25 minutes      Pain rating (1-10) before treatment:  0  Pain rating (1-10) after treatment:  0    ASSESSMENT:   Response to treatment:   See DC note    PLAN/RECOMMENDATIONS:   Plan for treatment: See DC note

## 2020-12-20 DIAGNOSIS — Z23 NEED FOR VACCINATION: ICD-10-CM

## 2020-12-20 PROCEDURE — 91300 PFIZER SARS-COV-2 VACCINE: CPT

## 2020-12-20 PROCEDURE — 0001A PFIZER SARS-COV-2 VACCINE: CPT

## 2020-12-21 ENCOUNTER — IMMUNIZATION (OUTPATIENT)
Dept: FAMILY PLANNING/WOMEN'S HEALTH CLINIC | Facility: IMMUNIZATION CENTER | Age: 45
End: 2020-12-21
Payer: COMMERCIAL

## 2020-12-21 DIAGNOSIS — Z23 ENCOUNTER FOR VACCINATION: Primary | ICD-10-CM

## 2021-01-10 ENCOUNTER — IMMUNIZATION (OUTPATIENT)
Dept: FAMILY PLANNING/WOMEN'S HEALTH CLINIC | Facility: IMMUNIZATION CENTER | Age: 46
End: 2021-01-10
Attending: FAMILY MEDICINE
Payer: COMMERCIAL

## 2021-01-10 DIAGNOSIS — Z23 ENCOUNTER FOR VACCINATION: Primary | ICD-10-CM

## 2021-01-10 PROCEDURE — 0002A PFIZER SARS-COV-2 VACCINE: CPT

## 2021-01-10 PROCEDURE — 91300 PFIZER SARS-COV-2 VACCINE: CPT

## 2021-03-08 ENCOUNTER — TELEPHONE (OUTPATIENT)
Dept: MEDICAL GROUP | Facility: MEDICAL CENTER | Age: 46
End: 2021-03-08

## 2021-03-09 NOTE — TELEPHONE ENCOUNTER
Please call the patient, I have completed the FMLA forms, he could pick that up at the  or we could fax that or mail that to him.

## 2021-03-15 ENCOUNTER — TELEPHONE (OUTPATIENT)
Dept: MEDICAL GROUP | Facility: MEDICAL CENTER | Age: 46
End: 2021-03-15

## 2021-03-15 PROCEDURE — RXMED WILLOW AMBULATORY MEDICATION CHARGE: Performed by: INTERNAL MEDICINE

## 2021-03-15 RX ORDER — AZITHROMYCIN 500 MG/1
TABLET, FILM COATED ORAL
Qty: 3 TABLET | Refills: 0 | Status: SHIPPED | OUTPATIENT
Start: 2021-03-15 | End: 2021-09-27

## 2021-03-17 ENCOUNTER — PHARMACY VISIT (OUTPATIENT)
Dept: PHARMACY | Facility: MEDICAL CENTER | Age: 46
End: 2021-03-17
Payer: COMMERCIAL

## 2021-05-03 ENCOUNTER — TELEMEDICINE (OUTPATIENT)
Dept: TELEHEALTH | Facility: TELEMEDICINE | Age: 46
End: 2021-05-03
Payer: COMMERCIAL

## 2021-05-03 ENCOUNTER — PHARMACY VISIT (OUTPATIENT)
Dept: PHARMACY | Facility: MEDICAL CENTER | Age: 46
End: 2021-05-03
Payer: COMMERCIAL

## 2021-05-03 DIAGNOSIS — J01.00 ACUTE NON-RECURRENT MAXILLARY SINUSITIS: ICD-10-CM

## 2021-05-03 PROCEDURE — RXMED WILLOW AMBULATORY MEDICATION CHARGE: Performed by: NURSE PRACTITIONER

## 2021-05-03 PROCEDURE — 99213 OFFICE O/P EST LOW 20 MIN: CPT | Mod: 95 | Performed by: NURSE PRACTITIONER

## 2021-05-03 RX ORDER — PSEUDOEPHEDRINE HYDROCHLORIDE 60 MG/1
60 TABLET, FILM COATED ORAL EVERY 6 HOURS PRN
Qty: 28 TABLET | Refills: 0 | Status: SHIPPED | OUTPATIENT
Start: 2021-05-03 | End: 2021-05-11

## 2021-05-03 RX ORDER — AMOXICILLIN AND CLAVULANATE POTASSIUM 875; 125 MG/1; MG/1
1 TABLET, FILM COATED ORAL 2 TIMES DAILY
Qty: 10 TABLET | Refills: 0 | Status: SHIPPED | OUTPATIENT
Start: 2021-05-03 | End: 2021-05-08

## 2021-05-03 ASSESSMENT — ENCOUNTER SYMPTOMS
SPUTUM PRODUCTION: 0
HEMOPTYSIS: 0
WHEEZING: 0
SORE THROAT: 1
FEVER: 0
COUGH: 1
SINUS PAIN: 1
CHILLS: 0
SHORTNESS OF BREATH: 0
DIAPHORESIS: 0

## 2021-05-03 NOTE — PROGRESS NOTES
Subjective:      Gurmeet Mercer is a 45 y.o. male who presents with No chief complaint on file.            [place] presents for a virtual visit.  Identification was verified.  Patient was informed that encounter would be conducted over a secure, encrypted network via [employer] and consent was obtained.  [place] presents with a 5 day history of progressively worsening URI symptoms with nasal congestion, sinus pain, sore throat, ear fullness, and cough.  Associated factors: fatigue.  Patient has tried OTC cold medicine with minimal relief.  Denies any fever, chills, or myalgias.  No chronc or recurrent sinusitis.  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            Review of Systems   Constitutional: Positive for malaise/fatigue. Negative for chills, diaphoresis and fever.   HENT: Positive for congestion, sinus pain and sore throat. Negative for ear pain.    Respiratory: Positive for cough. Negative for hemoptysis, sputum production, shortness of breath and wheezing.    Cardiovascular: Negative for chest pain.   Skin: Negative for rash.     Medications, Allergies, and current problem list reviewed today in Epic     Objective:     There were no vitals taken for this visit.     Physical Exam  Constitutional:       General: He is not in acute distress.     Appearance: Normal appearance. He is not toxic-appearing or diaphoretic.   HENT:      Nose: Congestion present. No rhinorrhea.      Comments: Nares congested but patent.  Maxillary sinus TTP.       Mouth/Throat:      Mouth: Mucous membranes are moist.      Pharynx: No oropharyngeal exudate or posterior oropharyngeal erythema.   Eyes:      General: No scleral icterus.        Right eye: No discharge.         Left eye: No discharge.      Conjunctiva/sclera: Conjunctivae normal.   Pulmonary:      Effort: Pulmonary effort is normal.      Comments: No cough during encounter.    Skin:     General: Skin is warm and dry.       Coloration: Skin is not pale.      Findings: No rash.   Neurological:      Mental Status: He is alert and oriented to person, place, and time.   Psychiatric:         Mood and Affect: Mood normal.         Behavior: Behavior normal.                 Assessment/Plan:        1. Acute non-recurrent maxillary sinusitis  - amoxicillin-clavulanate (AUGMENTIN) 875-125 MG Tab; Take 1 tablet by mouth 2 times a day for 5 days.  Dispense: 10 tablet; Refill: 0    Discussed exam findings with [place]. Differential reviewed.  Take full course of antibiotics.  OTC NSAIDs or tylenol prn pain.  OTC Sudafed prn symptom management.  Maintain adequate po hydration.  RTC in 5-7 days if symptoms persist, sooner if worse.  He verbalized understanding of and agreed with plan of care.

## 2021-05-04 ENCOUNTER — PHARMACY VISIT (OUTPATIENT)
Dept: PHARMACY | Facility: MEDICAL CENTER | Age: 46
End: 2021-05-04
Payer: COMMERCIAL

## 2021-05-05 ENCOUNTER — HOSPITAL ENCOUNTER (OUTPATIENT)
Dept: LAB | Facility: MEDICAL CENTER | Age: 46
End: 2021-05-05
Attending: EMERGENCY MEDICINE
Payer: COMMERCIAL

## 2021-05-05 LAB
COVID ORDER STATUS COVID19: NORMAL
SARS-COV-2 RNA RESP QL NAA+PROBE: NOTDETECTED
SPECIMEN SOURCE: NORMAL

## 2021-05-21 ENCOUNTER — OFFICE VISIT (OUTPATIENT)
Dept: MEDICAL GROUP | Facility: IMAGING CENTER | Age: 46
End: 2021-05-21
Payer: COMMERCIAL

## 2021-05-21 VITALS
RESPIRATION RATE: 12 BRPM | HEART RATE: 71 BPM | HEIGHT: 73 IN | TEMPERATURE: 97.7 F | OXYGEN SATURATION: 97 % | BODY MASS INDEX: 28.1 KG/M2 | DIASTOLIC BLOOD PRESSURE: 78 MMHG | WEIGHT: 212 LBS | SYSTOLIC BLOOD PRESSURE: 110 MMHG

## 2021-05-21 DIAGNOSIS — Z13.1 SCREENING FOR DIABETES MELLITUS: ICD-10-CM

## 2021-05-21 DIAGNOSIS — Z13.220 SCREENING, LIPID: ICD-10-CM

## 2021-05-21 DIAGNOSIS — R51.9 NONINTRACTABLE EPISODIC HEADACHE, UNSPECIFIED HEADACHE TYPE: ICD-10-CM

## 2021-05-21 DIAGNOSIS — H04.123 DRY EYES: ICD-10-CM

## 2021-05-21 DIAGNOSIS — K21.9 GASTROESOPHAGEAL REFLUX DISEASE, UNSPECIFIED WHETHER ESOPHAGITIS PRESENT: ICD-10-CM

## 2021-05-21 DIAGNOSIS — J30.89 ENVIRONMENTAL AND SEASONAL ALLERGIES: ICD-10-CM

## 2021-05-21 PROCEDURE — 99215 OFFICE O/P EST HI 40 MIN: CPT | Performed by: FAMILY MEDICINE

## 2021-05-21 RX ORDER — TRIAMCINOLONE ACETONIDE 55 UG/1
2 SPRAY, METERED NASAL DAILY
Qty: 16.9 ML | Refills: 3 | Status: SHIPPED | OUTPATIENT
Start: 2021-05-21 | End: 2022-08-10 | Stop reason: SDUPTHER

## 2021-05-21 ASSESSMENT — FIBROSIS 4 INDEX: FIB4 SCORE: .9

## 2021-05-21 ASSESSMENT — PAIN SCALES - GENERAL: PAINLEVEL: NO PAIN

## 2021-05-21 ASSESSMENT — PATIENT HEALTH QUESTIONNAIRE - PHQ9: CLINICAL INTERPRETATION OF PHQ2 SCORE: 0

## 2021-05-21 NOTE — PROGRESS NOTES
"    Chief Complaint   Patient presents with   • Establish Care       HPI:  45 y.o. male new patient here to review medical issues and establish care.     Headaches- can be different at times. Takes alleve with more severe headaches.  Headaches may be allergy or stress related.  Eyestrain may also cause symptoms. Work in radiology, MRI.     Dry eyes- sees joy Chicas did not work. Restasis.     Reflux issues. Improved currently. Omeprazole helped some.   Questionable findings on abdominal CT on left 2019, then saw GI and had scope evaluation.     Mother was prediabetic, diet controlled.   No family history of CVD or diabetes otherwise.     Notes he will be traveling to [place] and [place], wondering about vaccinations.     Lifestyle:  Diet: \"unhealthy\"- eats a lot of meat, BBQ. Some fish. Some veggies. Likes bread. States he loves eating, but tries.   Social Support:   Work: in radiology, MRI      Health Maintenance    Health Maintenance Summary                IMM DTaP/Tdap/Td Vaccine Next Due 9/21/2022      Done 9/21/2012 Imm Admin: Tdap Vaccine     Patient has more history with this topic...          Immunization History   Administered Date(s) Administered   • Hepatitis A Vaccine, Adult 12/18/2019   • Hepatitis A Vaccine, Ped/Adol 09/21/2012   • Hepatitis B Vaccine (Adol/Adult) 01/13/2006, 01/16/2007, 07/27/2009   • Hepatitis B Vaccine Adolescent/Pediatric 07/27/2009   • INFLUENZA TIV (IM) 11/03/2011, 11/01/2013   • IPV 12/18/2019   • Influenza Vaccine Quad Inj (Pf) 10/13/2016, 11/08/2017, 10/02/2018, 09/25/2019, 11/10/2020   • Influenza Vaccine Quad Inj (Preserved) 04/06/2015, 12/21/2015   • MMR Vaccine 12/03/2005   • Pfizer SARS-CoV-2 Vaccine 12/20/2020, 01/10/2021   • TD Vaccine 01/13/2006   • Tdap Vaccine 09/21/2012   • Tuberculin Skin Test 07/27/2009, 06/22/2011, 06/25/2012, 07/09/2012, 07/15/2013, 07/22/2013   • Typhoid Vaccine 12/18/2019   • Yellow Fever Vaccine 12/27/2013       Review of Systems "   Constitutional: Negative for fever, chills and malaise/fatigue.   HENT: Negative for congestion, sore throat, or swallowing issues.   Eyes: Negative for pain or vision changes.   Respiratory: Negative for cough and shortness of breath.    Cardiovascular: Negative for leg swelling. No chest pain.   Gastrointestinal: Negative for nausea, vomiting, abdominal pain and diarrhea.   Genitourinary: Negative for dysuria and hematuria.   Skin: Negative for rash.   Neurological: Negative for dizziness, focal weakness and headaches.   Endo/Heme/Allergies: Does not bruise/bleed easily.   Psychiatric/Behavioral: Negative for depression.  The patient is not nervous/anxious.        Current Outpatient Medications:   •  triamcinolone (NASACORT ALLERGY 24HR) 55 MCG/ACT nasal inhaler, Spray 2 Sprays in nose every day., Disp: , Rfl:   •  acetaminophen (TYLENOL) 500 MG Tab, Take 500-1,000 mg by mouth every 6 hours as needed., Disp: , Rfl:   •  Ibuprofen (ADVIL) 200 MG Cap, Take  by mouth., Disp: , Rfl:   •  azithromycin (ZITHROMAX) 500 MG tablet, Take 1 tab daily for three days as needed for traveler's diarrhea (Patient not taking: Reported on 5/21/2021), Disp: 3 tablet, Rfl: 0    Allergies   Allergen Reactions   • Sulfa Drugs        Patient Active Problem List   Diagnosis   • Family history of brain aneurysm   • Preventative health care   • Dyslipidemia   • Erectile dysfunction   • Refractive amblyopia   • Allergic rhinitis   • Nonintractable episodic headache   • Visual changes       Past Medical History:   Diagnosis Date   • Plantar fasciitis 6/8/2009   • Tension headache 6/5/2009       History reviewed. No pertinent surgical history.    Family History   Problem Relation Age of Onset   • Hyperlipidemia Mother    • Hypertension Mother    • Heart Disease Father         a.fib       Social History     Socioeconomic History   • Marital status:      Spouse name: Not on file   • Number of children: Not on file   • Years of  "education: Not on file   • Highest education level: Not on file   Occupational History   • Not on file   Tobacco Use   • Smoking status: Former Smoker     Packs/day: 1.00     Years: 10.00     Pack years: 10.00   • Smokeless tobacco: Never Used   • Tobacco comment: 2006 quit   Vaping Use   • Vaping Use: Never used   Substance and Sexual Activity   • Alcohol use: Yes     Comment: rarely   • Drug use: No   • Sexual activity: Yes   Other Topics Concern   • Not on file   Social History Narrative   • Not on file     Social Determinants of Health     Financial Resource Strain:    • Difficulty of Paying Living Expenses:    Food Insecurity:    • Worried About Running Out of Food in the Last Year:    • Ran Out of Food in the Last Year:    Transportation Needs:    • Lack of Transportation (Medical):    • Lack of Transportation (Non-Medical):    Physical Activity:    • Days of Exercise per Week:    • Minutes of Exercise per Session:    Stress:    • Feeling of Stress :    Social Connections:    • Frequency of Communication with Friends and Family:    • Frequency of Social Gatherings with Friends and Family:    • Attends [employer] Services:    • Active Member of Clubs or Organizations:    • Attends Club or Organization Meetings:    • Marital Status:    Intimate Partner Violence:    • Fear of Current or Ex-Partner:    • Emotionally Abused:    • Physically Abused:    • Sexually Abused:        PHYSICAL EXAM:  /78 (BP Location: Left arm, Patient Position: Sitting, BP Cuff Size: Adult)   Pulse 71   Temp 36.5 °C (97.7 °F) (Temporal)   Resp 12   Ht 1.854 m (6' 1\")   Wt 96.2 kg (212 lb)   SpO2 97%   BMI 27.97 kg/m²   Constitutional: He appears well-developed and well-nourished. He appears not diaphoretic. No distress.   HENT: Right Ear: External ear normal. Left Ear: External ear normal. Tympanic membranes clear and intact.   Patient wearing mask.   Eyes: Conjunctivae and extraocular motions are normal. Pupils are equal, " round, and reactive to light. No scleral icterus.   Neck: Normal range of motion. Neck supple. No thyromegaly present.   Cardiovascular: Normal rate, regular rhythm, normal heart sounds and intact distal pulses.  Exam reveals no gallop and no friction rub.  No murmur heard. No carotid bruits.   Pulmonary/Chest: Effort normal and breath sounds normal. No respiratory distress. No wheezes. No rales.   Abdominal: Soft. Bowel sounds are normal. He exhibits no distension and no mass. No tenderness. No rebound and no guarding.   Lymphadenopathy:  He has no cervical adenopathy. No inguinal adenopathy.   Neurological:He is alert. He has normal reflexes. No cranial nerve deficit. He exhibits normal muscle tone.   Skin: Skin is warm and dry. No rash noted. He is not diaphoretic. No erythema.   Psychiatric: He has a normal mood and affect. His behavior is normal.   Musculoskeletal: He exhibits no edema. Normal strength throughout. 2+ DTR throughout.       ASSESSMENT/PLAN:    This is a 45 y.o. male new patient.     1. Nonintractable episodic headache, unspecified headache type     2. Environmental and seasonal allergies  triamcinolone (NASACORT ALLERGY 24HR) 55 MCG/ACT nasal inhaler   3. Dry eyes     4. Gastroesophageal reflux disease, unspecified whether esophagitis present     5. Screening for diabetes mellitus  Blood Glucose   6. Screening, lipid  Lipid Profile   -Headaches-intermittent.  Stable. Appears may be multifactorial.  Reviewed recommendations for appropriate ergonomics. Recommend routine stress management, healthy diet and routine exercise.   -Environmental and seasonal allergies-use Nasacort as needed.  May consider use of air filter and nasal saline wash.  Recommend shower prior to bedtime.  Consider acupuncture therapy as needed.  -Dry eyes-followed by optometrist.  Continue routine follow-up with optometry.  -GERD-currently improved.  Continue modified diet.    May use medication as needed.  Recommend follow-up  with GI regarding prior abdominal imaging and CT.  Recommend screening lab work.  Patient will be traveling to [place] and [place].  Patient appears up-to-date with recommended vaccinations for travel.    Follow-up as needed after lab work.  Otherwise recommend routine annual well exam.    This medical record contains text that has been entered with the assistance of computer voice recognition and dictation software.  Therefore, it may contain unintended errors in text, spelling, punctuation, or grammar.    45 minutes spent for visit including review of records, counseling and coordination of care.

## 2021-05-25 DIAGNOSIS — Z11.52 ENCOUNTER FOR SCREENING FOR COVID-19: ICD-10-CM

## 2021-06-01 PROBLEM — K21.9 GASTROESOPHAGEAL REFLUX DISEASE: Status: ACTIVE | Noted: 2021-06-01

## 2021-06-01 PROBLEM — H04.123 DRY EYES: Status: ACTIVE | Noted: 2021-06-01

## 2021-06-03 ENCOUNTER — HOSPITAL ENCOUNTER (OUTPATIENT)
Dept: LAB | Facility: MEDICAL CENTER | Age: 46
End: 2021-06-03
Attending: FAMILY MEDICINE
Payer: COMMERCIAL

## 2021-06-03 DIAGNOSIS — Z11.52 ENCOUNTER FOR SCREENING FOR COVID-19: ICD-10-CM

## 2021-06-03 PROCEDURE — U0003 INFECTIOUS AGENT DETECTION BY NUCLEIC ACID (DNA OR RNA); SEVERE ACUTE RESPIRATORY SYNDROME CORONAVIRUS 2 (SARS-COV-2) (CORONAVIRUS DISEASE [COVID-19]), AMPLIFIED PROBE TECHNIQUE, MAKING USE OF HIGH THROUGHPUT TECHNOLOGIES AS DESCRIBED BY CMS-2020-01-R: HCPCS

## 2021-06-03 PROCEDURE — U0005 INFEC AGEN DETEC AMPLI PROBE: HCPCS

## 2021-09-08 ENCOUNTER — EH NON-PROVIDER (OUTPATIENT)
Dept: OCCUPATIONAL MEDICINE | Facility: CLINIC | Age: 46
End: 2021-09-08
Payer: COMMERCIAL

## 2021-09-08 DIAGNOSIS — Z02.89 ENCOUNTER FOR OCCUPATIONAL HEALTH EXAMINATION INVOLVING RESPIRATOR: ICD-10-CM

## 2021-09-08 PROCEDURE — 94375 RESPIRATORY FLOW VOLUME LOOP: CPT | Performed by: PREVENTIVE MEDICINE

## 2021-09-23 ENCOUNTER — IMMUNIZATION (OUTPATIENT)
Dept: OCCUPATIONAL MEDICINE | Facility: CLINIC | Age: 46
End: 2021-09-23
Payer: COMMERCIAL

## 2021-09-23 DIAGNOSIS — Z23 NEED FOR VACCINATION: Primary | ICD-10-CM

## 2021-09-23 PROCEDURE — 90686 IIV4 VACC NO PRSV 0.5 ML IM: CPT | Performed by: PREVENTIVE MEDICINE

## 2021-09-27 ENCOUNTER — OFFICE VISIT (OUTPATIENT)
Dept: MEDICAL GROUP | Facility: IMAGING CENTER | Age: 46
End: 2021-09-27
Payer: COMMERCIAL

## 2021-09-27 ENCOUNTER — TELEPHONE (OUTPATIENT)
Dept: MEDICAL GROUP | Facility: MEDICAL CENTER | Age: 46
End: 2021-09-27

## 2021-09-27 VITALS
WEIGHT: 218 LBS | TEMPERATURE: 97.2 F | DIASTOLIC BLOOD PRESSURE: 64 MMHG | OXYGEN SATURATION: 94 % | HEART RATE: 72 BPM | BODY MASS INDEX: 28.89 KG/M2 | HEIGHT: 73 IN | RESPIRATION RATE: 12 BRPM | SYSTOLIC BLOOD PRESSURE: 108 MMHG

## 2021-09-27 DIAGNOSIS — G50.0 TRIGEMINAL NEURALGIA OF LEFT SIDE OF FACE: ICD-10-CM

## 2021-09-27 PROCEDURE — 99213 OFFICE O/P EST LOW 20 MIN: CPT | Performed by: CLINICAL NURSE SPECIALIST

## 2021-09-27 ASSESSMENT — PAIN SCALES - GENERAL: PAINLEVEL: 1=MINIMAL PAIN

## 2021-09-27 ASSESSMENT — ENCOUNTER SYMPTOMS
BLURRED VISION: 1
SPEECH CHANGE: 0
SINUS PAIN: 0
MEMORY LOSS: 0
DIZZINESS: 0
FEVER: 0
SEIZURES: 0
FOCAL WEAKNESS: 0
DIAPHORESIS: 0
HEADACHES: 1
DOUBLE VISION: 0

## 2021-09-27 NOTE — PROGRESS NOTES
"Subjective     Gurmeet Mercer is a 45 y.o. male who presents with Tingling (on left side of face for last 2 weeks, lasts a few seconds)            HPI    Headaches. Chronic, worse with smoke.  Orbital pain x2 days one weeks ago, resolved spontaneously.      Tingling on left side of face x2 weeks, resolves in 5-10 seconds when occurs.  No positional correlation.  Recent dental surgery or facial trauma? No, but history of jaw clenching.  No nightguard in use currently  History of oral herpes? No  Chicken pox? Yes as child  Gait imbalance or spasticity? no  Incontinence? no  Memory problems? no  Electric shock in spines?  no    Review of Systems   Constitutional: Negative for diaphoresis and fever.   HENT: Negative for ear pain, hearing loss, sinus pain and tinnitus.    Eyes: Positive for blurred vision (gradual, days worse than others for years). Negative for double vision.   Genitourinary: Negative for urgency.   Neurological: Positive for headaches. Negative for dizziness, speech change, focal weakness and seizures.   Endo/Heme/Allergies: Positive for environmental allergies (present for many months).   Psychiatric/Behavioral: Negative for memory loss.           Allergies   Allergen Reactions   • Sulfa Drugs      Current Outpatient Medications on File Prior to Visit   Medication Sig Dispense Refill   • triamcinolone (NASACORT ALLERGY 24HR) 55 MCG/ACT nasal inhaler Administer 2 Sprays into affected nostril(S) every day. 16.9 mL 3   • acetaminophen (TYLENOL) 500 MG Tab Take 500-1,000 mg by mouth every 6 hours as needed.     • Ibuprofen (ADVIL) 200 MG Cap Take  by mouth.       No current facility-administered medications on file prior to visit.           Objective     /64   Pulse 72   Temp 36.2 °C (97.2 °F)   Resp 12   Ht 1.854 m (6' 1\")   Wt 98.9 kg (218 lb)   SpO2 94%   BMI 28.76 kg/m²      Physical Exam  Constitutional:       General: He is not in acute distress.     Appearance: He is not " ill-appearing, toxic-appearing or diaphoretic.   HENT:      Head: Normocephalic and atraumatic. No Santana's sign.      Salivary Glands: Right salivary gland is not diffusely enlarged. Left salivary gland is not diffusely enlarged.      Mouth/Throat:      Tongue: No lesions. Tongue does not deviate from midline.      Palate: No mass and lesions.      Tonsils: No tonsillar exudate.   Eyes:      Pupils: Pupils are equal, round, and reactive to light.   Cardiovascular:      Rate and Rhythm: Normal rate.   Pulmonary:      Effort: Pulmonary effort is normal.   Lymphadenopathy:      Head:      Right side of head: No submental, submandibular, tonsillar, preauricular, posterior auricular or occipital adenopathy.      Left side of head: Submandibular adenopathy present. No submental, tonsillar, preauricular, posterior auricular or occipital adenopathy.      Cervical: No cervical adenopathy.      Upper Body:      Right upper body: No supraclavicular adenopathy.      Left upper body: No supraclavicular adenopathy.   Skin:     General: Skin is warm and dry.   Neurological:      Mental Status: He is alert.   Psychiatric:         Mood and Affect: Mood normal.         Behavior: Behavior normal.         Thought Content: Thought content normal.         Judgment: Judgment normal.                     Assessment & Plan        1. Trigeminal neuralgia of left side of face.  Gurmeet is experiencing left-sided facial tingling intermittently for 2 weeks. The distribution is consistent with the trigeminal nerve. His headaches are not worsening since symptom onset.  His left submandibular lymph node is enlarged. No sinus tenderness or oral abnormalities.  Other physical findings unremarkable  This tingling is likely a viral etiology and will resolve spontaneously.  If symptoms do not improve in 2 weeks, Gurmeet will reach out to clinic for next steps, eg potential MRI.  If symptoms worsen, he is to reach out sooner.  Continue to monitor.      Return in about 2 weeks (around 10/11/2021), or if symptoms worsen or fail to improve, for check in with symptoms via my chart.      .

## 2021-09-27 NOTE — TELEPHONE ENCOUNTER
Patient would like to re-establish with myself, could you please call the patient and change PCP in epic to myself.

## 2021-09-29 ENCOUNTER — IMMUNIZATION (OUTPATIENT)
Dept: OCCUPATIONAL MEDICINE | Facility: CLINIC | Age: 46
End: 2021-09-29
Payer: COMMERCIAL

## 2021-09-29 DIAGNOSIS — Z23 ENCOUNTER FOR VACCINATION: Primary | ICD-10-CM

## 2021-09-29 PROCEDURE — 91300 PFIZER SARS-COV-2 VACCINE: CPT

## 2021-09-29 PROCEDURE — 0003A PFIZER SARS-COV-2 VACCINE: CPT

## 2021-11-04 ENCOUNTER — HOSPITAL ENCOUNTER (OUTPATIENT)
Facility: MEDICAL CENTER | Age: 46
End: 2021-11-04
Attending: PREVENTIVE MEDICINE
Payer: COMMERCIAL

## 2021-11-04 ENCOUNTER — OCCUPATIONAL MEDICINE (OUTPATIENT)
Dept: OCCUPATIONAL MEDICINE | Facility: CLINIC | Age: 46
End: 2021-11-04
Payer: COMMERCIAL

## 2021-11-04 VITALS
BODY MASS INDEX: 28.89 KG/M2 | WEIGHT: 218 LBS | OXYGEN SATURATION: 93 % | TEMPERATURE: 98.1 F | DIASTOLIC BLOOD PRESSURE: 82 MMHG | HEIGHT: 73 IN | SYSTOLIC BLOOD PRESSURE: 122 MMHG | HEART RATE: 76 BPM

## 2021-11-04 DIAGNOSIS — Z77.21 EXPOSURE TO BLOOD OR BODY FLUID: Primary | ICD-10-CM

## 2021-11-04 DIAGNOSIS — Z77.21 EXPOSURE TO BLOOD OR BODY FLUID: ICD-10-CM

## 2021-11-04 LAB
AMP AMPHETAMINE: NORMAL
BAR BARBITURATES: NORMAL
BREATH ALCOHOL COMMENT: NORMAL
BZO BENZODIAZEPINES: NORMAL
COC COCAINE: NORMAL
INT CON NEG: NORMAL
INT CON POS: NORMAL
MDMA ECSTASY: NORMAL
MET METHAMPHETAMINES: NORMAL
MTD METHADONE: NORMAL
OPI OPIATES: NORMAL
OXY OXYCODONE: NORMAL
PCP PHENCYCLIDINE: NORMAL
POC BREATHALIZER: 0 PERCENT (ref 0–0.01)
POC URINE DRUG SCREEN OCDRS: NORMAL
THC: NORMAL

## 2021-11-04 PROCEDURE — 99212 OFFICE O/P EST SF 10 MIN: CPT | Performed by: PREVENTIVE MEDICINE

## 2021-11-04 PROCEDURE — 86704 HEP B CORE ANTIBODY TOTAL: CPT

## 2021-11-04 PROCEDURE — 86803 HEPATITIS C AB TEST: CPT

## 2021-11-04 PROCEDURE — 85027 COMPLETE CBC AUTOMATED: CPT

## 2021-11-04 PROCEDURE — 80053 COMPREHEN METABOLIC PANEL: CPT

## 2021-11-04 PROCEDURE — 87389 HIV-1 AG W/HIV-1&-2 AB AG IA: CPT

## 2021-11-04 PROCEDURE — 87340 HEPATITIS B SURFACE AG IA: CPT

## 2021-11-04 PROCEDURE — 86706 HEP B SURFACE ANTIBODY: CPT

## 2021-11-04 PROCEDURE — 82075 ASSAY OF BREATH ETHANOL: CPT | Performed by: PREVENTIVE MEDICINE

## 2021-11-04 PROCEDURE — 80305 DRUG TEST PRSMV DIR OPT OBS: CPT | Performed by: PREVENTIVE MEDICINE

## 2021-11-04 NOTE — PROGRESS NOTES
"Subjective:     Gurmeet Mercer is a 46 y.o. 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  He was trying to lock the needle after injecting contrast and accidentally stuck himself in the left hand.  Source is known but current status is unknown.  Source labs were drawn.    ROS         Objective:     /82   Pulse 76   Temp 36.7 °C (98.1 °F)   Ht 1.854 m (6' 1\")   Wt 98.9 kg (218 lb)   SpO2 93%   BMI 28.76 kg/m²       Constitutional: Patient is in no acute distress. Appears well-developed and well-nourished.   HENT: Normocephalic and atraumatic. EOM are normal. No scleral icterus.   Cardiovascular: Normal rate.    Pulmonary/Chest: Effort normal. No respiratory distress.   Neurological: Patient is alert and oriented to person, place, and time.   Skin: Skin is warm and dry.   Psychiatric: Normal mood and affect. Behavior is normal.       Assessment/Plan:       1. Exposure to blood or body fluid  - EXPOSED PERSON-SOURCE PT POSITIVE OR UNK (BLOOD & BODY FLUID EXPOSURE); Future    Released to Full Duty FROM 11/4/2021 TO 11/8/2021     Source labs pending  Baseline labs drawn  Discussed risk and benefits of HIV prophylaxis in this scenario, will hold off on HIV prophylaxis until source labs resulted  If source positive for HIV will prescribe HIV prophylaxis and proceed with CDC mon  itoring protocol  If hepatitis C positive will proceed with CDC monitoring protocol  Patient has received hep B immunization series  Tetanus is up to date  Follow-up Monday for lab results, will call sooner if sources positive      Differential diagnosis, natural history, supportive care, and indications for immediate follow-up discussed.    Approximately 15 minutes was spent in preparing for visit, obtaining history, exam and evaluation, patient counseling/education and post visit documentation/orders.  "

## 2021-11-04 NOTE — LETTER
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    Medical Information   Related to Industrial Injury? 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  He was trying to lock the needle after injecting contrast and accidentally stuck himself in the left hand.  Source is known but current status is unknown.  Source labs were drawn.   Objective Findings: Constitutional: Patient is in no acute distress. Appears well-developed and well-nourished.   HENT: Normocephalic and atraumatic. EOM are normal. No scleral icterus.   Cardiovascular: Normal rate.    Pulmonary/Chest: Effort normal. No respiratory distress.   Neurological: Patient is alert and oriented to person, place, and time.   Skin: Skin is warm and dry.   Psychiatric: Normal mood and affect. Behavior is normal.      Pre-Existing Condition(s):     Assessment:   Condition Same    Status: Additional Care Required  Permanent Disability:No    Plan:      Diagnostics:      Comments:  Source labs pending  Baseline labs drawn  Discussed risk and benefits of HIV prophylaxis in this scenario, will hold off on HIV prophylaxis until source labs resulted  If source positive for HIV will prescribe HIV prophylaxis and proceed with CDC mon  itoring protocol  If hepatitis C positive will proceed with CDC monitoring protocol  Patient has received hep B immunization series  Tetanus is up to date  Follow-up  Monday for lab results, will call sooner if sources positive      Disability Information   Status: Released to Full Duty    From:  11/4/2021  Through: 11/8/2021 Restrictions are:     Physical Restrictions   Sitting:    Standing:    Stooping:    Bending:      Squatting:    Walking:    Climbing:    Pushing:      Pulling:    Other:    Reaching Above Shoulder (L):   Reaching Above Shoulder (R):       Reaching Below Shoulder (L):    Reaching Below Shoulder (R):      Not to exceed Weight Limits   Carrying(hrs):   Weight Limit(lb):   Lifting(hrs):   Weight  Limit(lb):     Comments:      Repetitive Actions   Hands: i.e. Fine Manipulations from Grasping:     Feet: i.e. Operating Foot Controls:     Driving / Operate Machinery:     Health Care Provider’s Original or Electronic Signature  Céasr Mohr D.O. Health Care Provider’s Original or Electronic Signature    Main Edwards MD         Clinic Name / Location: [address],   [address] [place] [address] Clinic Phone Number: Dept: [phone]   Appointment Time: 2:30 Pm Visit Start Time: 11:56 AM   Check-In Time:  11:53 Am Visit Discharge Time:  12:37pm   Original-Treating Physician or Chiropractor    Page 2-Insurer/TPA    Page 3-Employer    Page 4-Employee

## 2021-11-04 NOTE — LETTER
"EMPLOYEE’S CLAIM FOR COMPENSATION/ REPORT OF INITIAL TREATMENT  FORM C-4    EMPLOYEE’S CLAIM - PROVIDE ALL INFORMATION REQUESTED   First Name  Gurmeet Last Name  Ruslan Birthdate                    1975                Sex  male Claim Number (Insurer’s Use Only)    Home Address  [address] [address] Age  46 y.o. 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 (if applicable)  CONTRAST INJECTION    How did this injury or occupational disease occur? (Be specific an answer in detail. Use additional sheet if necessary)  SAFETY ON NEEDLE DID NOT FULLY LOCK. (BUTTERFLY)   If you believe that you have an occupational disease, when did you first have knowledge of the disability and it relationship to your employment?  N/A Witnesses to the Accident  N/A      Nature of Injury or Occupational Disease  Puncture  Part(s) of Body Injured or Affected  Hand (L), ,     I certify that the above is true and correct to the best of my knowledge and that I have provided this information in order to obtain the benefits of [place]’s " [place]  I hereby authorize any physician, chiropractor, surgeon, practitioner, or other person, any hospital, including [facility] or [facility], any medical service organization, any insurance company, or other institution or organization to release to each other, any medical or other information, including benefits paid or payable, pertinent to this injury or disease, except information relative to diagnosis, treatment and/or counseling for AIDS, psychological conditions, alcohol or controlled substances, for which I must give specific authorization.  A Photostat of this authorization shall be as valid as the original.     Date: 11/4/2021   Place: [facility] Employee’s Original or  *Electronic Signature   THIS REPORT MUST BE COMPLETED AND MAILED WITHIN 3 WORKING DAYS OF TREATMENT   Place  [facility]  Name of Facility  [facility]   Date  11/4/2021 Diagnosis and Description of Injury or Occupational Disease  (Z77.21) Exposure to blood or body fluid Is there evidence the injured employee was under the influence of alcohol and/or another controlled substance at the time of accident?  ? No ? Yes (if yes, please explain)    Hour  11:56 AM   The encounter diagnosis was Exposure to blood or body fluid. No   Treatment     Have you advised the patient to remain off work five days or     more?    X-Ray Findings      ? Yes Indicate dates:   From   To      From information given by the employee, together with medical evidence, can        you directly connect this injury or occupational disease as job incurred?  Yes ? No If no, is the injured employee capable of:  ? full duty  Yes ? modified duty      Is additional medical care by a physician indicated?  Yes If Modified Duty, Specify any Limitations / Restrictions      Do you know of any previous injury or disease contributing to  "this condition or occupational disease?  ? Yes ? No (Explain if yes)                          No   Date  11/4/2021 Print Health Care Provider's   César Mohr D.O. I certify the employer’s copy of  this form was mailed on:   Address  [address],   [address] Insurer’s Use Only     [facility] Zip  [address]    Provider’s Tax ID Number  [id number] Telephone  Dept: [phone]             Health Care Provider’s Original or Electronic Signature  e-SignTAYLORCÉSAR D.O. Degree (MD,DO, DC,PASANG,APRN)         * Complete and attach Release of Information (Form C-4A) when injured employee signs C-4 Form electronically  ORIGINAL - TREATING HEALTHCARE PROVIDER PAGE 2 - INSURER/TPA PAGE 3 - EMPLOYER PAGE 4 - EMPLOYEE             Form C-4 (rev.08/21)           BRIEF DESCRIPTION OF RIGHTS AND BENEFITS  (Pursuant to [place] 616C.050)    Notice of Injury or Occupational Disease (Incident Report Form C-1): If an injury or occupational disease (OD) arises out of and in the course of employment, you must provide written notice to your employer as soon as practicable, but no later than 7 days after the accident or OD. Your employer shall maintain a sufficient supply of the required forms.    Claim for Compensation (Form C-4): If medical treatment is sought, the form C-4 is available at the place of initial treatment. A completed \"Claim for Compensation\" (Form C-4) must be filed within 90 days after an accident or OD. The treating physician or chiropractor must, within 3 working days after treatment, complete and mail to the employer, the employer's insurer and third-party , the Claim for Compensation.    Medical Treatment: If you require medical treatment for your on-the-job injury or OD, you may be required to select a physician or chiropractor from a list provided by your workers’ compensation insurer, if it has contracted with an Organization for Managed Care (MCO) or Preferred Provider Organization (PPO) or " providers of health care. If your employer has not entered into a contract with an MCO or PPO, you may select a physician or chiropractor from the Panel of Physicians and Chiropractors. Any medical costs related to your industrial injury or OD will be paid by your insurer.    Temporary Total Disability (TTD): If your doctor has certified that you are unable to work for a period of at least 5 consecutive days, or 5 cumulative days in a 20-day period, or places restrictions on you that your employer does not accommodate, you may be entitled to TTD compensation.    Temporary Partial Disability (TPD): If the wage you receive upon reemployment is less than the compensation for TTD to which you are entitled, the insurer may be required to pay you TPD compensation to make up the difference. TPD can only be paid for a maximum of 24 months.    Permanent Partial Disability (PPD): When your medical condition is stable and there is an indication of a PPD as a result of your injury or OD, within 30 days, your insurer must arrange for an evaluation by a rating physician or chiropractor to determine the degree of your PPD. The amount of your PPD award depends on the date of injury, the results of the PPD evaluation, your age and wage.    Permanent Total Disability (PTD): If you are medically certified by a treating physician or chiropractor as permanently and totally disabled and have been granted a PTD status by your insurer, you are entitled to receive monthly benefits not to exceed 66 2/3% of your average monthly wage. The amount of your PTD payments is subject to reduction if you previously received a lump-sum PPD award.    Vocational Rehabilitation Services: You may be eligible for vocational rehabilitation services if you are unable to return to the job due to a permanent physical impairment or permanent restrictions as a result of your injury or occupational disease.    Transportation and Per Marlys Reimbursement: You may be  eligible for travel expenses and per raina associated with medical treatment.    Reopening: You may be able to reopen your claim if your condition worsens after claim closure.     Appeal Process: If you disagree with a written determination issued by the insurer or the insurer does not respond to your request, you may appeal to the Department of Administration, , by following the instructions contained in your determination letter. You must appeal the determination within 70 days from the date of the determination letter at [address], [address], [place] [address], or [address], [address], [place], [place] [address]. If you disagree with the  decision, you may appeal to the Department of Administration, . You must file your appeal within 30 days from the date of the  decision letter at [address], [address], [place], [place] [address], or [address], [address], [place], [place] [address]. If you disagree with a decision of an , you may file a petition for judicial review with the District Court. You must do so within 30 days of the Appeal Officer’s decision. You may be represented by an  at your own expense or you may contact the [facility] for possible representation.    [place]  for Injured Workers (NAIW): If you disagree with a  decision, you may request that NAIW represent you without charge at an  Hearing. For information regarding denial of benefits, you may contact the [facility] at: [address], [address], [place] [address], [phone], or [address], [address], [place] [address], [phone]    To File a Complaint with the Division: If you wish to file a complaint with the  of the Division of Industrial Relations (DIR),  please contact the Workers’ Compensation Section, [address], [address], [place]  [place], [place] [address], telephone [phone]) [phone], or [address], [address], [place], [place] [address], telephone [phone]) [phone].    For assistance with Workers’ Compensation Issues: You may contact the [facility] Office for Consumer Health Assistance, [address], [address], [place] [address], Toll Free 1-272.993.1632, Web site: http://[facility].[place].gov/Programs/KEVEN E-mail: keven@[facility].nv.[facility]              __________________________________________________________________                                    _________________            Employee Name / Signature                                                                                                                            Date                                                                                                                                                                                                                              D-2 (rev. 10/20)

## 2021-11-05 PROBLEM — H04.123 DRY EYES: Status: RESOLVED | Noted: 2021-06-01 | Resolved: 2021-11-05

## 2021-11-05 PROBLEM — M25.539 WRIST PAIN: Status: ACTIVE | Noted: 2021-11-05

## 2021-11-05 PROBLEM — K21.9 GASTROESOPHAGEAL REFLUX DISEASE: Status: RESOLVED | Noted: 2021-06-01 | Resolved: 2021-11-05

## 2021-11-05 LAB
ALBUMIN SERPL BCP-MCNC: 4.8 G/DL (ref 3.2–4.9)
ALBUMIN/GLOB SERPL: 1.4 G/DL
ALP SERPL-CCNC: 75 U/L (ref 30–99)
ALT SERPL-CCNC: 22 U/L (ref 2–50)
ANION GAP SERPL CALC-SCNC: 14 MMOL/L (ref 7–16)
AST SERPL-CCNC: 15 U/L (ref 12–45)
BILIRUB SERPL-MCNC: 0.3 MG/DL (ref 0.1–1.5)
BUN SERPL-MCNC: 15 MG/DL (ref 8–22)
CALCIUM SERPL-MCNC: 10.3 MG/DL (ref 8.5–10.5)
CHLORIDE SERPL-SCNC: 103 MMOL/L (ref 96–112)
CO2 SERPL-SCNC: 23 MMOL/L (ref 20–33)
CREAT SERPL-MCNC: 0.96 MG/DL (ref 0.5–1.4)
ERYTHROCYTE [DISTWIDTH] IN BLOOD BY AUTOMATED COUNT: 42.5 FL (ref 35.9–50)
GLOBULIN SER CALC-MCNC: 3.5 G/DL (ref 1.9–3.5)
GLUCOSE SERPL-MCNC: 90 MG/DL (ref 65–99)
HBV CORE AB SERPL QL IA: NONREACTIVE
HBV SURFACE AB SERPL IA-ACNC: 3.87 MIU/ML (ref 0–10)
HBV SURFACE AG SER QL: ABNORMAL
HCT VFR BLD AUTO: 48.9 % (ref 42–52)
HCV AB SER QL: ABNORMAL
HGB BLD-MCNC: 16.3 G/DL (ref 14–18)
HIV 1+2 AB+HIV1 P24 AG SERPL QL IA: ABNORMAL
MCH RBC QN AUTO: 30.1 PG (ref 27–33)
MCHC RBC AUTO-ENTMCNC: 33.3 G/DL (ref 33.7–35.3)
MCV RBC AUTO: 90.4 FL (ref 81.4–97.8)
PLATELET # BLD AUTO: 279 K/UL (ref 164–446)
PMV BLD AUTO: 9.6 FL (ref 9–12.9)
POTASSIUM SERPL-SCNC: 4.4 MMOL/L (ref 3.6–5.5)
PROT SERPL-MCNC: 8.3 G/DL (ref 6–8.2)
RBC # BLD AUTO: 5.41 M/UL (ref 4.7–6.1)
SODIUM SERPL-SCNC: 140 MMOL/L (ref 135–145)
WBC # BLD AUTO: 7.5 K/UL (ref 4.8–10.8)

## 2021-11-08 ENCOUNTER — OCCUPATIONAL MEDICINE (OUTPATIENT)
Dept: OCCUPATIONAL MEDICINE | Facility: CLINIC | Age: 46
End: 2021-11-08
Payer: COMMERCIAL

## 2021-11-08 ENCOUNTER — OFFICE VISIT (OUTPATIENT)
Dept: MEDICAL GROUP | Facility: MEDICAL CENTER | Age: 46
End: 2021-11-08
Payer: COMMERCIAL

## 2021-11-08 VITALS
BODY MASS INDEX: 29.16 KG/M2 | OXYGEN SATURATION: 95 % | DIASTOLIC BLOOD PRESSURE: 62 MMHG | SYSTOLIC BLOOD PRESSURE: 116 MMHG | TEMPERATURE: 97.6 F | WEIGHT: 220 LBS | HEART RATE: 77 BPM | HEIGHT: 73 IN

## 2021-11-08 DIAGNOSIS — K21.9 GASTROESOPHAGEAL REFLUX DISEASE, UNSPECIFIED WHETHER ESOPHAGITIS PRESENT: ICD-10-CM

## 2021-11-08 DIAGNOSIS — Z00.00 PREVENTATIVE HEALTH CARE: ICD-10-CM

## 2021-11-08 DIAGNOSIS — Z77.21 EXPOSURE TO BLOOD OR BODY FLUID: ICD-10-CM

## 2021-11-08 DIAGNOSIS — Z12.5 PROSTATE CANCER SCREENING: ICD-10-CM

## 2021-11-08 DIAGNOSIS — R10.13 DYSPEPSIA: ICD-10-CM

## 2021-11-08 PROCEDURE — 99441 PR PHYSICIAN TELEPHONE EVALUATION 5-10 MIN: CPT | Mod: CR | Performed by: PREVENTIVE MEDICINE

## 2021-11-08 PROCEDURE — 99396 PREV VISIT EST AGE 40-64: CPT | Performed by: INTERNAL MEDICINE

## 2021-11-08 ASSESSMENT — FIBROSIS 4 INDEX: FIB4 SCORE: 0.54

## 2021-11-08 NOTE — PROGRESS NOTES
Telephone Appointment Visit   As a means of avoiding spread of COVID-19, this visit is being conducted by telephone. This telephone visit was initiated by the patient and they verbally consented.    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  He was trying to lock the needle after injecting contrast and accidentally stuck himself in the left hand.    Labs / Images Reviewed:   Source labs negative for HIV, hepatitis B and hepatitis C  Patient labs negative hepatitis B, hepatitis C and HIV.  Titers for hepatitis B are low.    Assessment and Plan:     1. Exposure to blood or body fluid  Recommend hepatitis B vaccine series  Given negative source and baseline testing no further follow-up or testing recommended  Released from care  Full duty  Follow-up as needed    Follow-up: None    Time at end of call: 1:32pm  Total Time Spent: 5-10 minutes    César Mohr D.O.

## 2021-11-08 NOTE — PROGRESS NOTES
Subjective     Gurmeet Mercer is a 46 y.o. male who presents with Annual Exam, Tingling (left  of face w/ swollen lymph nodes), and Eye Problem            HPI     Here for annual exam and follow up for facial numbness  In September had been having left facial numbness lasting a few seconds left side of face, no headache associated with this, no difficulty with speech or swallowing, no radiation down the arms, no sore throat or cough at the time, no fever. Now lasting a few seconds and occurring randomly, not associated with chewing or speaking, cold weather, or hot water, or stress. Has mouth guard for tooth grinding but does not use the mouth guard on a regular basis. Then he had the pfizer booster and had some arm swelling related to the but that resolved.   Has dry eyes and has seen  in the past, had been using eye drops in the past but did not see any improvement, more blurry with the weather changes, no double vision or loss of vision   Gerd takes pepcid as needed or usman seltzer but typically symptoms do not occur regularly, and oftentimes depends on certain foods which will exacerbate the issue such as tomatoes, garlic  SH has been trying to watch his diet, and has purchased bikes to go riding, coffee in am, drinks water, no soda, rare etoh       Current Outpatient Medications   Medication Sig Dispense Refill   • triamcinolone (NASACORT ALLERGY 24HR) 55 MCG/ACT nasal inhaler Administer 2 Sprays into affected nostril(S) every day. 16.9 mL 3   • acetaminophen (TYLENOL) 500 MG Tab Take 500-1,000 mg by mouth every 6 hours as needed.     • Ibuprofen (ADVIL) 200 MG Cap Take  by mouth.       No current facility-administered medications for this visit.     Allergic rhinitis  8/1/17 start atrovent nasal, has tried astelin and nasonex previously      Dyslipidemia  9/11 chol 201,trig 179,hdl 37,ldl 128 start fish oil 2 bid  8/12 chol 183,trig 165,hdl 36,ldl 114  9/14/13 chol 208,trig 308,hdl 37,ldl  109  9/4/14 chol 210,trig 329,hdl 37,ldl 107  6/14/16 chol 220,trig 315,hdl 40,ldl 117  9/19/17 chol 210,trig 240,hdl 42,ldl 120  9/18/18 chol 204,trig 369,hdl 40,ldl 90  8/12/19 chol 215,trig 167,hdl 42,ldl 140     Family history brain aneurysm  In father  6/10 MRI/MRA brain negative, report mentioned abn right frontal, I spoke to  on the phone who reviewed the scans, no sig abnormality     Non-intractable tension headache  8/1/17 tension headache, occasional blurry vision left eye, referral back to ophthalmology, trial of nortriptyline 10 mg question tension headache, atypical migraine  12/4/17 tried pamelor no benefit, trial of acupuncture, ophthalmology evaluation pending   3/15/18 dr.hale ophthalmology note, scintillating scotomata, likely complicated migraine recommend different of lactic agent, referral to neurology , trial of xiidra drops for dry eyes, severe amblyopia  11/30/18  neurology note headache imaging negative, no evidence of autoimmune disease although markers will be checked, associated vision changes, will schedule EEG  4/29/19 EEG video negative  5/7/19 neurology note daily headache, no criteria for migraine, trial of anaprox 550 mg as needed up to 3 times daily, visual changes no clear etiology, very infrequent, could be migrainous epiphenomena, do not believe medication is worthwhile, consider ambulatory EEG in the future     Preventative health  9/21/12 tdap  7/5/16 apnea link negative  6/7/19 colonoscopy per [facility] normal mucosa 2 mm cecum polyp pathology normal mucosa  8/12/19 vit d 29 start 2000 units   Refractive amblyopia  3/1/16  eye exam, also dry eyes  1/10/21 covid pfizer second  9/23/21 flu   9/29/21 covid pfizer booster     vision changes  4/29/19 EEG video negative  5/7/19 neurology note daily headache, no criteria for migraine, trial of anaprox 550 mg as needed up to 3 times daily, visual changes no clear etiology, very infrequent, could be  migrainous epiphenomena, do not believe medication is worthwhile, consider ambulatory EEG in the future     wrist pain  8/25/20 occupational medicine note right wrist burning sensation, x-ray ordered  8/25/20 x-ray right wrist negative  11/4/20 occupational medicine note MRI ordered, consider physiatry evaluation  11/19/20 physical therapy note  11/23/20 MRI right wrist without, dorsal radial ulnar strut and proximal triangular fibrocartilage tears with associated ganglion cyst 12 x 8 x 3 mm under the extensor carpi ulnaris, partial scapholunate ligament tear volar surface with 11 mm volar radial ganglion cyst  12/2/20 occupational medicine note referral to hand surgery               Patient Active Problem List   Diagnosis   • Family history of brain aneurysm   • Preventative health care   • Dyslipidemia   • Erectile dysfunction   • Refractive amblyopia   • Allergic rhinitis   • Nonintractable episodic headache   • Visual changes   • Wrist pain             Health Maintenance Summary          IMM DTaP/Tdap/Td Vaccine (2 - Td or Tdap) Next due on 9/21/2022 09/21/2012  Imm Admin: Tdap Vaccine    01/13/2006  Imm Admin: TD Vaccine          IMM HEP B VACCINE (Series Information) Aged Out    07/27/2009  Imm Admin: Hepatitis B Vaccine Adolescent/Pediatric    07/27/2009  Imm Admin: Hepatitis B Vaccine (Adol/Adult)    01/16/2007  Imm Admin: Hepatitis B Vaccine (Adol/Adult)    01/13/2006  Imm Admin: Hepatitis B Vaccine (Adol/Adult)          IMM INFLUENZA (Series Information) Completed    09/23/2021  Imm Admin: Influenza Vaccine Quad Inj (Pf)    11/10/2020  Imm Admin: Influenza Vaccine Quad Inj (Pf)    09/25/2019  Imm Admin: Influenza Vaccine Quad Inj (Pf)    10/02/2018  Imm Admin: Influenza Vaccine Quad Inj (Pf)    11/08/2017  Imm Admin: Influenza Vaccine Quad Inj (Pf)    Only the first 5 history entries have been loaded, but more history exists.          COVID-19 Vaccine (Series Information) Completed    09/29/2021  Imm  "Admin: Pfizer SARS-CoV-2 Vaccine    01/10/2021  Imm Admin: Pfizer SARS-CoV-2 Vaccine    12/20/2020  Imm Admin: Pfizer SARS-CoV-2 Vaccine          IMM MENINGOCOCCAL VACCINE (MCV4) (Series Information) Aged Out    No completion history exists for this topic.          IMM PNEUMOCOCCAL VACCINE: 0-64 Years (Series Information) Aged Out    No completion history exists for this topic.                Patient Care Team:  Dheeraj Burr M.D. as PCP - General (Internal Medicine)      ROS           Objective     /62 (BP Location: Right arm, Patient Position: Sitting)   Pulse 77   Temp 36.4 °C (97.6 °F) (Temporal)   Ht 1.854 m (6' 1\")   Wt 99.8 kg (220 lb)   SpO2 95%   BMI 29.03 kg/m²      Physical Exam  Vitals and nursing note reviewed.   Constitutional:       Appearance: Normal appearance. He is normal weight. He is not ill-appearing.   HENT:      Head: Normocephalic and atraumatic.      Right Ear: External ear normal.      Left Ear: External ear normal.   Eyes:      Conjunctiva/sclera: Conjunctivae normal.   Cardiovascular:      Rate and Rhythm: Normal rate and regular rhythm.      Heart sounds: Normal heart sounds.   Pulmonary:      Effort: No respiratory distress.      Breath sounds: Normal breath sounds.   Abdominal:      General: There is no distension.   Musculoskeletal:      Cervical back: Neck supple.   Skin:     General: Skin is warm.   Neurological:      General: No focal deficit present.      Mental Status: He is alert.   Psychiatric:         Mood and Affect: Mood normal.         Behavior: Behavior normal.       No TMJ tenderness bilateral, no rash or evidence of shingles  No tenderness over maxillary region, normal sensation to light touch  No periorbital edema  No scalp edema or tenderness          Assessment & Plan        Assessment  #! Annual exam     #2 dry eyes has seen Dr. Gutierrez in the past and tried eyedrops with no benefit    #3 left facial numbness, not described as pain, not precipitated by " anything, does not seem to be related to activity such as chewing, smiling, swallowing food, or tooth grinding, do not suspect that this is trigeminal neuralgia, symptoms do not appear to be progressing, no other signs or symptoms of demyelinating disease or cervical radiculopathy    #4 occasional bloating, gas, reflux, possible component of IBS    #5 history of headaches has seen neurology    #6 Prev health  9/21/12 tdap  7/5/16 apnea link negative  6/7/19 colonoscopy per [facility] normal mucosa 2 mm cecum polyp pathology normal mucosa  8/12/19 vit d 29 start 2000 units   3/1/16  eye exam, also dry eyes  1/10/21 covid pfizer second  9/23/21 flu   9/29/21 covid pfizer booster    Plan  #1 patient will let me know who he wants to see for an eye exam, he can send me a [employer] message    #2 recommend keeping a food diary to see which foods typically will contribute to gas, bloating, reflux    #3 can try over-the-counter probiotic such as align daily    #4 labs    #5 up-to-date on vaccines    #6 follow-up with his dentist    #7 start regular exercise program, continue limit sweets in his diet    #8 monitor left facial numbness, if symptoms persist consider MRI brain with and without evaluate trigeminal neuralgia or demyelinating disorder but given the fact that the symptoms last only a few seconds at a time and does not appear to be progressing, I agree with monitoring

## 2021-11-08 NOTE — LETTER
[address],   [address] [place] [address]  Phone:  [phone] - Fax:  [phone]   Occupational Health [facility] Progress Report and Disability Certification  Date of Service: 11/8/2021   No Show:  No  Date / Time of Next Visit:  Release from care   Claim Information   Patient Name: Gurmeet Mercer  Claim Number:     Employer: [employer]  Date of Injury:      Insurer / TPA: [employer]  ID / SSN:     Occupation:   Diagnosis: The encounter diagnosis was Exposure to blood or body fluid.    Medical Information   Related to Industrial Injury? 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  He was trying to lock the needle after injecting contrast and accidentally stuck himself in the left hand.   Objective Findings: Source labs negative for HIV, hepatitis B and hepatitis C  Patient labs negative hepatitis B, hepatitis C and HIV.  Titers for hepatitis B are low.   Pre-Existing Condition(s):     Assessment:   Condition Improved    Status: Discharged /  MMI  Permanent Disability:No    Plan:      Diagnostics:      Comments:  Recommend hepatitis B vaccine series  Given negative source and baseline testing no further follow-up or testing recommended  Released from care  Full duty  Follow-up as needed    Disability Information   Status: Released to Full Duty    From:  11/8/2021  Through:   Restrictions are:     Physical Restrictions   Sitting:    Standing:    Stooping:    Bending:      Squatting:    Walking:    Climbing:    Pushing:      Pulling:    Other:    Reaching Above Shoulder (L):   Reaching Above Shoulder (R):       Reaching Below Shoulder (L):    Reaching Below Shoulder (R):      Not to exceed Weight Limits   Carrying(hrs):   Weight Limit(lb):   Lifting(hrs):   Weight  Limit(lb):     Comments:      Repetitive Actions   Hands: i.e. Fine Manipulations from Grasping:     Feet: i.e. Operating Foot  Controls:     Driving / Operate Machinery:     Health Care Provider’s Original or Electronic Signature  César Mohr D.O. Health Care Provider’s Original or Electronic Signature    Main Edwards MD         Clinic Name / Location: [address],   [address]  [place] [address] Clinic Phone Number: Dept: [phone]   Appointment Time: 1:30 Pm Visit Start Time: 1:36 PM   Check-In Time:  1:29 Pm Visit Discharge Time:  1:40pm   Original-Treating Physician or Chiropractor    Page 2-Insurer/TPA    Page 3-Employer    Page 4-Employee

## 2021-11-15 ENCOUNTER — EH NON-PROVIDER (OUTPATIENT)
Dept: OCCUPATIONAL MEDICINE | Facility: CLINIC | Age: 46
End: 2021-11-15
Payer: COMMERCIAL

## 2021-11-15 DIAGNOSIS — Z23 NEED FOR VACCINATION: ICD-10-CM

## 2021-11-15 PROCEDURE — 90746 HEPB VACCINE 3 DOSE ADULT IM: CPT | Performed by: NURSE PRACTITIONER

## 2021-12-14 ENCOUNTER — EH NON-PROVIDER (OUTPATIENT)
Dept: OCCUPATIONAL MEDICINE | Facility: CLINIC | Age: 46
End: 2021-12-14
Payer: COMMERCIAL

## 2021-12-14 DIAGNOSIS — Z23 IMMUNIZATION DUE: ICD-10-CM

## 2021-12-14 PROCEDURE — 90746 HEPB VACCINE 3 DOSE ADULT IM: CPT | Performed by: PREVENTIVE MEDICINE

## 2021-12-14 PROCEDURE — 90471 IMMUNIZATION ADMIN: CPT | Performed by: PREVENTIVE MEDICINE

## 2022-01-24 ENCOUNTER — OFFICE VISIT (OUTPATIENT)
Dept: MEDICAL GROUP | Facility: MEDICAL CENTER | Age: 47
End: 2022-01-24
Payer: COMMERCIAL

## 2022-01-24 ENCOUNTER — TELEPHONE (OUTPATIENT)
Dept: MEDICAL GROUP | Facility: MEDICAL CENTER | Age: 47
End: 2022-01-24

## 2022-01-24 VITALS
BODY MASS INDEX: 29.82 KG/M2 | SYSTOLIC BLOOD PRESSURE: 116 MMHG | WEIGHT: 225 LBS | OXYGEN SATURATION: 95 % | HEIGHT: 73 IN | DIASTOLIC BLOOD PRESSURE: 68 MMHG | TEMPERATURE: 98 F | HEART RATE: 86 BPM

## 2022-01-24 DIAGNOSIS — M54.50 LOW BACK PAIN, UNSPECIFIED BACK PAIN LATERALITY, UNSPECIFIED CHRONICITY, UNSPECIFIED WHETHER SCIATICA PRESENT: ICD-10-CM

## 2022-01-24 PROCEDURE — 99213 OFFICE O/P EST LOW 20 MIN: CPT | Performed by: INTERNAL MEDICINE

## 2022-01-24 RX ORDER — TIZANIDINE 4 MG/1
4 TABLET ORAL 3 TIMES DAILY PRN
Qty: 30 TABLET | Refills: 3 | Status: SHIPPED | OUTPATIENT
Start: 2022-01-24 | End: 2022-04-05

## 2022-01-24 ASSESSMENT — FIBROSIS 4 INDEX: FIB4 SCORE: 0.54

## 2022-01-24 ASSESSMENT — PATIENT HEALTH QUESTIONNAIRE - PHQ9: CLINICAL INTERPRETATION OF PHQ2 SCORE: 0

## 2022-01-24 NOTE — LETTER
[facility]  Dheeraj Burr M.D.  [address]  [place] [id number]  Fax: [phone]   Authorization for Release/Disclosure of   Protected Health Information   Name: SHYAM HAY : 1975 SSN: [id number]   Address: [facility] [address]  [place] [id number] Phone:    [phone] (home) [phone] (work)   I authorize the entity listed below to release/disclose the PHI below to:   [facility]/Dheeraj Burr M.D. and Dheeraj Burr M.D.   Provider or Entity Name:                                                                 [facility]   Address   City, State, [facility]   Phone:      Fax:                 [phone]   Reason for request: continuity of care   Information to be released:   Old records   [  ] LAST COLONOSCOPY,  including any PATH REPORT and follow-up  [  ] LAST FIT/COLOGUARD RESULT [  ] LAST DEXA  [  ] LAST MAMMOGRAM  [  ] LAST PAP  [  ] LAST LABS [  ] RETINA EXAM REPORT  [  ] IMMUNIZATION RECORDS  [ x ] Release all info      [  ] Check here and initial the line next to each item to release ALL health information INCLUDING  _____ Care and treatment for drug and / or alcohol abuse  _____ HIV testing, infection status, or AIDS  _____ Genetic Testing    DATES OF SERVICE OR TIME PERIOD TO BE DISCLOSED: _____________  I understand and acknowledge that:  * This Authorization may be revoked at any time by you in writing, except if your health information has already been used or disclosed.  * Your health information that will be used or disclosed as a result of you signing this authorization could be re-disclosed by the recipient. If this occurs, your re-disclosed health information may no longer be protected by State or Federal laws.  * You may refuse to sign this Authorization. Your refusal will not affect your ability to obtain treatment.  * This Authorization becomes effective upon signing and will  on (date) __________.      If no date is indicated, this Authorization will  one (1)  year from the signature date.    Name: Gurmeet Mercer    Signature:                    Continuity of Care   Date:     1/25/2022       PLEASE FAX REQUESTED RECORDS BACK TO: [phone]

## 2022-01-25 NOTE — PROGRESS NOTES
Subjective     Gurmeet Mercer is a 46 y.o. male who presents with Follow-Up ([facility])            HPI       Patient here for low back pain, symptoms started on December 15 when he was putting on his shoes, no trauma, no falls, the pain started in the midline back region, felt initially like a muscle strain in his mid back with no associated radiation down the legs. No sensory changes of his feet, and he did notice initially perhaps a gait imbalance.  After the onset of his symptoms, he did try heat initially with some benefit. Does have adjustable bed with massage which helps.  He tried taking over-the-counter Advil 600 mg bid with no GI upset and tylenol as needed.  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  Patient has had low back injuries and symptoms in the past with radiation down his legs, this occurrence has not had any radiation, he also has an inversion table and TENS unit at home which he uses typically for flareups.Missed work Decemer 15 through December 21, had some vacation time off and went back to work January 5th and has been back at work until Jan 20-21, two thousand twenty-two when he could not work because of the worsening low back pain.  He has been at home resting since January 21.  He has tried Skelaxin and Flexeril previously for his back.  Prior to injuring his back he had been exercising riding outdoor bike once to twice per week prior to his injury.           Current Outpatient Medications   Medication Sig Dispense Refill   • triamcinolone (NASACORT ALLERGY 24HR) 55 MCG/ACT nasal  "inhaler Administer 2 Sprays into affected nostril(S) every day. 16.9 mL 3   • acetaminophen (TYLENOL) 500 MG Tab Take 500-1,000 mg by mouth every 6 hours as needed.     • Ibuprofen (ADVIL) 200 MG Cap Take  by mouth.       No current facility-administered medications for this visit.     Depression Screening    Little interest or pleasure in doing things?  0 - not at all  Feeling down, depressed , or hopeless? 0 - not at all  Patient Health Questionnaire Score: 0        Patient Care Team:  Dheeraj Burr M.D. as PCP - General (Internal Medicine)      ROS           Objective     /68 (BP Location: Right arm, Patient Position: Sitting, BP Cuff Size: Adult)   Pulse 86   Temp 36.7 °C (98 °F)   Ht 1.854 m (6' 1\")   Wt 102 kg (225 lb)   SpO2 95%   BMI 29.69 kg/m²      Physical Exam  Vitals and nursing note reviewed.   Constitutional:       Appearance: Normal appearance.   HENT:      Head: Normocephalic.      Right Ear: External ear normal.      Left Ear: External ear normal.   Eyes:      Conjunctiva/sclera: Conjunctivae normal.   Cardiovascular:      Rate and Rhythm: Regular rhythm.      Heart sounds: Normal heart sounds. No murmur heard.      Pulmonary:      Effort: No respiratory distress.      Breath sounds: Normal breath sounds.   Musculoskeletal:         General: No swelling or tenderness.   Skin:     General: Skin is warm.      Findings: No bruising.   Neurological:      General: No focal deficit present.      Mental Status: He is alert.   Psychiatric:         Mood and Affect: Mood normal.         Behavior: Behavior normal.       No spinal tenderness to palpation, no paraspinal lumbar muscle tenderness to palpation  Negative straight leg raise sign left side or right side  Normal dorsiflexion, plantarflexion, knee flexion extension bilateral, perhaps some decreased hip range of motion bilateral flexion, slight decreased range of motion bilateral hip abduction                       Assessment & Plan      " Assessment  #1 mechanical low back pain no radicular symptoms, no sensory changes, no diminished strength bilateral lower extremities, pain appears to have settled down with no precipitating factors although he has had sciatica type pain in the past, he does not have current sciatica type pain.  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  Rest does seem to improve the symptoms.    Plan  #1 given the timeframe of his onset of symptoms, and the fact that he tried to go back to work but likely tried to return to work too quickly without adequate rest, causing a recurrence of symptoms, he unfortunately had to miss work on January 20 and January 21; also I believe he would need a longer duration of time off to ensure adequate healing and decrease the likelihood of recurrence    #2 work note provided for January 20 through January 21, patient unable to work due to back related injury not work-related but given the pain patient cannot perform his duties    #3 recommend continue ibuprofen up to 800 mg three times a day over-the-counter as needed, take with food so as not to cause any GI upset    #4 trial of Zanaflex 1 tablet every 8 hours as needed for muscle pain, can cause drowsiness    #5 obtain [place] Jan 20 throught Feb 24 continuous absence for chronic low back pain, I believe the patient needs this amount of time off to heal appropriately since he already tried coming back to work too soon causing a significant flareup of back pain, this timeframe will be important and allow the patient to start and continue physical therapy, this way he can focus on a dedicated exercise program working on his low back and core musculature understanding that he will need to continue indefinite lifelong exercises for his back even when he is feeling well to prevent future flareups to decrease the frequency,  intensity, and duration of these flareups    #6  Also recommend a 2nd FMLA form for intermittent absences due to flareup of back pain during which time he is not able to work because the back pain will limit his ability to care for patients and perform his duties, I think it would be reasonable to provide 1-3 days per wee as needed for flareups of the back pain, he can also check with his supervisor perhaps about changing his work schedule to 2 days/week.  He will drop off the forms here for completion.    #7 referral PT [place] placed     #8 old records ANU regarding his wrist      #9 once his back heals and he is able to work on a regular exercise program, keeping his weight down as this will help also to decrease the flareups of back pain

## 2022-01-26 ENCOUNTER — TELEPHONE (OUTPATIENT)
Dept: MEDICAL GROUP | Facility: MEDICAL CENTER | Age: 47
End: 2022-01-26

## 2022-01-26 DIAGNOSIS — M54.9 BACK PAIN, UNSPECIFIED BACK LOCATION, UNSPECIFIED BACK PAIN LATERALITY, UNSPECIFIED CHRONICITY: ICD-10-CM

## 2022-02-06 ENCOUNTER — TELEPHONE (OUTPATIENT)
Dept: MEDICAL GROUP | Facility: MEDICAL CENTER | Age: 47
End: 2022-02-06
Payer: COMMERCIAL

## 2022-02-07 NOTE — TELEPHONE ENCOUNTER
Please notify the patient that I have completed his FMLA continuous time off work from January 20 through February 24, 2022.    He can pick the forms up at the , please scan that into the system, the paperwork is on the counter.

## 2022-02-18 ENCOUNTER — PHARMACY VISIT (OUTPATIENT)
Dept: PHARMACY | Facility: MEDICAL CENTER | Age: 47
End: 2022-02-18
Payer: COMMERCIAL

## 2022-02-18 PROCEDURE — RXMED WILLOW AMBULATORY MEDICATION CHARGE: Performed by: INTERNAL MEDICINE

## 2022-02-24 ENCOUNTER — PHYSICAL THERAPY (OUTPATIENT)
Dept: PHYSICAL THERAPY | Facility: MEDICAL CENTER | Age: 47
End: 2022-02-24
Attending: INTERNAL MEDICINE
Payer: COMMERCIAL

## 2022-02-24 DIAGNOSIS — M54.9 BACK PAIN, UNSPECIFIED BACK LOCATION, UNSPECIFIED BACK PAIN LATERALITY, UNSPECIFIED CHRONICITY: ICD-10-CM

## 2022-02-24 PROCEDURE — 97162 PT EVAL MOD COMPLEX 30 MIN: CPT

## 2022-02-24 PROCEDURE — 97012 MECHANICAL TRACTION THERAPY: CPT

## 2022-02-24 ASSESSMENT — ENCOUNTER SYMPTOMS
PAIN SCALE: 1
PAIN SCALE AT LOWEST: 1
PAIN SCALE AT HIGHEST: 4

## 2022-02-24 NOTE — OP THERAPY EVALUATION
"  Outpatient Physical Therapy  INITIAL EVALUATION    [facility]  [address]  [place] [address]  Phone:  [phone]  Fax:  [phone]    Date of Evaluation: 02/24/2022    Patient: Gurmeet Mercer  YOB: 1975  MRN: [id number]     Referring Provider: Dheeraj Burr M.D.  [address]  [place] [address]   Referring Diagnosis Dorsalgia, unspecified [M54.9]     Time Calculation  Start time: 0954  Stop time: 1045 Time Calculation (min): 51 minutes         Chief Complaint: Back Problem    Visit Diagnoses     ICD-10-CM   1. Back pain, unspecified back location, unspecified back pain laterality, unspecified chronicity  M54.9       Date of onset of impairment: No data found    Subjective:   History of Present Illness:     Mechanism of injury:  *Pt several min late to appt today; resulted in delayed start time to session    Patient is a 44 year old male with a PMH including: Plantar fasciitis, tension HA's, ED, regractive amblyopia, visual changes. No surgical hx on file. Pt reporting chronic hx of back pain since 19's due to skiing and \"extreme sports.\"     Pt presents to therapy with complaints of sig back pain after shoveling driveway and putting on shoes 6 weeks ago, reports back pain was very severe, worse than episodes in the past. Pt reporting used inversion table, TENS unit, with improvement but continues to feel that back is \"unstable.\" Pt reporting can hear shifting of \"three of his vertebrae\" with forward lean that resolved. Pt returned to work but felt he \"could make things worse.\"    Pt is known to this provider. Pt seen 9/21/20-12/17/20  to address R wrist sprain in the past. Per MD note on 1/24/22, \"work note provided for January 20 through January 21, patient unable to work due to back related injury not work-related but given the pain patient cannot perform his duties; FMLA Jan 20 throught Feb 24 " "continuous absence for chronic low back pain.\" Next follow up with provider. Initially foot drag (does not recall which foot -resolved).    Currently denies changes in bowel and bladder, saddle anesthesia, significant weight changes, chills/night sweats, nausea and vomiting, and unexplained fatigue. Pt consents to evaluation and treatment today.           Pain:     Current pain ratin    At best pain ratin    At worst pain ratin    Location:  [facility] LBP with referral     Progression:  Improving    Pain Comments::  Aggravating: patient transfer, lifting, bending, prolonged standing>20 min, extend R knee in sitting    Relieving: TENS, inversion table, resting, ibuprofen      Walking and sitting: unchanged  Diagnostic Tests:     None    Activities of Daily Living:     Patient reported ADL status: Patient's current daily routine includes:  Work: [employer] tech  Exercise:  cat-cow, Qped raises, superman crunches with knees up, modified plank-become sore and needs TENS following; prev biking 3-4 months ago (currently min due to LBP)              Past Medical History:   Diagnosis Date   • Plantar fasciitis 2009   • Tension headache 2009     No past surgical history on file.  Social History     Tobacco Use   • Smoking status: Former Smoker     Packs/day: 1.00     Years: 10.00     Pack years: 10.00   • Smokeless tobacco: Never Used   • Tobacco comment:  quit   Substance Use Topics   • Alcohol use: Yes     Comment: rarely     Family and Occupational History     Socioeconomic History   • Marital status:      Spouse name: Not on file   • Number of children: Not on file   • Years of education: Not on file   • Highest education level: Not on file   Occupational History   • Not on file       Objective     Postural Observations  Seated posture: poor  Standing posture: fair  Correction of posture: has no consistent effect    Additional Postural Observation Details  Forward head and rounded shoulders "     Neurological Testing     Myotome testing   Lumbar (left)   L1 (hip flexors): 4+  L2 (hip flexors): 4+  L3 (knee extensors): 5  L4 (ankle dorsiflexors): 5  L5 (great toe extension): 5  S1 (ankle plantar flexors): 5    Lumbar (right)   L1 (hip flexors): 4+  L2 (hip flexors): 4+  L3 (knee extensors): 5  L4 (ankle dorsiflexors): 5  L5 (great toe extension): 5  S1 (ankle plantar flexors): 5    Dermatome testing   Lumbar (left)   All left lumbar dermatomes intact    Lumbar (right)   All right lumbar dermatomes intact    Additional Neurological Details  Pt able to stand on heels and toes comfortably    Palpation     Additional Palpation Details  L/S paraspinals B no TTP    Active Range of Motion     Lumbar   Flexion: decreased (FT to mid shins; dami's sign to return to neutral with report back pain)  Extension: decreased (increase back pain; mod restricted)  Left lateral flexion: Left lateral lumbar spine flexion: side glide decreased to L; painless B.    Joint Play   Spine     [place]        L3: hypomobile       L4: hypomobile and painful       L5: hypomobile and painful       S1: hypomobile        Tests     Additional Tests Details  SLR neg B for LBP, however, reproduction of buttock pain B    General Comments     Spine Comments   Prone lying: initially pain-free but onset of back pain following resting x 2 min        Therapeutic Treatments and Modalities:     1. Mechanical Traction (CPT 64491), [facility] l/s traction     2. Manual Therapy (CPT 56824), x6 min; trial of manual l/s traction in hooklying with belt//decrease s/s during, no increase with release-return to baseline    Time-based treatments/modalities:    Physical Therapy Timed Treatment Charges  Manual therapy minutes (CPT 31855): 6 minutes      Assessment, Response and Plan:   Impairments: abnormal or restricted ROM, activity intolerance, difficulty performing job, impaired physical strength, lacks appropriate home exercise program and pain with  "function    Assessment details:  Patient is a pleasant and cooperative 46 year old male who is known to this provider; prev seen in therapy to address wrist pain complaints. He returns today with c/o back pain x6 weeks following shoveling and bending over. Reports chronic hx of back pain since 20's from \"extreme sports\" but reports this back pain was \"more intense.\" Reports initially had minor foot drag which has resolved. No n/t or sig strength changes currently; denies red flags. No imaging.    Exam findings suggestive of neural tension B with buttock pain, however, not reproductive of LBP, hypomobile L4-5 with reproduction of back pain, impaired l/s AROM extension>flexion with pain, poor postural awareness. Dermatomes and myotomes grossly normal with exception of minor weakness B L4. 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 Pt may benefit from skilled physical therapy in order to address above impairments in order to improve QOL and return to reported ADL's.     Goals:   Short Term Goals:   1. Pt will be independent with written HEP.    Short term goal time span:  2-4 weeks      Long Term Goals:    1. Pt will be independent with written HEP.  2. Pt will have a sig improvement in RMQ score>/= MDC of 5pts or 30% from baseline (eval: 37.5)  3. Pt will be able to demonstrate full and pain-free l/s ROM in order to perform bending tasks at work including working with machinery.   4. Pt will demonstrate safe lifting mechanics in pain-free manner in order to transfer patients at work safely.  5. Pt will be able to stand for 60 min or longer 75% or more of the time without requiring position change due to back pain in order to perform job related tasks and ADL's within the home.       Plan:   Therapy options:  Physical therapy treatment to continue  Planned therapy interventions:  Neuromuscular Re-education (CPT 88126), Gait Training (CPT 11002), Mechanical Traction (CPT 05992), E Stim " Unattended (CPT 42330), Therapeutic Exercise (CPT 37542), Therapeutic Activities (CPT 04068) and Manual Therapy (CPT 77396)  Frequency: 1-2x/wk   Duration in weeks:  6  Discussed with:  Patient  Plan details:  UPOC: 4/8/22          Functional Assessment Used  Zac Satinder Low Back Pain and Disability Score: 37.5     Referring provider co-signature:  I have reviewed this plan of care and my co-signature certifies the need for services.    Certification Period: 02/24/2022 to 4/8/22    Physician Signature: ________________________________ Date: ______________

## 2022-03-01 ENCOUNTER — PHYSICAL THERAPY (OUTPATIENT)
Dept: PHYSICAL THERAPY | Facility: MEDICAL CENTER | Age: 47
End: 2022-03-01
Attending: INTERNAL MEDICINE
Payer: COMMERCIAL

## 2022-03-01 DIAGNOSIS — M54.9 BACK PAIN, UNSPECIFIED BACK LOCATION, UNSPECIFIED BACK PAIN LATERALITY, UNSPECIFIED CHRONICITY: ICD-10-CM

## 2022-03-01 PROCEDURE — 97140 MANUAL THERAPY 1/> REGIONS: CPT

## 2022-03-01 PROCEDURE — 97110 THERAPEUTIC EXERCISES: CPT

## 2022-03-01 NOTE — OP THERAPY DAILY TREATMENT
"  Outpatient Physical Therapy  DAILY TREATMENT     [facility] Outpatient Physical Therapy  [address]  [place] [address]  Phone:  [phone]  Fax:  [phone]    Date: 03/01/2022    Patient: Gurmeet Mercer  YOB: 1975  MRN: [id number]     Time Calculation    Start time: 0947  Stop time: 1030 Time Calculation (min): 43 minutes         Chief Complaint: Back Problem    Visit #: 2    SUBJECTIVE:  Pt reports still feeling instability in the low back. Felt a little better after mechanical traction     OBJECTIVE:  Current objective measures: Donta Test: 2\" R side; 1\" L side          Therapeutic Exercises (CPT 79909):     1. Recumbent bike , lvl 5 x 5 min    2. Bird dogs, x10, (added to HEP)    3. Side plank on knees, 2x30 sec each side, (added to HEP)    4. Supine ab isometric w/ SB, 10x5\" hold, (added to HEP)    5. Kneeling hip flexor stretch, x30 sec each, (added to HEP)    6. Kneeling adductor stretch, x30 sec each, (added to HEP)      Therapeutic Exercise Summary: Pt given handout for HEP    Therapeutic Treatments and Modalities:     2. Manual Therapy (CPT 71879), manual traction w/ SB, PAs to t-spine    Time-based treatments/modalities:    Physical Therapy Timed Treatment Charges  Manual therapy minutes (CPT 62652): 10 minutes  Therapeutic exercise minutes (CPT 41090): 33 minutes      ASSESSMENT:   Response to treatment: Progressed exercises focusing on strengthening and stability of core muscles/deep back extensors. Pt tolerated exercises well and without pain. Unable to perform deadbugs d/t feelings on instability in low back during LE extension. Pt provided handout for HEP.    PLAN/RECOMMENDATIONS:   Plan for treatment: therapy treatment to continue next visit.  Planned interventions for next visit: continue with current treatment. Incorporate deadlift/goblet squat       "

## 2022-03-07 ENCOUNTER — APPOINTMENT (OUTPATIENT)
Dept: PHYSICAL THERAPY | Facility: MEDICAL CENTER | Age: 47
End: 2022-03-07
Attending: INTERNAL MEDICINE
Payer: COMMERCIAL

## 2022-03-08 ENCOUNTER — PHYSICAL THERAPY (OUTPATIENT)
Dept: PHYSICAL THERAPY | Facility: MEDICAL CENTER | Age: 47
End: 2022-03-08
Attending: INTERNAL MEDICINE
Payer: COMMERCIAL

## 2022-03-08 DIAGNOSIS — M54.9 BACK PAIN, UNSPECIFIED BACK LOCATION, UNSPECIFIED BACK PAIN LATERALITY, UNSPECIFIED CHRONICITY: ICD-10-CM

## 2022-03-08 PROCEDURE — 97014 ELECTRIC STIMULATION THERAPY: CPT

## 2022-03-08 PROCEDURE — 97110 THERAPEUTIC EXERCISES: CPT

## 2022-03-08 NOTE — OP THERAPY DAILY TREATMENT
Outpatient Physical Therapy  DAILY TREATMENT     [facility] Outpatient Physical Therapy  [address]  [place] [address]  Phone:  [phone]  Fax:  [phone]    Date: 03/08/2022    Patient: Gurmeet Mercer  YOB: 1975  MRN: [id number]     Time Calculation    Start time: 0947  Stop time: 1042 Time Calculation (min): 55 minutes         Chief Complaint: Back Problem    Visit #: 3    SUBJECTIVE:  Pt reporting felt heat in thoracic spine but persisted for an hour only. Reports back was sore after working for three days.     OBJECTIVE:  Current objective measures:   Donta Test: Pos for tightness B hip flexors and rectus femoris         Therapeutic Exercises (CPT 87995):     1. Recumbent bike , lvl 5 x 5 min    2. Bird dogs, x3, twisting of pelvis with difficulty correcting; discontinued from current hep    3. Side plank on knees, NT    4. Supine ab isometric w/ SB, NT    5. Kneeling hip flexor stretch, modified to donta position    6. Kneeling adductor stretch, NT    7. TrA draw in+marching , 3x10, fatigue; pelvic tilt-increased with RLE lift; improved with VC's; hep    8. HS ball rolls+TrA draw in, 30x    9. Superman on swissball , 4x10 sec, VC's for neutral neck and spine      Therapeutic Exercise Summary: Pt given handout for HEP    Therapeutic Treatments and Modalities:     1. E Stim Unattended (CPT 63129), IFC and mhp to l/s x 15 min     Time-based treatments/modalities:    Physical Therapy Timed Treatment Charges  Therapeutic exercise minutes (CPT 44499): 40 minutes       ASSESSMENT:   Response to treatment:   Modified stretching due to pt c/o pain with kneeling. Discontinued bird dogs due to pt inability to control core with dynamic movements; modified to supine with TrA draw-in with fatigue and VC's to maintain level pelvis with improvement.    Progressed exercises focusing on strengthening and stability of core muscles/deep back extensors. Pt  tolerated exercises well and without pain. Unable to perform deadbugs d/t feelings on instability in low back during LE extension. Pt provided handout for HEP.      PLAN/RECOMMENDATIONS:   Plan for treatment: therapy treatment to continue next visit.  Planned interventions for next visit: continue with current treatment.

## 2022-03-10 ENCOUNTER — APPOINTMENT (OUTPATIENT)
Dept: PHYSICAL THERAPY | Facility: MEDICAL CENTER | Age: 47
End: 2022-03-10
Attending: INTERNAL MEDICINE
Payer: COMMERCIAL

## 2022-03-14 ENCOUNTER — APPOINTMENT (OUTPATIENT)
Dept: PHYSICAL THERAPY | Facility: MEDICAL CENTER | Age: 47
End: 2022-03-14
Attending: INTERNAL MEDICINE
Payer: COMMERCIAL

## 2022-03-14 ENCOUNTER — OFFICE VISIT (OUTPATIENT)
Dept: MEDICAL GROUP | Facility: MEDICAL CENTER | Age: 47
End: 2022-03-14
Payer: COMMERCIAL

## 2022-03-14 VITALS
DIASTOLIC BLOOD PRESSURE: 76 MMHG | TEMPERATURE: 97.3 F | OXYGEN SATURATION: 95 % | HEIGHT: 73 IN | WEIGHT: 222 LBS | SYSTOLIC BLOOD PRESSURE: 128 MMHG | HEART RATE: 83 BPM | BODY MASS INDEX: 29.42 KG/M2

## 2022-03-14 DIAGNOSIS — M54.50 LOW BACK PAIN, UNSPECIFIED BACK PAIN LATERALITY, UNSPECIFIED CHRONICITY, UNSPECIFIED WHETHER SCIATICA PRESENT: ICD-10-CM

## 2022-03-14 DIAGNOSIS — K21.9 GASTROESOPHAGEAL REFLUX DISEASE, UNSPECIFIED WHETHER ESOPHAGITIS PRESENT: ICD-10-CM

## 2022-03-14 PROCEDURE — 99213 OFFICE O/P EST LOW 20 MIN: CPT | Performed by: INTERNAL MEDICINE

## 2022-03-14 ASSESSMENT — FIBROSIS 4 INDEX: FIB4 SCORE: 0.54

## 2022-03-14 NOTE — PROGRESS NOTES
Subjective     Gurmeet Mercer is a 46 y.o. 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 Sees PT once per week, and has been doing home exercises. No significant flare up at work but progress in PT has been slow and he has been doing the home exercises.  He does always have some low back pain or discomfort occasionally some mid back discomfort as well, no radiation down his legs.  He has not had to take the zanaflex muscle relaxant yet, does use heating pad and TENS unit, also takes motrin 200 mg two at a time with no stomach upset with the Motrin. Does have occasional gerd at times at night or after certain meals especially those foods noted to cause heartburn symptoms. Has tried prilosec in the past with benefit. No associated dysphagia or odynophagia normally although a few months ago did have occasions of food sticking with swallowing but that resolved, no nausea or emesis. Exercising four days per week, will do core training and weight training and bike riding going 30 to 60 minutes and that does not adversely impact his back.      Current Outpatient Medications   Medication Sig Dispense Refill   • tizanidine (ZANAFLEX) 4 MG Tab Take 1 Tablet by mouth 3 times a day as needed (muscle spasm and back pain). 30 Tablet 3   • triamcinolone (NASACORT ALLERGY 24HR) 55 MCG/ACT nasal inhaler Administer 2 Sprays into affected nostril(S) every day. 16.9 mL 3   • acetaminophen (TYLENOL) 500 MG Tab Take 500-1,000 mg by mouth every 6 hours as needed.     • Ibuprofen (ADVIL) 200 MG Cap Take  by mouth.       No current facility-administered medications for this visit.     Allergic rhinitis  8/1/17 start atrovent nasal, has tried astelin and nasonex previously      Dyslipidemia  9/11 chol 201,trig 179,hdl 37,ldl 128 start fish oil 2 bid  8/12 chol 183,trig 165,hdl  36,ldl 114  9/14/13 chol 208,trig 308,hdl 37,ldl 109  9/4/14 chol 210,trig 329,hdl 37,ldl 107  6/14/16 chol 220,trig 315,hdl 40,ldl 117  9/19/17 chol 210,trig 240,hdl 42,ldl 120  9/18/18 chol 204,trig 369,hdl 40,ldl 90  8/12/19 chol 215,trig 167,hdl 42,ldl 140    Low back pain  12/15/21 most recent flareup started by bending over and putting on his shoe  1/22/22 referral physical therapy, consider imaging, continue over-the-counter ibuprofen, trial of zanaflex, no need for imaging now, will complete FMLA  3/14/22 going to PT, on motrin 2 tabs prn, has not tried zanaflex yet, will try to compare the benefit with motrin  Tried skelaxin and flexeril previously    Non-intractable tension headache  8/1/17 tension headache, occasional blurry vision left eye, referral back to ophthalmology, trial of nortriptyline 10 mg question tension headache, atypical migraine  12/4/17 tried pamelor no benefit, trial of acupuncture, ophthalmology evaluation pending   3/15/18 dr.hale ophthalmology note, scintillating scotomata, likely complicated migraine recommend different of lactic agent, referral to neurology , trial of xiidra drops for dry eyes, severe amblyopia  11/30/18  neurology note headache imaging negative, no evidence of autoimmune disease although markers will be checked, associated vision changes, will schedule EEG  4/29/19 EEG video negative  5/7/19 neurology note daily headache, no criteria for migraine, trial of anaprox 550 mg as needed up to 3 times daily, visual changes no clear etiology, very infrequent, could be migrainous epiphenomena, do not believe medication is worthwhile, consider ambulatory EEG in the future  9/27/21 left-sided facial tingling diagnosed with trigeminal neuralgia     Preventative health  9/21/12 tdap  7/5/16 apnea link negative  6/7/19 colonoscopy per [facility] normal mucosa 2 mm cecum polyp pathology normal mucosa  8/12/19 vit d 29 start 2000 units   Refractive  "amblyopia  3/1/16  eye exam, also dry eyes  1/10/21 covid pfizer second  9/23/21 flu   9/29/21 covid pfizer booster     vision changes  4/29/19 EEG video negative  5/7/19 neurology note daily headache, no criteria for migraine, trial of anaprox 550 mg as needed up to 3 times daily, visual changes no clear etiology, very infrequent, could be migrainous epiphenomena, do not believe medication is worthwhile, consider ambulatory EEG in the future     wrist pain  8/25/20 occupational medicine note right wrist burning sensation, x-ray ordered  8/25/20 x-ray right wrist negative  11/4/20 occupational medicine note MRI ordered, consider physiatry evaluation  11/19/20 physical therapy note  11/23/20 MRI right wrist without, dorsal radial ulnar strut and proximal triangular fibrocartilage tears with associated ganglion cyst 12 x 8 x 3 mm under the extensor carpi ulnaris, partial scapholunate ligament tear volar surface with 11 mm volar radial ganglion cyst  12/2/20 occupational medicine note referral to hand surgery  1/18/21  orthopedic note injection [facility] tendon sheath performed, continue work without limitations, follow-up 4 weeks  2/22/21  orthopedic note follow-up [facility] tendon sheath injection 1 month ago, wrist is doing well, close workmen's compensation case, could return to full duty       Patient Active Problem List   Diagnosis   • Family history of brain aneurysm   • Preventative health care   • Dyslipidemia   • Erectile dysfunction   • Refractive amblyopia   • Allergic rhinitis   • Nonintractable episodic headache   • Visual changes   • GERD (gastroesophageal reflux disease)   • Wrist pain   • Low back pain        Patient Care Team:  Dheeraj Burr M.D. as PCP - General (Internal Medicine)    ROS           Objective     /76 (BP Location: Right arm, Patient Position: Sitting, BP Cuff Size: Adult)   Pulse 83   Temp 36.3 °C (97.3 °F)   Ht 1.854 m (6' 1\")   Wt 101 kg (222 lb)   SpO2 " 95%   BMI 29.29 kg/m²      Physical Exam  Vitals and nursing note reviewed.   Constitutional:       Appearance: Normal appearance.   HENT:      Head: Normocephalic and atraumatic.      Right Ear: External ear normal.      Left Ear: External ear normal.   Eyes:      Conjunctiva/sclera: Conjunctivae normal.   Cardiovascular:      Rate and Rhythm: Normal rate and regular rhythm.   Pulmonary:      Effort: Pulmonary effort is normal.      Breath sounds: Normal breath sounds.   Abdominal:      General: There is no distension.      Palpations: Abdomen is soft. There is no mass.      Tenderness: There is no abdominal tenderness. There is no rebound.   Musculoskeletal:         General: No swelling or tenderness.   Skin:     General: Skin is warm.      Coloration: Skin is not jaundiced.      Findings: No bruising.   Neurological:      General: No focal deficit present.      Mental Status: He is alert.   Psychiatric:         Mood and Affect: Mood normal.         Behavior: Behavior normal.       No spinal tenderness    Assessment & Plan        Assessment  #1 chronic low back pain, followed by physical therapy, has had to decrease the intensity of his physical therapy work on stress the therapist thought he was not quite ready yet to advance his exercises, he has been diligently working on exercises, stretching and weight training and riding a bike being careful to not overdo things, taking Motrin up to 2 tablets at a time a few days a week with some benefit, has not tried Zanaflex yet    #2 reflux with certain foods and at night, has tried Pepcid in the past with some benefit, Motrin does not seem to upset his stomach, no current dysphagia, odynophagia, nausea, vomiting      Plan  #1 recommend continue physical therapy, continued exercise, monitoring his back with regards to his exercise intensity, his therapist mentioned perhaps even looking at a different type of work environment, but that would mean the 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

## 2022-03-15 ENCOUNTER — APPOINTMENT (OUTPATIENT)
Dept: PHYSICAL THERAPY | Facility: MEDICAL CENTER | Age: 47
End: 2022-03-15
Attending: INTERNAL MEDICINE
Payer: COMMERCIAL

## 2022-03-15 NOTE — OP THERAPY DAILY TREATMENT
Outpatient Physical Therapy  DAILY TREATMENT     [facility]  [address]  [place] [id number]  Phone:  [phone]  Fax:  [phone]    Date: 03/15/2022    Patient: Gurmeet Mercer  YOB: 1975  MRN: [id number]     Time Calculation                   Chief Complaint: No chief complaint on file.    Visit #: 4    SUBJECTIVE:  Pt reporting felt heat in thoracic spine but persisted for an hour only. Reports back was sore after working for three days.     OBJECTIVE:  Current objective measures:   Donta Test: Pos for tightness B hip flexors and rectus femoris         Therapeutic Exercises (CPT 18029):     1. Recumbent bike , lvl 5 x 5 min    2. Bird dogs, x3, twisting of pelvis with difficulty correcting; discontinued from current hep    3. Side plank on knees, NT    4. Supine ab isometric w/ SB, NT    5. Kneeling hip flexor stretch, modified to donta position    6. Kneeling adductor stretch, NT    7. TrA draw in+marching , 3x10, fatigue; pelvic tilt-increased with RLE lift; improved with VC's; hep    8. HS ball rolls+TrA draw in, 30x    9. Superman on swissball , 4x10 sec, VC's for neutral neck and spine      Therapeutic Exercise Summary: Pt given handout for HEP    Therapeutic Treatments and Modalities:     1. E Stim Unattended (CPT 27416), IFC and mhp to l/s x 15 min     Time-based treatments/modalities:            ASSESSMENT:   Response to treatment:   Modified stretching due to pt c/o pain with kneeling. Discontinued bird dogs due to pt inability to control core with dynamic movements; modified to supine with TrA draw-in with fatigue and VC's to maintain level pelvis with improvement.    Progressed exercises focusing on strengthening and stability of core muscles/deep back extensors. Pt tolerated exercises well and without pain. Unable to perform deadbugs d/t feelings on instability in low back during LE extension. Pt  provided handout for HEP.      PLAN/RECOMMENDATIONS:   Plan for treatment: therapy treatment to continue next visit.  Planned interventions for next visit: continue with current treatment.

## 2022-03-17 ENCOUNTER — APPOINTMENT (OUTPATIENT)
Dept: PHYSICAL THERAPY | Facility: MEDICAL CENTER | Age: 47
End: 2022-03-17
Attending: INTERNAL MEDICINE
Payer: COMMERCIAL

## 2022-03-22 ENCOUNTER — PHYSICAL THERAPY (OUTPATIENT)
Dept: PHYSICAL THERAPY | Facility: MEDICAL CENTER | Age: 47
End: 2022-03-22
Attending: INTERNAL MEDICINE
Payer: COMMERCIAL

## 2022-03-22 DIAGNOSIS — M54.9 BACK PAIN, UNSPECIFIED BACK LOCATION, UNSPECIFIED BACK PAIN LATERALITY, UNSPECIFIED CHRONICITY: ICD-10-CM

## 2022-03-22 PROCEDURE — 97012 MECHANICAL TRACTION THERAPY: CPT

## 2022-03-22 PROCEDURE — 97110 THERAPEUTIC EXERCISES: CPT

## 2022-03-22 NOTE — OP THERAPY DAILY TREATMENT
Outpatient Physical Therapy  DAILY TREATMENT     [facility] Outpatient Physical Therapy  [address]  [place] [address]  Phone:  [phone]  Fax:  [phone]    Date: 03/22/2022    Patient: Gurmeet Mercer  YOB: 1975  MRN: [id number]     Time Calculation    Start time: 1000  Stop time: 1053 Time Calculation (min): 53 minutes         Chief Complaint: Back Problem    Visit #: 4    SUBJECTIVE:  Patient reports that he had increase in symptoms last week but used inversion table multiple times, which seemed to help. Nothing to report after MD visit on 3/14. Work continues to be an irritant on back.     OBJECTIVE:  Current objective measures:           Therapeutic Exercises (CPT 96902):     1. NuStep , lvl 5 x 5 min    2. TrA + march , x 15     3. TrA + bridge , 2 x 15, Added to HEP     4. TrA + SLR, 2 x 15 , Added to HEP     5. Quadruped with alt. UEs , 2 x 15, Added to HEP     6. Seated TrA on ball + Paloff press , x 15 B , GTB    8. Donta stretch , x 2 minutes B     9. Seated HSS , x 2 minutes B     10. LTR , 10 x 10 seconds B     11. Education on TrA importance     12. Review of lifting mechanics and discussion about work set up       Therapeutic Exercise Summary: Pt given handout for HEP    Therapeutic Treatments and Modalities:     1. Mechanical Traction (CPT 13265), Lumbar traction 110/90#, 60/20 with MHP in supine x 15 minutes     Time-based treatments/modalities:    Physical Therapy Timed Treatment Charges  Therapeutic exercise minutes (CPT 61191): 38 minutes      ASSESSMENT:   Response to treatment: Patient demonstrated fatigue with TrA activation (L>R side of trunk), although demonstrated fair endurance as able to maintain contraction while completing all 15 repetitions. Patient reported that he felt very stiff after traction and would like to continue with IFC in the future.      PLAN/RECOMMENDATIONS:   Plan for treatment: therapy treatment  to continue next visit.  Planned interventions for next visit: continue with current treatment.

## 2022-03-23 ENCOUNTER — PHARMACY VISIT (OUTPATIENT)
Dept: PHARMACY | Facility: MEDICAL CENTER | Age: 47
End: 2022-03-23
Payer: COMMERCIAL

## 2022-03-23 ENCOUNTER — OFFICE VISIT (OUTPATIENT)
Dept: URGENT CARE | Facility: PHYSICIAN GROUP | Age: 47
End: 2022-03-23
Payer: COMMERCIAL

## 2022-03-23 VITALS
HEIGHT: 73 IN | WEIGHT: 220 LBS | OXYGEN SATURATION: 97 % | SYSTOLIC BLOOD PRESSURE: 124 MMHG | TEMPERATURE: 97.2 F | BODY MASS INDEX: 29.16 KG/M2 | RESPIRATION RATE: 16 BRPM | DIASTOLIC BLOOD PRESSURE: 82 MMHG | HEART RATE: 63 BPM

## 2022-03-23 DIAGNOSIS — H65.93 MIDDLE EAR EFFUSION, BILATERAL: ICD-10-CM

## 2022-03-23 DIAGNOSIS — R55 SYNCOPE, UNSPECIFIED SYNCOPE TYPE: ICD-10-CM

## 2022-03-23 DIAGNOSIS — H81.10 BENIGN PAROXYSMAL POSITIONAL VERTIGO, UNSPECIFIED LATERALITY: ICD-10-CM

## 2022-03-23 DIAGNOSIS — R42 DIZZINESS: ICD-10-CM

## 2022-03-23 LAB — GLUCOSE BLD-MCNC: 97 MG/DL (ref 70–100)

## 2022-03-23 PROCEDURE — 82962 GLUCOSE BLOOD TEST: CPT | Performed by: NURSE PRACTITIONER

## 2022-03-23 PROCEDURE — 99214 OFFICE O/P EST MOD 30 MIN: CPT | Performed by: NURSE PRACTITIONER

## 2022-03-23 PROCEDURE — RXMED WILLOW AMBULATORY MEDICATION CHARGE: Performed by: NURSE PRACTITIONER

## 2022-03-23 PROCEDURE — 93000 ELECTROCARDIOGRAM COMPLETE: CPT | Performed by: NURSE PRACTITIONER

## 2022-03-23 RX ORDER — MECLIZINE HYDROCHLORIDE 25 MG/1
25 TABLET ORAL 3 TIMES DAILY PRN
Qty: 30 TABLET | Refills: 0 | Status: SHIPPED | OUTPATIENT
Start: 2022-03-23 | End: 2023-11-20

## 2022-03-23 ASSESSMENT — ENCOUNTER SYMPTOMS
TREMORS: 0
FEVER: 0
ORTHOPNEA: 0
DIZZINESS: 1
SORE THROAT: 0
WEAKNESS: 0
LOSS OF CONSCIOUSNESS: 0
FOCAL WEAKNESS: 0
VISUAL CHANGE: 0
DIAPHORESIS: 0
PALPITATIONS: 0
SINUS PAIN: 0
SEIZURES: 0
ABDOMINAL PAIN: 0
VERTIGO: 1
NECK PAIN: 0
HEADACHES: 1
VOMITING: 0
NUMBNESS: 0
CHILLS: 0
SENSORY CHANGE: 0
TINGLING: 0
SPEECH CHANGE: 0
MYALGIAS: 0
SHORTNESS OF BREATH: 0
NAUSEA: 0
RESPIRATORY NEGATIVE: 1
CARDIOVASCULAR NEGATIVE: 1
ARTHRALGIAS: 0
EYES NEGATIVE: 1
FATIGUE: 0

## 2022-03-23 ASSESSMENT — FIBROSIS 4 INDEX: FIB4 SCORE: 0.54

## 2022-03-23 NOTE — PROGRESS NOTES
"Subjective     Gurmeet Mercer is a 46 y.o. male who presents with No chief complaint on file.            States episode of dizziness today at work. Unknown cause. States has headache and did not sleep well last night. Denies room spinning but \"does not feel right\". No recent illness or fever. No vision changes or confusion. Took Tylenol once today but did not help. Did eat 3 hrs ago, no history of diabetes.     Dizziness  This is a new problem. The current episode started today. The problem occurs constantly. The problem has been unchanged. Associated symptoms include headaches and vertigo. Pertinent negatives include no abdominal pain, arthralgias, chest pain, chills, congestion, diaphoresis, fatigue, fever, myalgias, nausea, neck pain, numbness, sore throat, visual change, vomiting or weakness. The symptoms are aggravated by standing.       Review of Systems   Constitutional: Negative for chills, diaphoresis, fatigue, fever and malaise/fatigue.   HENT: Negative for congestion, ear pain, sinus pain and sore throat.    Eyes: Negative.    Respiratory: Negative.  Negative for shortness of breath.    Cardiovascular: Negative.  Negative for chest pain, palpitations and orthopnea.   Gastrointestinal: Negative for abdominal pain, nausea and vomiting.   Musculoskeletal: Negative for arthralgias, myalgias and neck pain.   Neurological: Positive for dizziness, vertigo and headaches. Negative for tingling, tremors, sensory change, speech change, focal weakness, seizures, loss of consciousness, weakness and numbness.   All other systems reviewed and are negative.    PMH:  has a past medical history of Plantar fasciitis (6/8/2009) and Tension headache (6/5/2009).  MEDS:   Current Outpatient Medications:   •  meclizine (ANTIVERT) 25 MG Tab, Take 1 Tablet by mouth 3 times a day as needed for Vertigo., Disp: 30 Tablet, Rfl: 0  •  tizanidine (ZANAFLEX) 4 MG Tab, Take 1 Tablet by mouth 3 times a day as needed (muscle " "spasm and back pain)., Disp: 30 Tablet, Rfl: 3  •  triamcinolone (NASACORT ALLERGY 24HR) 55 MCG/ACT nasal inhaler, Administer 2 Sprays into affected nostril(S) every day., Disp: 16.9 mL, Rfl: 3  •  acetaminophen (TYLENOL) 500 MG Tab, Take 500-1,000 mg by mouth every 6 hours as needed., Disp: , Rfl:   •  Ibuprofen 200 MG Cap, Take  by mouth., Disp: , Rfl:   ALLERGIES:   Allergies   Allergen Reactions   • Sulfa Drugs      SURGHX: History reviewed. No pertinent surgical history.  SOCHX:  reports that he has quit smoking. He has a 10.00 pack-year smoking history. He has never used smokeless tobacco. He reports current alcohol use. He reports that he does not use drugs.  FH: Family history was reviewed, no pertinent findings to report           Objective     There were no vitals taken for this visit.   Vitals:    03/23/22 1551 03/23/22 1615 03/23/22 1616 03/23/22 1617   BP: 118/76 124/82 124/82 124/82   Pulse: 65 63 63 63   Resp: 16      Temp: 36.2 °C (97.2 °F)      SpO2: 97%      Weight: 99.8 kg (220 lb)      Height: 1.854 m (6' 1\")          Physical Exam  Vitals reviewed.   Constitutional:       General: He is awake. He is not in acute distress.     Appearance: Normal appearance. He is not ill-appearing, toxic-appearing or diaphoretic.   HENT:      Head: Normocephalic.      Nose: Nose normal.      Mouth/Throat:      Lips: Pink.      Mouth: Mucous membranes are moist.   Eyes:      Extraocular Movements: Extraocular movements intact.      Conjunctiva/sclera: Conjunctivae normal.      Pupils: Pupils are equal, round, and reactive to light.   Cardiovascular:      Rate and Rhythm: Normal rate and regular rhythm.      Heart sounds: Normal heart sounds, S1 normal and S2 normal. Heart sounds not distant. No murmur heard.    No friction rub. No gallop.   Pulmonary:      Effort: Pulmonary effort is normal. No tachypnea, accessory muscle usage or respiratory distress.      Breath sounds: Normal breath sounds and air entry. "   Musculoskeletal:         General: Normal range of motion.      Cervical back: Normal range of motion.   Skin:     General: Skin is warm and dry.   Neurological:      General: No focal deficit present.      Mental Status: He is alert and oriented to person, place, and time.      GCS: GCS eye subscore is 4. GCS verbal subscore is 5. GCS motor subscore is 6.      Cranial Nerves: Cranial nerves are intact.      Sensory: Sensation is intact.      Motor: Motor function is intact.      Coordination: Coordination is intact.      Gait: Gait is intact.   Psychiatric:         Attention and Perception: Attention and perception normal.         Mood and Affect: Mood and affect normal.         Speech: Speech normal.         Behavior: Behavior normal. Behavior is cooperative.         Thought Content: Thought content normal.         Cognition and Memory: Cognition and memory normal.         Judgment: Judgment normal.                             Assessment & Plan        1. Dizziness    - POCT Glucose: 97  - EKG - Clinic Performed: (interpreted by myself, HR 67, NSR, comparable to 6/9/2018)  - Orthostatic Blood Pressure: NEG  - meclizine (ANTIVERT) 25 MG Tab; Take 1 Tablet by mouth 3 times a day as needed for Vertigo.  Dispense: 30 Tablet; Refill: 0    2. Syncope, unspecified syncope type      3. Middle ear effusion, bilateral      4. Benign paroxysmal positional vertigo, unspecified laterality    -Discussed no indication for low blood sugar, abnormal EKG or orthostatic +, most probable middle ear effusion may have caused this, tired from poor sleeping, patient and wife opt to go home and monitor symptoms and try meclizine and if symptoms change or worsen will go to ER  -Increase water intake  -Get rest  -May use over the counter Ibuprofen/Tylenol as needed for any fever, body aches or ear pain  -May take Claritin/Zyrtec/Allegra (chose one) for seasonal allergy symptoms as needed x 1 week, then as needed  -Monitor for increased pain  in ear, muffled hearing, ear discharge, fever-recheck  -Monitor for continued dizziness with nausea/vomiting,severe headache, vision change, weakness, speech change, confusion - need to call 911 immediately, patinet and wife understand this    -Education provided: see above    -Return to urgent care as needed if new or worsening symptoms  -Patient expresses understanding to treatment and plan of care  -Questions were encouraged and answered  -Recommended over the counter meds were written down when requested   -Advised to follow-up with the primary care provider for recheck, reevaluation, and consideration of further management as needed     -Time spent evaluating this patient was at least 30 minutes. This time comprises of the following: preparing for visit/chart review, patient history taken into account pertinent to symptoms, patient counseling/education for needed treatment outpatient, exam/evaluation/treatment plan provided, ordering any appropriate lab/test/procedures/meds, independent interpretation of any clinic testing, medication management with any other listed medications and chart documentation.

## 2022-03-23 NOTE — LETTER
March 23, 2022       Patient: Gurmeet Mercer   YOB: 1975   Date of Visit: 3/23/2022         To Whom It May Concern:    In my medical opinion, I recommend that Gurmeet Mercer be excused from work tomorrow (3/24/2022) and Friday (3/25/2022) due to non-contagious illness.     If you have any questions or concerns, please don't hesitate to call [phone]          Sincerely,          DHRUV Buitrago.  Electronically Signed

## 2022-03-24 ENCOUNTER — APPOINTMENT (OUTPATIENT)
Dept: PHYSICAL THERAPY | Facility: MEDICAL CENTER | Age: 47
End: 2022-03-24
Attending: INTERNAL MEDICINE
Payer: COMMERCIAL

## 2022-03-28 ENCOUNTER — APPOINTMENT (OUTPATIENT)
Dept: PHYSICAL THERAPY | Facility: MEDICAL CENTER | Age: 47
End: 2022-03-28
Attending: INTERNAL MEDICINE
Payer: COMMERCIAL

## 2022-03-29 ENCOUNTER — PHYSICAL THERAPY (OUTPATIENT)
Dept: PHYSICAL THERAPY | Facility: MEDICAL CENTER | Age: 47
End: 2022-03-29
Attending: INTERNAL MEDICINE
Payer: COMMERCIAL

## 2022-03-29 DIAGNOSIS — M54.9 BACK PAIN, UNSPECIFIED BACK LOCATION, UNSPECIFIED BACK PAIN LATERALITY, UNSPECIFIED CHRONICITY: ICD-10-CM

## 2022-03-29 PROCEDURE — 97014 ELECTRIC STIMULATION THERAPY: CPT

## 2022-03-29 PROCEDURE — 97110 THERAPEUTIC EXERCISES: CPT

## 2022-03-29 PROCEDURE — 97140 MANUAL THERAPY 1/> REGIONS: CPT

## 2022-03-29 NOTE — OP THERAPY DAILY TREATMENT
"  Outpatient Physical Therapy  DAILY TREATMENT     [facility] Outpatient Physical Therapy  [address]  [place] [address]  Phone:  [phone]  Fax:  [phone]    Date: 03/29/2022    Patient: Gurmeet Mercer  YOB: 1975  MRN: [id number]     Time Calculation    Start time: 1315  Stop time: 1424 Time Calculation (min): 69 minutes         Chief Complaint: Back Problem    Visit #: 5    SUBJECTIVE:  Patient reporting \"tweaked\" back yesterday but not sure what he did. Notices some intermittent back pain, occasionally with twisting and with work. He is no longer having dizziness.     OBJECTIVE:  Current objective measures:       Slight kyphosis at t/s and lordosis at l/s  Hypomobile CTJ and mid to low t/s    Therapeutic Exercises (CPT 45980):     1. Recumbent bike, x3 min, cardiovascular warm up    2. Review of hep    3. 1/2 FR t/s extensions, open book pec stretch, scissor arms, x10 ea    4. Thoracic rotation in SL , x10 ea, hep; limited to L with LBP    5. Standing t/s mobilization at wall (open book pec stretch and scissor arms), x10 ea, feet forward due to lordosis at l/s      Therapeutic Exercise Summary: Pt given handout for HEP    Therapeutic Treatments and Modalities:     1. E Stim Unattended (CPT 72011), IFC and mhp to l/s x 15 min     2. Manual Therapy (CPT 78391), x8  min     3. Neuromuscular Re-education (CPT 38790), x7 min     Therapeutic Treatment and Modalities Summary: Manual:   CPA to CTJ and T1-T10 gr 3 and 4    Neuro re-ed:   Gil hallpike-B no reproduction of dizziness; no visible nystagmus in room light    Time-based treatments/modalities:    Physical Therapy Timed Treatment Charges  Manual therapy minutes (CPT 20936): 8 minutes  Neuromusc re-ed, balance, coor, post minutes (CPT 90366): 7 minutes  Therapeutic exercise minutes (CPT 31166): 38 minutes     Pain pre-treatment: 1/10 L>R    ASSESSMENT:   Response to treatment:   Review of UC " visit on 3/23/22 for dizziness; pt reporting this has improved.  Pt demonstrating compensations at l/s during UE elevation; hypomobile t/s with manual and loss of t/s rotation to L with slight increase in LBP. Updated hep with addition of t/s mobility ex with additional edu of avoiding compensation of LB lordosis as this may be contributing to pt's LBP.       PLAN/RECOMMENDATIONS:   Plan for treatment: therapy treatment to continue next visit.  Planned interventions for next visit: continue with current treatment.  Assess response to addition of t/s mobility ex

## 2022-03-31 ENCOUNTER — APPOINTMENT (OUTPATIENT)
Dept: PHYSICAL THERAPY | Facility: MEDICAL CENTER | Age: 47
End: 2022-03-31
Attending: INTERNAL MEDICINE
Payer: COMMERCIAL

## 2022-04-04 ENCOUNTER — APPOINTMENT (OUTPATIENT)
Dept: PHYSICAL THERAPY | Facility: MEDICAL CENTER | Age: 47
End: 2022-04-04
Attending: INTERNAL MEDICINE
Payer: COMMERCIAL

## 2022-04-05 ENCOUNTER — OFFICE VISIT (OUTPATIENT)
Dept: MEDICAL GROUP | Facility: MEDICAL CENTER | Age: 47
End: 2022-04-05

## 2022-04-05 ENCOUNTER — HOSPITAL ENCOUNTER (OUTPATIENT)
Dept: LAB | Facility: MEDICAL CENTER | Age: 47
End: 2022-04-05
Attending: INTERNAL MEDICINE
Payer: COMMERCIAL

## 2022-04-05 ENCOUNTER — PHYSICAL THERAPY (OUTPATIENT)
Dept: PHYSICAL THERAPY | Facility: MEDICAL CENTER | Age: 47
End: 2022-04-05
Attending: INTERNAL MEDICINE
Payer: COMMERCIAL

## 2022-04-05 VITALS
BODY MASS INDEX: 29.42 KG/M2 | HEIGHT: 73 IN | DIASTOLIC BLOOD PRESSURE: 82 MMHG | HEART RATE: 67 BPM | WEIGHT: 222 LBS | OXYGEN SATURATION: 95 % | SYSTOLIC BLOOD PRESSURE: 124 MMHG | TEMPERATURE: 97.9 F

## 2022-04-05 DIAGNOSIS — M54.9 BACK PAIN, UNSPECIFIED BACK LOCATION, UNSPECIFIED BACK PAIN LATERALITY, UNSPECIFIED CHRONICITY: ICD-10-CM

## 2022-04-05 DIAGNOSIS — R10.13 DYSPEPSIA: ICD-10-CM

## 2022-04-05 DIAGNOSIS — Z12.5 PROSTATE CANCER SCREENING: ICD-10-CM

## 2022-04-05 DIAGNOSIS — Z00.00 PREVENTATIVE HEALTH CARE: ICD-10-CM

## 2022-04-05 DIAGNOSIS — N52.9 ERECTILE DYSFUNCTION, UNSPECIFIED ERECTILE DYSFUNCTION TYPE: ICD-10-CM

## 2022-04-05 DIAGNOSIS — J30.1 ALLERGIC RHINITIS DUE TO POLLEN, UNSPECIFIED SEASONALITY: ICD-10-CM

## 2022-04-05 PROBLEM — Z87.898 HISTORY OF HEADACHE: Status: ACTIVE | Noted: 2018-11-30

## 2022-04-05 LAB
25(OH)D3 SERPL-MCNC: 25 NG/ML (ref 30–100)
ALBUMIN SERPL BCP-MCNC: 4.4 G/DL (ref 3.2–4.9)
ALBUMIN/GLOB SERPL: 1.3 G/DL
ALP SERPL-CCNC: 75 U/L (ref 30–99)
ALT SERPL-CCNC: 19 U/L (ref 2–50)
ANION GAP SERPL CALC-SCNC: 10 MMOL/L (ref 7–16)
AST SERPL-CCNC: 18 U/L (ref 12–45)
BASOPHILS # BLD AUTO: 1.4 % (ref 0–1.8)
BASOPHILS # BLD: 0.1 K/UL (ref 0–0.12)
BILIRUB SERPL-MCNC: 0.6 MG/DL (ref 0.1–1.5)
BUN SERPL-MCNC: 15 MG/DL (ref 8–22)
CALCIUM SERPL-MCNC: 9.5 MG/DL (ref 8.4–10.2)
CHLORIDE SERPL-SCNC: 103 MMOL/L (ref 96–112)
CHOLEST SERPL-MCNC: 232 MG/DL (ref 100–199)
CO2 SERPL-SCNC: 24 MMOL/L (ref 20–33)
CREAT SERPL-MCNC: 0.97 MG/DL (ref 0.5–1.4)
EOSINOPHIL # BLD AUTO: 0.4 K/UL (ref 0–0.51)
EOSINOPHIL NFR BLD: 5.8 % (ref 0–6.9)
ERYTHROCYTE [DISTWIDTH] IN BLOOD BY AUTOMATED COUNT: 39.8 FL (ref 35.9–50)
EST. AVERAGE GLUCOSE BLD GHB EST-MCNC: 114 MG/DL
FASTING STATUS PATIENT QL REPORTED: NORMAL
GFR SERPLBLD CREATININE-BSD FMLA CKD-EPI: 97 ML/MIN/1.73 M 2
GLOBULIN SER CALC-MCNC: 3.4 G/DL (ref 1.9–3.5)
GLUCOSE SERPL-MCNC: 94 MG/DL (ref 65–99)
HBA1C MFR BLD: 5.6 % (ref 4–5.6)
HCT VFR BLD AUTO: 45.3 % (ref 42–52)
HDLC SERPL-MCNC: 45 MG/DL
HGB BLD-MCNC: 15.4 G/DL (ref 14–18)
IMM GRANULOCYTES # BLD AUTO: 0.01 K/UL (ref 0–0.11)
IMM GRANULOCYTES NFR BLD AUTO: 0.1 % (ref 0–0.9)
LDLC SERPL CALC-MCNC: 157 MG/DL
LYMPHOCYTES # BLD AUTO: 2.77 K/UL (ref 1–4.8)
LYMPHOCYTES NFR BLD: 40 % (ref 22–41)
MCH RBC QN AUTO: 29.9 PG (ref 27–33)
MCHC RBC AUTO-ENTMCNC: 34 G/DL (ref 33.7–35.3)
MCV RBC AUTO: 88 FL (ref 81.4–97.8)
MONOCYTES # BLD AUTO: 0.42 K/UL (ref 0–0.85)
MONOCYTES NFR BLD AUTO: 6.1 % (ref 0–13.4)
NEUTROPHILS # BLD AUTO: 3.22 K/UL (ref 1.82–7.42)
NEUTROPHILS NFR BLD: 46.6 % (ref 44–72)
NRBC # BLD AUTO: 0 K/UL
NRBC BLD-RTO: 0 /100 WBC
PLATELET # BLD AUTO: 250 K/UL (ref 164–446)
PMV BLD AUTO: 9.1 FL (ref 9–12.9)
POTASSIUM SERPL-SCNC: 4.1 MMOL/L (ref 3.6–5.5)
PROT SERPL-MCNC: 7.8 G/DL (ref 6–8.2)
PSA SERPL-MCNC: 0.86 NG/ML (ref 0–4)
RBC # BLD AUTO: 5.15 M/UL (ref 4.7–6.1)
SODIUM SERPL-SCNC: 137 MMOL/L (ref 135–145)
TESTOST SERPL-MCNC: 219 NG/DL (ref 175–781)
TRIGL SERPL-MCNC: 152 MG/DL (ref 0–149)
TSH SERPL DL<=0.005 MIU/L-ACNC: 1.2 UIU/ML (ref 0.38–5.33)
WBC # BLD AUTO: 6.9 K/UL (ref 4.8–10.8)

## 2022-04-05 PROCEDURE — 97110 THERAPEUTIC EXERCISES: CPT

## 2022-04-05 PROCEDURE — 80053 COMPREHEN METABOLIC PANEL: CPT

## 2022-04-05 PROCEDURE — 84443 ASSAY THYROID STIM HORMONE: CPT

## 2022-04-05 PROCEDURE — 82306 VITAMIN D 25 HYDROXY: CPT

## 2022-04-05 PROCEDURE — 83036 HEMOGLOBIN GLYCOSYLATED A1C: CPT

## 2022-04-05 PROCEDURE — 82784 ASSAY IGA/IGD/IGG/IGM EACH: CPT

## 2022-04-05 PROCEDURE — 84402 ASSAY OF FREE TESTOSTERONE: CPT

## 2022-04-05 PROCEDURE — 84403 ASSAY OF TOTAL TESTOSTERONE: CPT

## 2022-04-05 PROCEDURE — 99213 OFFICE O/P EST LOW 20 MIN: CPT | Performed by: INTERNAL MEDICINE

## 2022-04-05 PROCEDURE — 84153 ASSAY OF PSA TOTAL: CPT

## 2022-04-05 PROCEDURE — 36415 COLL VENOUS BLD VENIPUNCTURE: CPT

## 2022-04-05 PROCEDURE — 80061 LIPID PANEL: CPT

## 2022-04-05 PROCEDURE — 85025 COMPLETE CBC W/AUTO DIFF WBC: CPT

## 2022-04-05 PROCEDURE — 86364 TISS TRNSGLTMNASE EA IG CLAS: CPT

## 2022-04-05 RX ORDER — IPRATROPIUM BROMIDE 21 UG/1
2 SPRAY, METERED NASAL EVERY 12 HOURS
Qty: 30 ML | Refills: 3 | Status: SHIPPED | OUTPATIENT
Start: 2022-04-05

## 2022-04-05 RX ORDER — SILDENAFIL CITRATE 20 MG/1
TABLET ORAL
Qty: 30 TABLET | Refills: 5 | Status: SHIPPED | OUTPATIENT
Start: 2022-04-05

## 2022-04-05 ASSESSMENT — FIBROSIS 4 INDEX: FIB4 SCORE: 0.54

## 2022-04-05 NOTE — OP THERAPY DAILY TREATMENT
Outpatient Physical Therapy  DAILY TREATMENT     [facility] Outpatient Physical Therapy  [address]  [place] [address]  Phone:  [phone]  Fax:  [phone]    Date: 04/05/2022    Patient: Gurmeet Mercer  YOB: 1975  MRN: [id number]     Time Calculation    Start time: 1115  Stop time: 1157 Time Calculation (min): 42 minutes         Chief Complaint: Back Problem    Visit #: 6    SUBJECTIVE:  Patient reporting decreased soreness at back and feels that he is improving.     OBJECTIVE:  Current objective measures:       Slight kyphosis at t/s and lordosis at l/s  Hypomobile CTJ and mid to low t/s  Difficulty maintaining level pelvis with SL bridge    Therapeutic Exercises (CPT 43142):     1. Recumbent bike, x3 min, cardiovascular warm up    2. Review of hep    3. T/s extensions, open book pec stretch, scissor arms, x10 ea, reviewed at [place]     4. Thoracic rotation in SL , x10 ea, hep; limited to L with LBP    5. Standing t/s mobilization at wall (open book pec stretch and scissor arms), x10 ea, feet forward due to lordosis at l/s; reviewed     7. TrA brace+bridge, x10, attempted SL bridging but inability to complete without pelvic    8. TrA brace+SLR, x10 ea, reviewed    9. TrA brace+march, x10 , reviewed      Time-based treatments/modalities:    Physical Therapy Timed Treatment Charges  Therapeutic exercise minutes (CPT 30455): 42 minutes     Pain pre-treatment: 1/10      ASSESSMENT:   Response to treatment:   Pt reporting 50% improvement overall since initiation of therapy. Has improving body awareness with decreased compensation of l/s. Inability to complete SL bridge without sig pelvic drop; will progress this at a later date. Pt will continue indep with hep due to good carryover and follow up in two weeks for reassessment for PT needs and changes in strength.    Pt wishing to skip modalities today.      PLAN/RECOMMENDATIONS:   Plan for treatment:  therapy treatment to continue next visit.  Planned interventions for next visit: continue with current treatment.  Follow up in 2 weeks; progress note

## 2022-04-05 NOTE — PROGRESS NOTES
Subjective     Gurmeet Mercer is a 46 y.o. male who presents with follow up vertigo      HPI       Had onset of vertigo onset two weeks ago at work while sitting at a desk leaning his head backwards, that precipitated a room spinning sensation associated with some slight nausea, no associated hearing loss or tinnitus at the time. Initially the room spinning sensation was sporadic and did not seem to be related to head position.  Patient took meclizine and that helped alleviate the symptoms or at least the symptoms were less intense. The symptoms subsequently improved the next two days and resolved.   No associated sore throat, cough, or ear pain, no fever. Has had some allergy symptoms and used nasal spray which helped.  Zyrtec did apparently cause some mood changes as he has tried this before.  Low back pain has been stable, continues going to physical therapy once per week.  Continues home exercises, and avoids heavy lifting at work.  Does have erectile dysfunction, had been on Cialis in the past from urology 5 mg as needed, could not tolerate the brand name Viagra in the past.         Current Outpatient Medications   Medication Sig Dispense Refill   • meclizine (ANTIVERT) 25 MG Tab Take 1 Tablet by mouth 3 times a day as needed for Vertigo. 30 Tablet 0   • tizanidine (ZANAFLEX) 4 MG Tab Take 1 Tablet by mouth 3 times a day as needed (muscle spasm and back pain). 30 Tablet 3   • triamcinolone (NASACORT ALLERGY 24HR) 55 MCG/ACT nasal inhaler Administer 2 Sprays into affected nostril(S) every day. 16.9 mL 3   • acetaminophen (TYLENOL) 500 MG Tab Take 500-1,000 mg by mouth every 6 hours as needed.     • Ibuprofen 200 MG Cap Take  by mouth.       No current facility-administered medications for this visit.                     Allergic rhinitis  8/1/17 start atrovent nasal, has tried astelin and nasonex previously      Dyslipidemia  9/11 chol 201,trig 179,hdl 37,ldl 128 start fish oil 2 bid  8/12 chol  183,trig 165,hdl 36,ldl 114  9/14/13 chol 208,trig 308,hdl 37,ldl 109  9/4/14 chol 210,trig 329,hdl 37,ldl 107  6/14/16 chol 220,trig 315,hdl 40,ldl 117  9/19/17 chol 210,trig 240,hdl 42,ldl 120  9/18/18 chol 204,trig 369,hdl 40,ldl 90  8/12/19 chol 215,trig 167,hdl 42,ldl 140     Low back pain  12/15/21 most recent flareup started by bending over and putting on his shoe  1/22/22 referral physical therapy, consider imaging, continue over-the-counter ibuprofen, trial of zanaflex, no need for imaging now, will complete FMLA  3/14/22 going to PT, on motrin 2 tabs prn, has not tried zanaflex yet, will try to compare the benefit with motrin  Tried skelaxin and flexeril previously     history headache  8/1/17 tension headache, occasional blurry vision left eye, referral back to ophthalmology, trial of nortriptyline 10 mg question tension headache, atypical migraine  12/4/17 tried pamelor no benefit, trial of acupuncture, ophthalmology evaluation pending   3/15/18 dr.hale ophthalmology note, scintillating scotomata, likely complicated migraine recommend different of lactic agent, referral to neurology , trial of xiidra drops for dry eyes, severe amblyopia  11/30/18  neurology note headache imaging negative, no evidence of autoimmune disease although markers will be checked, associated vision changes, will schedule EEG  4/29/19 EEG video negative  5/7/19 neurology note daily headache, no criteria for migraine, trial of anaprox 550 mg as needed up to 3 times daily, visual changes no clear etiology, very infrequent, could be migrainous epiphenomena, do not believe medication is worthwhile, consider ambulatory EEG in the future  9/27/21 left-sided facial tingling diagnosed with trigeminal neuralgia     Preventative health  9/21/12 tdap  7/5/16 apnea link negative  6/7/19 colonoscopy per [facility] normal mucosa 2 mm cecum polyp pathology normal mucosa  8/12/19 vit d 29 start 2000 units   Refractive  amblyopia  3/1/16  eye exam, also dry eyes  1/10/21 covid pfizer second  9/23/21 flu   9/29/21 covid pfizer booster     History vision changes  4/29/19 EEG video negative  5/7/19 neurology note daily headache, no criteria for migraine, trial of anaprox 550 mg as needed up to 3 times daily, visual changes no clear etiology, very infrequent, could be migrainous epiphenomena, do not believe medication is worthwhile, consider ambulatory EEG in the future     History wrist pain  8/25/20 occupational medicine note right wrist burning sensation, x-ray ordered  8/25/20 x-ray right wrist negative  11/4/20 occupational medicine note MRI ordered, consider physiatry evaluation  11/19/20 physical therapy note  11/23/20 MRI right wrist without, dorsal radial ulnar strut and proximal triangular fibrocartilage tears with associated ganglion cyst 12 x 8 x 3 mm under the extensor carpi ulnaris, partial scapholunate ligament tear volar surface with 11 mm volar radial ganglion cyst  12/2/20 occupational medicine note referral to hand surgery  1/18/21  orthopedic note injection [employer] tendon sheath performed, continue work without limitations, follow-up 4 weeks  2/22/21  orthopedic note follow-up [employer] tendon sheath injection 1 month ago, wrist is doing well, close workmen's compensation case, could return to full duty          Patient Active Problem List   Diagnosis   • Family history of brain aneurysm   • Preventative health care   • Dyslipidemia   • Erectile dysfunction   • Refractive amblyopia   • Allergic rhinitis   • Nonintractable episodic headache   • Visual changes   • GERD (gastroesophageal reflux disease)   • Wrist pain   • Low back pain       ROS           Objective          Physical Exam  Vitals and nursing note reviewed.   Constitutional:       Appearance: Normal appearance.   HENT:      Head: Normocephalic and atraumatic.      Right Ear: External ear normal.      Left Ear: External ear normal.   Eyes:       Conjunctiva/sclera: Conjunctivae normal.   Cardiovascular:      Rate and Rhythm: Normal rate and regular rhythm.      Heart sounds: Normal heart sounds.   Pulmonary:      Effort: Pulmonary effort is normal.      Breath sounds: Normal breath sounds.   Abdominal:      General: There is no distension.   Musculoskeletal:      Cervical back: Neck supple.   Neurological:      General: No focal deficit present.      Mental Status: He is alert.   Psychiatric:         Mood and Affect: Mood normal.         Behavior: Behavior normal.         Assessment & Plan        Assessment  #1 episode of vertigo, does not quite have the classic definition of positional vertigo although appears to have spontaneously resolved after a few days, no associated hearing loss or tinnitus    #2 chronic low back pain continues to use physical therapy once a week and does stretching exercise at home      #3 erectile dysfunction, possible component of lower urinary tract symptoms, had seen urology previously and tried Cialis which had been beneficial, brand-name Viagra did cause some side effects previously    #4 allergic rhinitis, has tried Astelin previously but that does have a lingering taste in his mouth when using that, has tried Zyrtec previously but caused mood changes      Plan  #1 trial atrovent nasal prescription to pharmacy    #2 trial allegra instead of Zyrtec    #3 labs ordered previously he will have that done    #4 continue PT and home back exercise, avoid heavy lifting at work    #5  Generic Viagra sildenafil prescription sent to his pharmacy 20 mg, take 1 to 5 tablets 1 hour prior to activity maximum 5/day monitor for headache, flushing, lightheadedness, if not effective consider Cialis 5 mg which may not be generic on his plan

## 2022-04-06 ENCOUNTER — TELEPHONE (OUTPATIENT)
Dept: MEDICAL GROUP | Facility: MEDICAL CENTER | Age: 47
End: 2022-04-06
Payer: COMMERCIAL

## 2022-04-06 LAB — IGA SERPL-MCNC: 236 MG/DL (ref 68–408)

## 2022-04-06 NOTE — TELEPHONE ENCOUNTER
Need PAR   (1) sildenafil 20 mg tab take between one to five tabs one hour prior to activity as needed, maximum five per day  (2) #30 per 30 days  (3) diagnosis: erectile dysfunction  (4) ICD 10: N52.9  (5) comments: has tried cialis and viagra previously

## 2022-04-07 ENCOUNTER — APPOINTMENT (OUTPATIENT)
Dept: PHYSICAL THERAPY | Facility: MEDICAL CENTER | Age: 47
End: 2022-04-07
Attending: INTERNAL MEDICINE
Payer: COMMERCIAL

## 2022-04-07 LAB — TTG IGA SER IA-ACNC: <2 U/ML (ref 0–3)

## 2022-04-08 LAB — TESTOST FREE SERPL-MCNC: 63 PG/ML (ref 47–244)

## 2022-04-19 ENCOUNTER — EH NON-PROVIDER (OUTPATIENT)
Dept: OCCUPATIONAL MEDICINE | Facility: CLINIC | Age: 47
End: 2022-04-19

## 2022-04-19 DIAGNOSIS — Z23 ENCOUNTER FOR IMMUNIZATION: ICD-10-CM

## 2022-04-19 PROCEDURE — 90746 HEPB VACCINE 3 DOSE ADULT IM: CPT | Performed by: NURSE PRACTITIONER

## 2022-04-29 PROCEDURE — RXMED WILLOW AMBULATORY MEDICATION CHARGE: Performed by: INTERNAL MEDICINE

## 2022-05-02 ENCOUNTER — TELEPHONE (OUTPATIENT)
Dept: PHYSICAL THERAPY | Facility: MEDICAL CENTER | Age: 47
End: 2022-05-02
Payer: COMMERCIAL

## 2022-05-02 NOTE — OP THERAPY DISCHARGE SUMMARY
Outpatient Physical Therapy  DISCHARGE SUMMARY NOTE      [facility] Outpatient Physical Therapy  [address]  [place] [address]  Phone:  [phone]  Fax:  [phone]    Date of Visit: 05/02/2022    Patient: Gurmeet Mercer  YOB: 1975  MRN: [id number]     Referring Provider: No referring provider defined for this encounter.   Referring Diagnosis No admission diagnoses are documented for this encounter.         Functional Assessment Used        Your patient is being discharged from Physical Therapy with the following comments:   · Goals partially met  · Pt wishing to DC    Comments:  Pt 15 min to appt today-read schedule wrong.   He reports he is unsure if he even requires further skilled physical therapy at this time. Has noticed flare up recently but attributes this to ongoing back problems. Discussion of DC today with pt having option to return to PT at a later date if needed. Pt wishing to continue with DC and emphasis on independent hep. Will DC today.     No charge visit today.     Overall, pt seen for 6 visits with some improvements in back pain, improving core strength and t/s mobility. He has partially met his goals but is continuing to have intermittent back pain. He is requesting to continue with his independent hep; abmits decreased compliance of late. Reports he will follow up with PT at a later date if needed.     Limitations Remaining:  Please see last daily note 4/5/22    Recommendations:  Pt has partially satisfied goals and is ready to DC today with independent hep. All questions answered and verbally reviewed independent hep with pt. Expressed to pt that he can return to skilled physical therapy if condition should change or worsen in future.      Darin Castillo, PT    Date: 5/2/2022

## 2022-05-06 ENCOUNTER — PHARMACY VISIT (OUTPATIENT)
Dept: PHARMACY | Facility: MEDICAL CENTER | Age: 47
End: 2022-05-06
Payer: COMMERCIAL

## 2022-06-21 PROCEDURE — RXMED WILLOW AMBULATORY MEDICATION CHARGE: Performed by: INTERNAL MEDICINE

## 2022-07-01 ENCOUNTER — PHARMACY VISIT (OUTPATIENT)
Dept: PHARMACY | Facility: MEDICAL CENTER | Age: 47
End: 2022-07-01
Payer: COMMERCIAL

## 2022-08-10 DIAGNOSIS — J30.89 ENVIRONMENTAL AND SEASONAL ALLERGIES: ICD-10-CM

## 2022-08-10 PROCEDURE — RXMED WILLOW AMBULATORY MEDICATION CHARGE: Performed by: INTERNAL MEDICINE

## 2022-08-10 RX ORDER — TRIAMCINOLONE ACETONIDE 55 UG/1
2 SPRAY, METERED NASAL DAILY
Qty: 10.8 ML | Refills: 3 | Status: SHIPPED | OUTPATIENT
Start: 2022-08-10

## 2022-08-10 NOTE — TELEPHONE ENCOUNTER
Received request via: Patient    Was the patient seen in the last year in this department? NO, LOV: 05/21/2022    Does the patient have an active prescription (recently filled or refills available) for medication(s) requested? No

## 2022-08-18 ENCOUNTER — PHARMACY VISIT (OUTPATIENT)
Dept: PHARMACY | Facility: MEDICAL CENTER | Age: 47
End: 2022-08-18
Payer: COMMERCIAL

## 2022-09-26 PROCEDURE — RXMED WILLOW AMBULATORY MEDICATION CHARGE: Performed by: INTERNAL MEDICINE

## 2022-09-28 ENCOUNTER — PHARMACY VISIT (OUTPATIENT)
Dept: PHARMACY | Facility: MEDICAL CENTER | Age: 47
End: 2022-09-28
Payer: COMMERCIAL

## 2022-11-18 ENCOUNTER — PHARMACY VISIT (OUTPATIENT)
Dept: PHARMACY | Facility: MEDICAL CENTER | Age: 47
End: 2022-11-18
Payer: COMMERCIAL

## 2022-11-18 PROCEDURE — RXMED WILLOW AMBULATORY MEDICATION CHARGE: Performed by: INTERNAL MEDICINE

## 2022-11-18 RX ORDER — INFLUENZA A VIRUS A/BRISBANE/02/2018 IVR-190 (H1N1) ANTIGEN (FORMALDEHYDE INACTIVATED), INFLUENZA A VIRUS A/KANSAS/14/2017 X-327 (H3N2) ANTIGEN (FORMALDEHYDE INACTIVATED), INFLUENZA B VIRUS B/PHUKET/3073/2013 ANTIGEN (FORMALDEHYDE INACTIVATED), AND INFLUENZA B VIRUS B/MARYLAND/15/2016 BX-69A ANTIGEN (FORMALDEHYDE INACTIVATED) 15; 15; 15; 15 UG/.5ML; UG/.5ML; UG/.5ML; UG/.5ML
INJECTION, SUSPENSION INTRAMUSCULAR
Qty: 0.5 ML | Refills: 0 | Status: SHIPPED | OUTPATIENT
Start: 2022-11-18 | End: 2023-01-23

## 2023-01-23 ENCOUNTER — OFFICE VISIT (OUTPATIENT)
Dept: MEDICAL GROUP | Facility: MEDICAL CENTER | Age: 48
End: 2023-01-23
Payer: COMMERCIAL

## 2023-01-23 VITALS
WEIGHT: 223 LBS | TEMPERATURE: 97.6 F | DIASTOLIC BLOOD PRESSURE: 78 MMHG | OXYGEN SATURATION: 96 % | HEIGHT: 73 IN | HEART RATE: 73 BPM | SYSTOLIC BLOOD PRESSURE: 126 MMHG | BODY MASS INDEX: 29.55 KG/M2

## 2023-01-23 DIAGNOSIS — E78.5 DYSLIPIDEMIA: Chronic | ICD-10-CM

## 2023-01-23 DIAGNOSIS — M54.50 LOW BACK PAIN, UNSPECIFIED BACK PAIN LATERALITY, UNSPECIFIED CHRONICITY, UNSPECIFIED WHETHER SCIATICA PRESENT: ICD-10-CM

## 2023-01-23 DIAGNOSIS — Z00.00 PREVENTATIVE HEALTH CARE: ICD-10-CM

## 2023-01-23 DIAGNOSIS — H43.399 VITREOUS FLOATERS, UNSPECIFIED LATERALITY: ICD-10-CM

## 2023-01-23 PROCEDURE — 99214 OFFICE O/P EST MOD 30 MIN: CPT | Performed by: INTERNAL MEDICINE

## 2023-01-23 ASSESSMENT — FIBROSIS 4 INDEX: FIB4 SCORE: 0.79

## 2023-01-23 NOTE — PROGRESS NOTES
Subjective     Gurmeet Mercer is a 47 y.o. 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 Has had low back pain before and saw PT in 2022 but this recent pain was different and more intense at that time, this episode of back pain was not as severe. Also purchased a home TENS unit, and since his back began bothering him, started to use the TENS unit more regularly.  Pain has since improved, and he has gone back to work already.  Patient does require FMLA from January 4 through January 7 for the 3 days that he missed work.  No other trauma, no falls to that area recently.  Continues to have visual disturbances, sometimes with visual floaters flashing lights, occasional decrease in acuity in certain parts of his visual field.  He has seen optometry in the past and we did place referral to neuro-ophthalmology in the past but  has retired.  He has also seen optometry in the past and been diagnosed with refractive amblyopia, has previously seen neurology for headaches, visual changes of unclear etiology.  He needs to use reading glasses at all times now which is different from previous.             Current Outpatient Medications   Medication Sig Dispense Refill    COVID-19mRNA Bival Vacc Pfizer (PFIZER COVID-19 BIVALENT) 30 MCG/0.3ML Suspension injection Inject  into the shoulder, thigh, or buttocks. 0.3 mL 0    influenza vaccine quad (FLUZONE  QUADRIVALENT) 0.5 ML Suspension Prefilled Syringe injection Inject  into the shoulder, thigh, or buttocks. 0.5 mL 0    triamcinolone (NASACORT ALLERGY 24HR) 55 MCG/ACT nasal inhaler Administer 2 Sprays into affected nostril(S) every day. 10.8 mL 3    Nirmatrelvir & Ritonavir 20 x 150 MG & 10 x 100MG Tablet Therapy Pack Take 300 mg nirmatrelvir (two 150 mg tablets) with 100 mg ritonavir (one 100 mg tablet) by mouth, with all three tablets taken together twice daily for 5 days (do not take with sildenafil) 30 Each 0    ipratropium (ATROVENT) 0.03 % Solution Administer 2 Sprays into affected nostril(S) every 12 hours. 30 mL 3    sildenafil (REVATIO) 20 MG tablet Take between 1 to 5 tabs one hour prior to activity, max 5 per day 30 Tablet 5    meclizine (ANTIVERT) 25 MG Tab Take 1 Tablet by mouth 3 times a day as needed for Vertigo. 30 Tablet 0    acetaminophen (TYLENOL) 500 MG Tab Take 500-1,000 mg by mouth every 6 hours as needed.      Ibuprofen 200 MG Cap Take  by mouth.       No current facility-administered medications for this visit.       Allergic rhinitis  8/1/17 start atrovent nasal, has tried astelin and nasonex previously   4/5/22 tried astelin, will try atrovent and allegra  Astelin causes funny taste, zyrtec causes mood changes     Dyslipidemia  9/11 chol 201,trig 179,hdl 37,ldl 128 start fish oil 2 bid  8/12 chol 183,trig 165,hdl 36,ldl 114  9/14/13 chol 208,trig 308,hdl 37,ldl 109  9/4/14 chol 210,trig 329,hdl 37,ldl 107  6/14/16 chol 220,trig 315,hdl 40,ldl 117  9/19/17 chol 210,trig 240,hdl 42,ldl 120  9/18/18 chol 204,trig 369,hdl 40,ldl 90  8/12/19 chol 215,trig 167,hdl 42,ldl 140  4/7/22 chol 232,trig 152,hdl 45,ldl 157    gerd  11/8/21 takes pepcid as needed  3/14/22 gerd with certain foods and at night, will try pepcid     history headache  8/1/17 tension headache, occasional blurry vision left eye, referral back to ophthalmology, trial of nortriptyline 10 mg question tension headache, atypical  migraine  12/4/17 tried pamelor no benefit, trial of acupuncture, ophthalmology evaluation pending   3/15/18 dr.hale ophthalmology note, scintillating scotomata, likely complicated migraine recommend different of lactic agent, referral to neurology , trial of xiidra drops for dry eyes, severe amblyopia  11/30/18  neurology note headache imaging negative, no evidence of autoimmune disease although markers will be checked, associated vision changes, will schedule EEG  4/29/19 EEG video negative  5/7/19 neurology note daily headache, no criteria for migraine, trial of anaprox 550 mg as needed up to 3 times daily, visual changes no clear etiology, very infrequent, could be migrainous epiphenomena, do not believe medication is worthwhile, consider ambulatory EEG in the future  9/27/21 left-sided facial tingling diagnosed with trigeminal neuralgia    History vision changes  4/29/19 EEG video negative  5/7/19 neurology note daily headache, no criteria for migraine, trial of anaprox 550 mg as needed up to 3 times daily, visual changes no clear etiology, very infrequent, could be migrainous epiphenomena, do not believe medication is worthwhile, consider ambulatory EEG in the future     History wrist pain  8/25/20 occupational medicine note right wrist burning sensation, x-ray ordered  8/25/20 x-ray right wrist negative  11/4/20 occupational medicine note MRI ordered, consider physiatry evaluation  11/19/20 physical therapy note  11/23/20 MRI right wrist without, dorsal radial ulnar strut and proximal triangular fibrocartilage tears with associated ganglion cyst 12 x 8 x 3 mm under the extensor carpi ulnaris, partial scapholunate ligament tear volar surface with 11 mm volar radial ganglion cyst  12/2/20 occupational medicine note referral to hand surgery  1/18/21  orthopedic note injection [facility] tendon sheath performed, continue work without limitations, follow-up 4 weeks  2/22/21   "orthopedic note follow-up [facility] tendon sheath injection 1 month ago, wrist is doing well, close workmen's compensation case, could return to full duty    Low back pain  12/15/21 most recent flareup started by bending over and putting on his shoe  1/22/22 referral physical therapy, consider imaging, continue over-the-counter ibuprofen, trial of zanaflex, no need for imaging now, will complete FMLA  3/14/22 going to PT, on motrin 2 tabs prn, has not tried zanaflex yet, will try to compare the benefit with motrin  Tried skelaxin and flexeril previously     Preventative health  9/21/12 tdap  7/5/16 apnea link negative  6/7/19 colonoscopy per [facility] normal mucosa 2 mm cecum polyp pathology normal mucosa  4/7/22 testosterone 219  4/7/22 A1c 5.6%  4/7/22 psa 0.86  4/7/22 vit d 25  4/19/22 hep b third  11/18/22 flu  11/18/22 covid bivalent                Patient Active Problem List   Diagnosis    Family history of brain aneurysm    Preventative health care    Dyslipidemia    Erectile dysfunction    Refractive amblyopia    Allergic rhinitis    History of headache    History of visual changes    GERD (gastroesophageal reflux disease)    History of wrist pain    Low back pain                  Patient Care Team:  Dheeraj Burr M.D. as PCP - General (Internal Medicine)    ROS           Objective     /78 (BP Location: Left arm, Patient Position: Sitting, BP Cuff Size: Adult)   Pulse 73   Temp 36.4 °C (97.6 °F)   Ht 1.854 m (6' 1\")   Wt 101 kg (223 lb)   SpO2 96%   BMI 29.42 kg/m²      Physical Exam  Vitals and nursing note reviewed.   Constitutional:       Appearance: Normal appearance.   HENT:      Head: Normocephalic and atraumatic.      Right Ear: External ear normal.      Left Ear: External ear normal.   Eyes:      Conjunctiva/sclera: Conjunctivae normal.   Cardiovascular:      Rate and Rhythm: Normal rate and regular rhythm.      Heart sounds: Normal heart sounds.   Pulmonary:      Effort: Pulmonary effort is " normal.      Breath sounds: Normal breath sounds.   Abdominal:      General: There is no distension.   Musculoskeletal:         General: No swelling.   Skin:     Findings: No bruising.   Neurological:      General: No focal deficit present.      Mental Status: He is alert.   Psychiatric:         Mood and Affect: Mood normal.         Behavior: Behavior normal.     No spinal tenderness, left lower paraspinal area no ecchymosis, no bruising, no rash to suggest shingles, normal left dorsiflexion, plantarflexion, knee flexion extension, hip flexion extension bilateral                      Assessment & Plan      Assessment  #1 mechanical low back pain, improved after flareup recently, last occurrence prior to this was a year ago, he did see physical therapy at that time.  Over-the-counter ibuprofen does provide benefit without any significant GI upset, muscle relaxants in the past Skelaxin and Flexeril have not been beneficial.  No current sciatica symptoms.  He has tried a TENS unit, back stretches, inversion table, and using a zero gravity bed for his back.    #2 visual changes, has had floaters, decreased visual acuity, has seen optometry, ophthalmology, and neurology, previously placed referral to neuro-ophthalmology    #3 dyslipidemia diet controlled 4/7/22 chol 232,trig 152,hdl 45,ldl 157        Plan  #!  Patient continues to improve, no imaging currently necessary for low back pain mechanical in nature, can continue ibuprofen as needed, TENS unit, back stretching exercises, inversion table, has seen physical therapy previously, he declines physical therapy follow-up for now    #2 given the intermittent nature of his back pain and episodic flareups, do recommend FMLA, he will obtain FMLA paperwork for intermittent leave once per month 1-3 days off at a time per month and needs continuous FMLA for the time frame 1/4-1/7/23 during his most recent flareup for which he missed work    #3 continue TENS, stretching  exercises, continue use of inversion table, heat as needed    #4 labs     #5  Overdue for Tdap vaccine, states he has had this through occupational health, he will check on that     #6 referral back to neuro-ophthalmology, since  has retired, place referral to

## 2023-03-12 ENCOUNTER — TELEPHONE (OUTPATIENT)
Dept: MEDICAL GROUP | Facility: MEDICAL CENTER | Age: 48
End: 2023-03-12
Payer: COMMERCIAL

## 2023-03-12 DIAGNOSIS — H53.9 VISUAL DISTURBANCE: ICD-10-CM

## 2023-03-22 ENCOUNTER — TELEPHONE (OUTPATIENT)
Dept: MEDICAL GROUP | Facility: MEDICAL CENTER | Age: 48
End: 2023-03-22
Payer: COMMERCIAL

## 2023-03-22 DIAGNOSIS — L98.9 SKIN LESION: ICD-10-CM

## 2023-03-24 ENCOUNTER — TELEPHONE (OUTPATIENT)
Dept: MEDICAL GROUP | Facility: MEDICAL CENTER | Age: 48
End: 2023-03-24
Payer: COMMERCIAL

## 2023-03-24 NOTE — TELEPHONE ENCOUNTER
Gurmeet Mercer's wife     Wife requested that you submit a REF to [facility] for [place] to get a skin check.    Please forward to , ATTN: Juan Fax [phone]

## 2023-08-14 ENCOUNTER — APPOINTMENT (RX ONLY)
Dept: URBAN - METROPOLITAN AREA CLINIC 20 | Facility: CLINIC | Age: 48
Setting detail: DERMATOLOGY
End: 2023-08-14

## 2023-08-14 DIAGNOSIS — D18.0 HEMANGIOMA: ICD-10-CM

## 2023-08-14 DIAGNOSIS — Z71.89 OTHER SPECIFIED COUNSELING: ICD-10-CM

## 2023-08-14 DIAGNOSIS — L91.8 OTHER HYPERTROPHIC DISORDERS OF THE SKIN: ICD-10-CM | Status: INADEQUATELY CONTROLLED

## 2023-08-14 DIAGNOSIS — L71.8 OTHER ROSACEA: ICD-10-CM | Status: INADEQUATELY CONTROLLED

## 2023-08-14 DIAGNOSIS — D22 MELANOCYTIC NEVI: ICD-10-CM

## 2023-08-14 DIAGNOSIS — L81.4 OTHER MELANIN HYPERPIGMENTATION: ICD-10-CM

## 2023-08-14 PROBLEM — D22.39 MELANOCYTIC NEVI OF OTHER PARTS OF FACE: Status: ACTIVE | Noted: 2023-08-14

## 2023-08-14 PROBLEM — D22.5 MELANOCYTIC NEVI OF TRUNK: Status: ACTIVE | Noted: 2023-08-14

## 2023-08-14 PROBLEM — D22.71 MELANOCYTIC NEVI OF RIGHT LOWER LIMB, INCLUDING HIP: Status: ACTIVE | Noted: 2023-08-14

## 2023-08-14 PROBLEM — D22.62 MELANOCYTIC NEVI OF LEFT UPPER LIMB, INCLUDING SHOULDER: Status: ACTIVE | Noted: 2023-08-14

## 2023-08-14 PROBLEM — D18.01 HEMANGIOMA OF SKIN AND SUBCUTANEOUS TISSUE: Status: ACTIVE | Noted: 2023-08-14

## 2023-08-14 PROBLEM — D22.61 MELANOCYTIC NEVI OF RIGHT UPPER LIMB, INCLUDING SHOULDER: Status: ACTIVE | Noted: 2023-08-14

## 2023-08-14 PROBLEM — D22.72 MELANOCYTIC NEVI OF LEFT LOWER LIMB, INCLUDING HIP: Status: ACTIVE | Noted: 2023-08-14

## 2023-08-14 PROCEDURE — 99204 OFFICE O/P NEW MOD 45 MIN: CPT | Mod: 25

## 2023-08-14 PROCEDURE — 17110 DESTRUCTION B9 LES UP TO 14: CPT

## 2023-08-14 PROCEDURE — ? LIQUID NITROGEN

## 2023-08-14 PROCEDURE — ? BENIGN DESTRUCTION

## 2023-08-14 PROCEDURE — ? MEDICATION COUNSELING

## 2023-08-14 PROCEDURE — ? COUNSELING

## 2023-08-14 PROCEDURE — ? PRESCRIPTION

## 2023-08-14 PROCEDURE — ? ADDITIONAL NOTES

## 2023-08-14 PROCEDURE — ? SUNSCREEN RECOMMENDATIONS

## 2023-08-14 RX ORDER — AZELAIC ACID 0.15 G/G
GEL TOPICAL DAILY
Qty: 50 | Refills: 3 | Status: ERX | COMMUNITY
Start: 2023-08-14

## 2023-08-14 RX ADMIN — AZELAIC ACID: 0.15 GEL TOPICAL at 00:00

## 2023-08-14 ASSESSMENT — LOCATION SIMPLE DESCRIPTION DERM
LOCATION SIMPLE: RIGHT FOREHEAD
LOCATION SIMPLE: ABDOMEN
LOCATION SIMPLE: CHEST
LOCATION SIMPLE: RIGHT CHEEK
LOCATION SIMPLE: LEFT PRETIBIAL REGION
LOCATION SIMPLE: RIGHT PRETIBIAL REGION
LOCATION SIMPLE: ANTERIOR SCALP
LOCATION SIMPLE: RIGHT POSTERIOR UPPER ARM
LOCATION SIMPLE: RIGHT LOWER BACK
LOCATION SIMPLE: RIGHT FOREARM
LOCATION SIMPLE: LEFT POSTERIOR UPPER ARM
LOCATION SIMPLE: RIGHT AXILLARY VAULT
LOCATION SIMPLE: RIGHT POSTERIOR THIGH
LOCATION SIMPLE: RIGHT UPPER BACK
LOCATION SIMPLE: LEFT FOREARM
LOCATION SIMPLE: LEFT CHEEK
LOCATION SIMPLE: LEFT POSTERIOR THIGH
LOCATION SIMPLE: RIGHT CHEEK

## 2023-08-14 ASSESSMENT — LOCATION DETAILED DESCRIPTION DERM
LOCATION DETAILED: LEFT LATERAL PROXIMAL PRETIBIAL REGION
LOCATION DETAILED: RIGHT SUPERIOR MEDIAL UPPER BACK
LOCATION DETAILED: RIGHT PROXIMAL PRETIBIAL REGION
LOCATION DETAILED: LEFT CENTRAL MALAR CHEEK
LOCATION DETAILED: RIGHT INFERIOR CENTRAL MALAR CHEEK
LOCATION DETAILED: RIGHT DISTAL POSTERIOR UPPER ARM
LOCATION DETAILED: RIGHT VENTRAL PROXIMAL FOREARM
LOCATION DETAILED: RIGHT INFERIOR MEDIAL UPPER BACK
LOCATION DETAILED: LEFT PROXIMAL POSTERIOR UPPER ARM
LOCATION DETAILED: LEFT RIB CAGE
LOCATION DETAILED: RIGHT INFERIOR FOREHEAD
LOCATION DETAILED: RIGHT DISTAL POSTERIOR THIGH
LOCATION DETAILED: LEFT DISTAL POSTERIOR THIGH
LOCATION DETAILED: MID-FRONTAL SCALP
LOCATION DETAILED: RIGHT INFERIOR MEDIAL MIDBACK
LOCATION DETAILED: LEFT LATERAL INFERIOR CHEST
LOCATION DETAILED: RIGHT CENTRAL MALAR CHEEK
LOCATION DETAILED: RIGHT SUPERIOR UPPER BACK
LOCATION DETAILED: LEFT VENTRAL PROXIMAL FOREARM
LOCATION DETAILED: RIGHT AXILLARY VAULT

## 2023-08-14 ASSESSMENT — LOCATION ZONE DERM
LOCATION ZONE: ARM
LOCATION ZONE: AXILLAE
LOCATION ZONE: TRUNK
LOCATION ZONE: FACE
LOCATION ZONE: SCALP
LOCATION ZONE: LEG
LOCATION ZONE: FACE

## 2023-08-14 NOTE — PROCEDURE: BENIGN DESTRUCTION
Post-Care Instructions: I reviewed with the patient in detail post-care instructions. Patient is to wear sunprotection, and avoid picking at any of the treated lesions. Pt may apply Vaseline to crusted or scabbing areas.
Consent: The patient's consent was obtained including but not limited to risks of crusting, scabbing, blistering, scarring, darker or lighter pigmentary change, recurrence, incomplete removal and infection.
Medical Necessity Information: It is in your best interest to select a reason for this procedure from the list below. All of these items fulfill various CMS LCD requirements except the new and changing color options.
Include Z78.9 (Other Specified Conditions Influencing Health Status) As An Associated Diagnosis?: No
Anesthesia Type: 1% lidocaine without epinephrine and a 1:3 solution of 8.4% sodium bicarbonate
Anesthesia Volume In Cc: 0.3
Treatment Number (Will Not Render If 0): 1
Detail Level: Detailed

## 2023-08-14 NOTE — PROCEDURE: MEDICATION COUNSELING
Imiquimod Counseling:  I discussed with the patient the risks of imiquimod including but not limited to erythema, scaling, itching, weeping, crusting, and pain.  Patient understands that the inflammatory response to imiquimod is variable from person to person and was educated regarded proper titration schedule.  If flu-like symptoms develop, patient knows to discontinue the medication and contact us.
Albendazole Counseling:  I discussed with the patient the risks of albendazole including but not limited to cytopenia, kidney damage, nausea/vomiting and severe allergy.  The patient understands that this medication is being used in an off-label manner.
Fluconazole Counseling:  Patient counseled regarding adverse effects of fluconazole including but not limited to headache, diarrhea, nausea, upset stomach, liver function test abnormalities, taste disturbance, and stomach pain.  There is a rare possibility of liver failure that can occur when taking fluconazole.  The patient understands that monitoring of LFTs and kidney function test may be required, especially at baseline. The patient verbalized understanding of the proper use and possible adverse effects of fluconazole.  All of the patient's questions and concerns were addressed.
Clindamycin Counseling: I counseled the patient regarding use of clindamycin as an antibiotic for prophylactic and/or therapeutic purposes. Clindamycin is active against numerous classes of bacteria, including skin bacteria. Side effects may include nausea, diarrhea, gastrointestinal upset, rash, hives, yeast infections, and in rare cases, colitis.
Opzelura Pregnancy And Lactation Text: There is insufficient data to evaluate drug-associated risk for major birth defects, miscarriage, or other adverse maternal or fetal outcomes.  There is a pregnancy registry that monitors pregnancy outcomes in pregnant persons exposed to the medication during pregnancy.  It is unknown if this medication is excreted in breast milk.  Do not breastfeed during treatment and for about 4 weeks after the last dose.
Azelaic Acid Pregnancy And Lactation Text: This medication is considered safe during pregnancy and breast feeding.
Topical Steroids Applications Pregnancy And Lactation Text: Most topical steroids are considered safe to use during pregnancy and lactation.  Any topical steroid applied to the breast or nipple should be washed off before breastfeeding.
Humira Counseling:  I discussed with the patient the risks of adalimumab including but not limited to myelosuppression, immunosuppression, autoimmune hepatitis, demyelinating diseases, lymphoma, and serious infections.  The patient understands that monitoring is required including a PPD at baseline and must alert us or the primary physician if symptoms of infection or other concerning signs are noted.
Oxybutynin Counseling:  I discussed with the patient the risks of oxybutynin including but not limited to skin rash, drowsiness, dry mouth, difficulty urinating, and blurred vision.
Arava Pregnancy And Lactation Text: This medication is Pregnancy Category X and is absolutely contraindicated during pregnancy. It is unknown if it is excreted in breast milk.
Solaraze Counseling:  I discussed with the patient the risks of Solaraze including but not limited to erythema, scaling, itching, weeping, crusting, and pain.
Terbinafine Pregnancy And Lactation Text: This medication is Pregnancy Category B and is considered safe during pregnancy. It is also excreted in breast milk and breast feeding isn't recommended.
Cimzia Counseling:  I discussed with the patient the risks of Cimzia including but not limited to immunosuppression, allergic reactions and infections.  The patient understands that monitoring is required including a PPD at baseline and must alert us or the primary physician if symptoms of infection or other concerning signs are noted.
Birth Control Pills Pregnancy And Lactation Text: This medication should be avoided if pregnant and for the first 30 days post-partum.
Glycopyrrolate Counseling:  I discussed with the patient the risks of glycopyrrolate including but not limited to skin rash, drowsiness, dry mouth, difficulty urinating, and blurred vision.
Tranexamic Acid Pregnancy And Lactation Text: It is unknown if this medication is safe during pregnancy or breast feeding.
Libtayo Pregnancy And Lactation Text: This medication is contraindicated in pregnancy and when breast feeding.
Taltz Pregnancy And Lactation Text: The risk during pregnancy and breastfeeding is uncertain with this medication.
Zoryve Counseling:  I discussed with the patient that Zoryve is not for use in the eyes, mouth or vagina. The most commonly reported side effects include diarrhea, headache, insomnia, application site pain, upper respiratory tract infections, and urinary tract infections.  All of the patient's questions and concerns were addressed.
Cibinqo Pregnancy And Lactation Text: It is unknown if this medication will adversely affect pregnancy or breast feeding.  You should not take this medication if you are currently pregnant or planning a pregnancy or while breastfeeding.
Quinolones Pregnancy And Lactation Text: This medication is Pregnancy Category C and it isn't know if it is safe during pregnancy. It is also excreted in breast milk.
Imiquimod Pregnancy And Lactation Text: This medication is Pregnancy Category C. It is unknown if this medication is excreted in breast milk.
Cyclophosphamide Pregnancy And Lactation Text: This medication is Pregnancy Category D and it isn't considered safe during pregnancy. This medication is excreted in breast milk.
Drysol Counseling:  I discussed with the patient the risks of drysol/aluminum chloride including but not limited to skin rash, itching, irritation, burning.
Minocycline Pregnancy And Lactation Text: This medication is Pregnancy Category D and not consider safe during pregnancy. It is also excreted in breast milk.
Nsaids Pregnancy And Lactation Text: These medications are considered safe up to 30 weeks gestation. It is excreted in breast milk.
Oxybutynin Pregnancy And Lactation Text: This medication is Pregnancy Category B and is considered safe during pregnancy. It is unknown if it is excreted in breast milk.
Clofazimine Counseling:  I discussed with the patient the risks of clofazimine including but not limited to skin and eye pigmentation, liver damage, nausea/vomiting, gastrointestinal bleeding and allergy.
Picato Counseling:  I discussed with the patient the risks of Picato including but not limited to erythema, scaling, itching, weeping, crusting, and pain.
Acitretin Pregnancy And Lactation Text: This medication is Pregnancy Category X and should not be given to women who are pregnant or may become pregnant in the future. This medication is excreted in breast milk.
Clindamycin Pregnancy And Lactation Text: This medication can be used in pregnancy if certain situations. Clindamycin is also present in breast milk.
Benzoyl Peroxide Counseling: Patient counseled that medicine may cause skin irritation and bleach clothing.  In the event of skin irritation, the patient was advised to reduce the amount of the drug applied or use it less frequently.   The patient verbalized understanding of the proper use and possible adverse effects of benzoyl peroxide.  All of the patient's questions and concerns were addressed.
Humira Pregnancy And Lactation Text: This medication is Pregnancy Category B and is considered safe during pregnancy. It is unknown if this medication is excreted in breast milk.
Albendazole Pregnancy And Lactation Text: This medication is Pregnancy Category C and it isn't known if it is safe during pregnancy. It is also excreted in breast milk.
Solaraze Pregnancy And Lactation Text: This medication is Pregnancy Category B and is considered safe. There is some data to suggest avoiding during the third trimester. It is unknown if this medication is excreted in breast milk.
Simponi Counseling:  I discussed with the patient the risks of golimumab including but not limited to myelosuppression, immunosuppression, autoimmune hepatitis, demyelinating diseases, lymphoma, and serious infections.  The patient understands that monitoring is required including a PPD at baseline and must alert us or the primary physician if symptoms of infection or other concerning signs are noted.
Zoryve Pregnancy And Lactation Text: It is unknown if this medication can cause problems during pregnancy and breastfeeding.
Topical Sulfur Applications Counseling: Topical Sulfur Counseling: Patient counseled that this medication may cause skin irritation or allergic reactions.  In the event of skin irritation, the patient was advised to reduce the amount of the drug applied or use it less frequently.   The patient verbalized understanding of the proper use and possible adverse effects of topical sulfur application.  All of the patient's questions and concerns were addressed.
Cimzia Pregnancy And Lactation Text: This medication crosses the placenta but can be considered safe in certain situations. Cimzia may be excreted in breast milk.
Odomzo Counseling- I discussed with the patient the risks of Odomzo including but not limited to nausea, vomiting, diarrhea, constipation, weight loss, changes in the sense of taste, decreased appetite, muscle spasms, and hair loss.  The patient verbalized understanding of the proper use and possible adverse effects of Odomzo.  All of the patient's questions and concerns were addressed.
Valtrex Counseling: I discussed with the patient the risks of valacyclovir including but not limited to kidney damage, nausea, vomiting and severe allergy.  The patient understands that if the infection seems to be worsening or is not improving, they are to call.
Tremfya Counseling: I discussed with the patient the risks of guselkumab including but not limited to immunosuppression, serious infections, worsening of inflammatory bowel disease and drug reactions.  The patient understands that monitoring is required including a PPD at baseline and must alert us or the primary physician if symptoms of infection or other concerning signs are noted.
Glycopyrrolate Pregnancy And Lactation Text: This medication is Pregnancy Category B and is considered safe during pregnancy. It is unknown if it is excreted breast milk.
Rifampin Counseling: I discussed with the patient the risks of rifampin including but not limited to liver damage, kidney damage, red-orange body fluids, nausea/vomiting and severe allergy.
Olumiant Counseling: I discussed with the patient the risks of Olumiant therapy including but not limited to upper respiratory tract infections, shingles, cold sores, and nausea. Live vaccines should be avoided.  This medication has been linked to serious infections; higher rate of mortality; malignancy and lymphoproliferative disorders; major adverse cardiovascular events; thrombosis; gastrointestinal perforations; neutropenia; lymphopenia; anemia; liver enzyme elevations; and lipid elevations.
Rinvoq Counseling: I discussed with the patient the risks of Rinvoq therapy including but not limited to upper respiratory tract infections, shingles, cold sores, bronchitis, nausea, cough, fever, acne, and headache. Live vaccines should be avoided.  This medication has been linked to serious infections; higher rate of mortality; malignancy and lymphoproliferative disorders; major adverse cardiovascular events; thrombosis; thrombocytopenia, anemia, and neutropenia; lipid elevations; liver enzyme elevations; and gastrointestinal perforations.
Quinolones Counseling:  I discussed with the patient the risks of fluoroquinolones including but not limited to GI upset, allergic reaction, drug rash, diarrhea, dizziness, photosensitivity, yeast infections, liver function test abnormalities, tendonitis/tendon rupture.
Cyclosporine Counseling:  I discussed with the patient the risks of cyclosporine including but not limited to hypertension, gingival hyperplasia,myelosuppression, immunosuppression, liver damage, kidney damage, neurotoxicity, lymphoma, and serious infections. The patient understands that monitoring is required including baseline blood pressure, CBC, CMP, lipid panel and uric acid, and then 1-2 times monthly CMP and blood pressure.
Spironolactone Counseling: Patient advised regarding risks of diarrhea, abdominal pain, hyperkalemia, birth defects (for female patients), liver toxicity and renal toxicity. The patient may need blood work to monitor liver and kidney function and potassium levels while on therapy. The patient verbalized understanding of the proper use and possible adverse effects of spironolactone.  All of the patient's questions and concerns were addressed.
Griseofulvin Counseling:  I discussed with the patient the risks of griseofulvin including but not limited to photosensitivity, cytopenia, liver damage, nausea/vomiting and severe allergy.  The patient understands that this medication is best absorbed when taken with a fatty meal (e.g., ice cream or french fries).
Olanzapine Counseling- I discussed with the patient the common side effects of olanzapine including but are not limited to: lack of energy, dry mouth, increased appetite, sleepiness, tremor, constipation, dizziness, changes in behavior, or restlessness.  Explained that teenagers are more likely to experience headaches, abdominal pain, pain in the arms or legs, tiredness, and sleepiness.  Serious side effects include but are not limited: increased risk of death in elderly patients who are confused, have memory loss, or dementia-related psychosis; hyperglycemia; increased cholesterol and triglycerides; and weight gain.
Ilumya Counseling: I discussed with the patient the risks of tildrakizumab including but not limited to immunosuppression, malignancy, posterior leukoencephalopathy syndrome, and serious infections.  The patient understands that monitoring is required including a PPD at baseline and must alert us or the primary physician if symptoms of infection or other concerning signs are noted.
Clofazimine Pregnancy And Lactation Text: This medication is Pregnancy Category C and isn't considered safe during pregnancy. It is excreted in breast milk.
Bexarotene Counseling:  I discussed with the patient the risks of bexarotene including but not limited to hair loss, dry lips/skin/eyes, liver abnormalities, hyperlipidemia, pancreatitis, depression/suicidal ideation, photosensitivity, drug rash/allergic reactions, hypothyroidism, anemia, leukopenia, infection, cataracts, and teratogenicity.  Patient understands that they will need regular blood tests to check lipid profile, liver function tests, white blood cell count, thyroid function tests and pregnancy test if applicable.
Propranolol Counseling:  I discussed with the patient the risks of propranolol including but not limited to low heart rate, low blood pressure, low blood sugar, restlessness and increased cold sensitivity. They should call the office if they experience any of these side effects.
Klisyri Counseling:  I discussed with the patient the risks of Klisyri including but not limited to erythema, scaling, itching, weeping, crusting, and pain.
Doxycycline Counseling:  Patient counseled regarding possible photosensitivity and increased risk for sunburn.  Patient instructed to avoid sunlight, if possible.  When exposed to sunlight, patients should wear protective clothing, sunglasses, and sunscreen.  The patient was instructed to call the office immediately if the following severe adverse effects occur:  hearing changes, easy bruising/bleeding, severe headache, or vision changes.  The patient verbalized understanding of the proper use and possible adverse effects of doxycycline.  All of the patient's questions and concerns were addressed.
Benzoyl Peroxide Pregnancy And Lactation Text: This medication is Pregnancy Category C. It is unknown if benzoyl peroxide is excreted in breast milk.
Cimetidine Counseling:  I discussed with the patient the risks of Cimetidine including but not limited to gynecomastia, headache, diarrhea, nausea, drowsiness, arrhythmias, pancreatitis, skin rashes, psychosis, bone marrow suppression and kidney toxicity.
Ivermectin Counseling:  Patient instructed to take medication on an empty stomach with a full glass of water.  Patient informed of potential adverse effects including but not limited to nausea, diarrhea, dizziness, itching, and swelling of the extremities or lymph nodes.  The patient verbalized understanding of the proper use and possible adverse effects of ivermectin.  All of the patient's questions and concerns were addressed.
Topical Clindamycin Counseling: Patient counseled that this medication may cause skin irritation or allergic reactions.  In the event of skin irritation, the patient was advised to reduce the amount of the drug applied or use it less frequently.   The patient verbalized understanding of the proper use and possible adverse effects of clindamycin.  All of the patient's questions and concerns were addressed.
Hydroxychloroquine Counseling:  I discussed with the patient that a baseline ophthalmologic exam is needed at the start of therapy and every year thereafter while on therapy. A CBC may also be warranted for monitoring.  The side effects of this medication were discussed with the patient, including but not limited to agranulocytosis, aplastic anemia, seizures, rashes, retinopathy, and liver toxicity. Patient instructed to call the office should any adverse effect occur.  The patient verbalized understanding of the proper use and possible adverse effects of Plaquenil.  All the patient's questions and concerns were addressed.
Soolantra Counseling: I discussed with the patients the risks of topial Soolantra. This is a medicine which decreases the number of mites and inflammation in the skin. You experience burning, stinging, eye irritation or allergic reactions.  Please call our office if you develop any problems from using this medication.
Valtrex Pregnancy And Lactation Text: this medication is Pregnancy Category B and is considered safe during pregnancy. This medication is not directly found in breast milk but it's metabolite acyclovir is present.
Topical Sulfur Applications Pregnancy And Lactation Text: This medication is Pregnancy Category C and has an unknown safety profile during pregnancy. It is unknown if this topical medication is excreted in breast milk.
Azithromycin Counseling:  I discussed with the patient the risks of azithromycin including but not limited to GI upset, allergic reaction, drug rash, diarrhea, and yeast infections.
Elidel Counseling: Patient may experience a mild burning sensation during topical application. Elidel is not approved in children less than 2 years of age. There have been case reports of hematologic and skin malignancies in patients using topical calcineurin inhibitors although causality is questionable.
Olanzapine Pregnancy And Lactation Text: This medication is pregnancy category C.   There are no adequate and well controlled trials with olanzapine in pregnant females.  Olanzapine should be used during pregnancy only if the potential benefit justifies the potential risk to the fetus.   In a study in lactating healthy women, olanzapine was excreted in breast milk.  It is recommended that women taking olanzapine should not breast feed.
Cosentyx Counseling:  I discussed with the patient the risks of Cosentyx including but not limited to worsening of Crohn's disease, immunosuppression, allergic reactions and infections.  The patient understands that monitoring is required including a PPD at baseline and must alert us or the primary physician if symptoms of infection or other concerning signs are noted.
Rifampin Pregnancy And Lactation Text: This medication is Pregnancy Category C and it isn't know if it is safe during pregnancy. It is also excreted in breast milk and should not be used if you are breast feeding.
Spironolactone Pregnancy And Lactation Text: This medication can cause feminization of the male fetus and should be avoided during pregnancy. The active metabolite is also found in breast milk.
Rinvoq Pregnancy And Lactation Text: Based on animal studies, Rinvoq may cause embryo-fetal harm when administered to pregnant women.  The medication should not be used in pregnancy.  Breastfeeding is not recommended during treatment and for 6 days after the last dose.
Griseofulvin Pregnancy And Lactation Text: This medication is Pregnancy Category X and is known to cause serious birth defects. It is unknown if this medication is excreted in breast milk but breast feeding should be avoided.
Bexarotene Pregnancy And Lactation Text: This medication is Pregnancy Category X and should not be given to women who are pregnant or may become pregnant. This medication should not be used if you are breast feeding.
Detail Level: Zone
Zyclara Counseling:  I discussed with the patient the risks of imiquimod including but not limited to erythema, scaling, itching, weeping, crusting, and pain.  Patient understands that the inflammatory response to imiquimod is variable from person to person and was educated regarded proper titration schedule.  If flu-like symptoms develop, patient knows to discontinue the medication and contact us.
Klisyri Pregnancy And Lactation Text: It is unknown if this medication can harm a developing fetus or if it is excreted in breast milk.
Cyclosporine Pregnancy And Lactation Text: This medication is Pregnancy Category C and it isn't know if it is safe during pregnancy. This medication is excreted in breast milk.
Olumiant Pregnancy And Lactation Text: Based on animal studies, Olumiant may cause embryo-fetal harm when administered to pregnant women.  The medication should not be used in pregnancy.  Breastfeeding is not recommended during treatment.
Carac Counseling:  I discussed with the patient the risks of Carac including but not limited to erythema, scaling, itching, weeping, crusting, and pain.
Doxycycline Pregnancy And Lactation Text: This medication is Pregnancy Category D and not consider safe during pregnancy. It is also excreted in breast milk but is considered safe for shorter treatment courses.
Colchicine Counseling:  Patient counseled regarding adverse effects including but not limited to stomach upset (nausea, vomiting, stomach pain, or diarrhea).  Patient instructed to limit alcohol consumption while taking this medication.  Colchicine may reduce blood counts especially with prolonged use.  The patient understands that monitoring of kidney function and blood counts may be required, especially at baseline. The patient verbalized understanding of the proper use and possible adverse effects of colchicine.  All of the patient's questions and concerns were addressed.
Protopic Counseling: Patient may experience a mild burning sensation during topical application. Protopic is not approved in children less than 2 years of age. There have been case reports of hematologic and skin malignancies in patients using topical calcineurin inhibitors although causality is questionable.
Propranolol Pregnancy And Lactation Text: This medication is Pregnancy Category C and it isn't known if it is safe during pregnancy. It is excreted in breast milk.
Hydroxychloroquine Pregnancy And Lactation Text: This medication has been shown to cause fetal harm but it isn't assigned a Pregnancy Risk Category. There are small amounts excreted in breast milk.
Use Enhanced Medication Counseling?: No
Xolair Counseling:  Patient informed of potential adverse effects including but not limited to fever, muscle aches, rash and allergic reactions.  The patient verbalized understanding of the proper use and possible adverse effects of Xolair.  All of the patient's questions and concerns were addressed.
Azithromycin Pregnancy And Lactation Text: This medication is considered safe during pregnancy and is also secreted in breast milk.
Wartpeel Counseling:  I discussed with the patient the risks of Wartpeel including but not limited to erythema, scaling, itching, weeping, crusting, and pain.
Sarecycline Counseling: Patient advised regarding possible photosensitivity and discoloration of the teeth, skin, lips, tongue and gums.  Patient instructed to avoid sunlight, if possible.  When exposed to sunlight, patients should wear protective clothing, sunglasses, and sunscreen.  The patient was instructed to call the office immediately if the following severe adverse effects occur:  hearing changes, easy bruising/bleeding, severe headache, or vision changes.  The patient verbalized understanding of the proper use and possible adverse effects of sarecycline.  All of the patient's questions and concerns were addressed.
Soolantra Pregnancy And Lactation Text: This medication is Pregnancy Category C. This medication is considered safe during breast feeding.
Sotyktu Counseling:  I discussed the most common side effects of Sotyktu including: common cold, sore throat, sinus infections, cold sores, canker sores, folliculitis, and acne.? I also discussed more serious side effects of Sotyktu including but not limited to: serious allergic reactions; increased risk for infections such as TB; cancers such as lymphomas; rhabdomyolysis and elevated CPK; and elevated triglycerides and liver enzymes.?
Itraconazole Counseling:  I discussed with the patient the risks of itraconazole including but not limited to liver damage, nausea/vomiting, neuropathy, and severe allergy.  The patient understands that this medication is best absorbed when taken with acidic beverages such as non-diet cola or ginger ale.  The patient understands that monitoring is required including baseline LFTs and repeat LFTs at intervals.  The patient understands that they are to contact us or the primary physician if concerning signs are noted.
Doxepin Counseling:  Patient advised that the medication is sedating and not to drive a car after taking this medication. Patient informed of potential adverse effects including but not limited to dry mouth, urinary retention, and blurry vision.  The patient verbalized understanding of the proper use and possible adverse effects of doxepin.  All of the patient's questions and concerns were addressed.
Infliximab Counseling:  I discussed with the patient the risks of infliximab including but not limited to myelosuppression, immunosuppression, autoimmune hepatitis, demyelinating diseases, lymphoma, and serious infections.  The patient understands that monitoring is required including a PPD at baseline and must alert us or the primary physician if symptoms of infection or other concerning signs are noted.
Topical Ketoconazole Counseling: Patient counseled that this medication may cause skin irritation or allergic reactions.  In the event of skin irritation, the patient was advised to reduce the amount of the drug applied or use it less frequently.   The patient verbalized understanding of the proper use and possible adverse effects of ketoconazole.  All of the patient's questions and concerns were addressed.
Methotrexate Counseling:  Patient counseled regarding adverse effects of methotrexate including but not limited to nausea, vomiting, abnormalities in liver function tests. Patients may develop mouth sores, rash, diarrhea, and abnormalities in blood counts. The patient understands that monitoring is required including LFT's and blood counts.  There is a rare possibility of scarring of the liver and lung problems that can occur when taking methotrexate. Persistent nausea, loss of appetite, pale stools, dark urine, cough, and shortness of breath should be reported immediately. Patient advised to discontinue methotrexate treatment at least three months before attempting to become pregnant.  I discussed the need for folate supplements while taking methotrexate.  These supplements can decrease side effects during methotrexate treatment. The patient verbalized understanding of the proper use and possible adverse effects of methotrexate.  All of the patient's questions and concerns were addressed.
Skyrizi Counseling: I discussed with the patient the risks of risankizumab-rzaa including but not limited to immunosuppression, and serious infections.  The patient understands that monitoring is required including a PPD at baseline and must alert us or the primary physician if symptoms of infection or other concerning signs are noted.
Minoxidil Counseling: Minoxidil is a topical medication which can increase blood flow where it is applied. It is uncertain how this medication increases hair growth. Side effects are uncommon and include stinging and allergic reactions.
Isotretinoin Counseling: Patient should get monthly blood tests, not donate blood, not drive at night if vision affected, not share medication, and not undergo elective surgery for 6 months after tx completed. Side effects reviewed, pt to contact office should one occur.
Erythromycin Counseling:  I discussed with the patient the risks of erythromycin including but not limited to GI upset, allergic reaction, drug rash, diarrhea, increase in liver enzymes, and yeast infections.
Carac Pregnancy And Lactation Text: This medication is Pregnancy Category X and contraindicated in pregnancy and in women who may become pregnant. It is unknown if this medication is excreted in breast milk.
Azathioprine Counseling:  I discussed with the patient the risks of azathioprine including but not limited to myelosuppression, immunosuppression, hepatotoxicity, lymphoma, and infections.  The patient understands that monitoring is required including baseline LFTs, Creatinine, possible TPMP genotyping and weekly CBCs for the first month and then every 2 weeks thereafter.  The patient verbalized understanding of the proper use and possible adverse effects of azathioprine.  All of the patient's questions and concerns were addressed.
Dutasteride Male Counseling: Dustasteride Counseling:  I discussed with the patient the risks of use of dutasteride including but not limited to decreased libido, decreased ejaculate volume, and gynecomastia. Women who can become pregnant should not handle medication.  All of the patient's questions and concerns were addressed.
Protopic Pregnancy And Lactation Text: This medication is Pregnancy Category C. It is unknown if this medication is excreted in breast milk when applied topically.
SSKI Counseling:  I discussed with the patient the risks of SSKI including but not limited to thyroid abnormalities, metallic taste, GI upset, fever, headache, acne, arthralgias, paraesthesias, lymphadenopathy, easy bleeding, arrhythmias, and allergic reaction.
Dupixent Counseling: I discussed with the patient the risks of dupilumab including but not limited to eye infection and irritation, cold sores, injection site reactions, worsening of asthma, allergic reactions and increased risk of parasitic infection.  Live vaccines should be avoided while taking dupilumab. Dupilumab will also interact with certain medications such as warfarin and cyclosporine. The patient understands that monitoring is required and they must alert us or the primary physician if symptoms of infection or other concerning signs are noted.
Xolair Pregnancy And Lactation Text: This medication is Pregnancy Category B and is considered safe during pregnancy. This medication is excreted in breast milk.
Eucrisa Counseling: Patient may experience a mild burning sensation during topical application. Eucrisa is not approved in children less than 2 years of age.
Bactrim Counseling:  I discussed with the patient the risks of sulfa antibiotics including but not limited to GI upset, allergic reaction, drug rash, diarrhea, dizziness, photosensitivity, and yeast infections.  Rarely, more serious reactions can occur including but not limited to aplastic anemia, agranulocytosis, methemoglobinemia, blood dyscrasias, liver or kidney failure, lung infiltrates or desquamative/blistering drug rashes.
Low Dose Naltrexone Counseling- I discussed with the patient the potential risks and side effects of low dose naltrexone including but not limited to: more vivid dreams, headaches, nausea, vomiting, abdominal pain, fatigue, dizziness, and anxiety.
Topical Retinoid counseling:  Patient advised to apply a pea-sized amount only at bedtime and wait 30 minutes after washing their face before applying.  If too drying, patient may add a non-comedogenic moisturizer. The patient verbalized understanding of the proper use and possible adverse effects of retinoids.  All of the patient's questions and concerns were addressed.
Oral Minoxidil Counseling- I discussed with the patient the risks of oral minoxidil including but not limited to shortness of breath, swelling of the feet or ankles, dizziness, lightheadedness, unwanted hair growth and allergic reaction.  The patient verbalized understanding of the proper use and possible adverse effects of oral minoxidil.  All of the patient's questions and concerns were addressed.
Minoxidil Pregnancy And Lactation Text: This medication has not been assigned a Pregnancy Risk Category but animal studies failed to show danger with the topical medication. It is unknown if the medication is excreted in breast milk.
Methotrexate Pregnancy And Lactation Text: This medication is Pregnancy Category X and is known to cause fetal harm. This medication is excreted in breast milk.
Sotyktu Pregnancy And Lactation Text: There is insufficient data to evaluate whether or not Sotyktu is safe to use during pregnancy.? ?It is not known if Sotyktu passes into breast milk and whether or not it is safe to use when breastfeeding.??
Calcipotriene Counseling:  I discussed with the patient the risks of calcipotriene including but not limited to erythema, scaling, itching, and irritation.
Qbrexza Counseling:  I discussed with the patient the risks of Qbrexza including but not limited to headache, mydriasis, blurred vision, dry eyes, nasal dryness, dry mouth, dry throat, dry skin, urinary hesitation, and constipation.  Local skin reactions including erythema, burning, stinging, and itching can also occur.
Sski Pregnancy And Lactation Text: This medication is Pregnancy Category D and isn't considered safe during pregnancy. It is excreted in breast milk.
Doxepin Pregnancy And Lactation Text: This medication is Pregnancy Category C and it isn't known if it is safe during pregnancy. It is also excreted in breast milk and breast feeding isn't recommended.
Isotretinoin Pregnancy And Lactation Text: This medication is Pregnancy Category X and is considered extremely dangerous during pregnancy. It is unknown if it is excreted in breast milk.
Dapsone Counseling: I discussed with the patient the risks of dapsone including but not limited to hemolytic anemia, agranulocytosis, rashes, methemoglobinemia, kidney failure, peripheral neuropathy, headaches, GI upset, and liver toxicity.  Patients who start dapsone require monitoring including baseline LFTs and weekly CBCs for the first month, then every month thereafter.  The patient verbalized understanding of the proper use and possible adverse effects of dapsone.  All of the patient's questions and concerns were addressed.
Dutasteride Pregnancy And Lactation Text: This medication is absolutely contraindicated in women, especially during pregnancy and breast feeding. Feminization of male fetuses is possible if taking while pregnant.
Erythromycin Pregnancy And Lactation Text: This medication is Pregnancy Category B and is considered safe during pregnancy. It is also excreted in breast milk.
Winlevi Counseling:  I discussed with the patient the risks of topical clascoterone including but not limited to erythema, scaling, itching, and stinging. Patient voiced their understanding.
Opioid Counseling: I discussed with the patient the potential side effects of opioids including but not limited to addiction, altered mental status, and depression. I stressed avoiding alcohol, benzodiazepines, muscle relaxants and sleep aids unless specifically okayed by a physician. The patient verbalized understanding of the proper use and possible adverse effects of opioids. All of the patient's questions and concerns were addressed. They were instructed to flush the remaining pills down the toilet if they did not need them for pain.
Azathioprine Pregnancy And Lactation Text: This medication is Pregnancy Category D and isn't considered safe during pregnancy. It is unknown if this medication is excreted in breast milk.
Dupixent Pregnancy And Lactation Text: This medication likely crosses the placenta but the risk for the fetus is uncertain. This medication is excreted in breast milk.
Oral Minoxidil Pregnancy And Lactation Text: This medication should only be used when clearly needed if you are pregnant, attempting to become pregnant or breast feeding.
Aklief counseling:  Patient advised to apply a pea-sized amount only at bedtime and wait 30 minutes after washing their face before applying.  If too drying, patient may add a non-comedogenic moisturizer.  The most commonly reported side effects including irritation, redness, scaling, dryness, stinging, burning, itching, and increased risk of sunburn.  The patient verbalized understanding of the proper use and possible adverse effects of retinoids.  All of the patient's questions and concerns were addressed.
Low Dose Naltrexone Pregnancy And Lactation Text: Naltrexone is pregnancy category C.  There have been no adequate and well-controlled studies in pregnant women.  It should be used in pregnancy only if the potential benefit justifies the potential risk to the fetus.   Limited data indicates that naltrexone is minimally excreted into breastmilk.
Bactrim Pregnancy And Lactation Text: This medication is Pregnancy Category D and is known to cause fetal risk.  It is also excreted in breast milk.
Calcipotriene Pregnancy And Lactation Text: The use of this medication during pregnancy or lactation is not recommended as there is insufficient data.
Stelara Counseling:  I discussed with the patient the risks of ustekinumab including but not limited to immunosuppression, malignancy, posterior leukoencephalopathy syndrome, and serious infections.  The patient understands that monitoring is required including a PPD at baseline and must alert us or the primary physician if symptoms of infection or other concerning signs are noted.
Mirvaso Counseling: Mirvaso is a topical medication which can decrease superficial blood flow where applied. Side effects are uncommon and include stinging, redness and allergic reactions.
Prednisone Counseling:  I discussed with the patient the risks of prolonged use of prednisone including but not limited to weight gain, insomnia, osteoporosis, mood changes, diabetes, susceptibility to infection, glaucoma and high blood pressure.  In cases where prednisone use is prolonged, patients should be monitored with blood pressure checks, serum glucose levels and an eye exam.  Additionally, the patient may need to be placed on GI prophylaxis, PCP prophylaxis, and calcium and vitamin D supplementation and/or a bisphosphonate.  The patient verbalized understanding of the proper use and the possible adverse effects of prednisone.  All of the patient's questions and concerns were addressed.
Xeljanz Counseling: I discussed with the patient the risks of Xeljanz therapy including increased risk of infection, liver issues, headache, diarrhea, or cold symptoms. Live vaccines should be avoided. They were instructed to call if they have any problems.
High Dose Vitamin A Counseling: Side effects reviewed, pt to contact office should one occur.
Tetracycline Counseling: Patient counseled regarding possible photosensitivity and increased risk for sunburn.  Patient instructed to avoid sunlight, if possible.  When exposed to sunlight, patients should wear protective clothing, sunglasses, and sunscreen.  The patient was instructed to call the office immediately if the following severe adverse effects occur:  hearing changes, easy bruising/bleeding, severe headache, or vision changes.  The patient verbalized understanding of the proper use and possible adverse effects of tetracycline.  All of the patient's questions and concerns were addressed. Patient understands to avoid pregnancy while on therapy due to potential birth defects.
Dapsone Pregnancy And Lactation Text: This medication is Pregnancy Category C and is not considered safe during pregnancy or breast feeding.
Qbrexza Pregnancy And Lactation Text: There is no available data on Qbrexza use in pregnant women.  There is no available data on Qbrexza use in lactation.
Thalidomide Counseling: I discussed with the patient the risks of thalidomide including but not limited to birth defects, anxiety, weakness, chest pain, dizziness, cough and severe allergy.
Hydroxyzine Counseling: Patient advised that the medication is sedating and not to drive a car after taking this medication.  Patient informed of potential adverse effects including but not limited to dry mouth, urinary retention, and blurry vision.  The patient verbalized understanding of the proper use and possible adverse effects of hydroxyzine.  All of the patient's questions and concerns were addressed.
Ketoconazole Counseling:   Patient counseled regarding improving absorption with orange juice.  Adverse effects include but are not limited to breast enlargement, headache, diarrhea, nausea, upset stomach, liver function test abnormalities, taste disturbance, and stomach pain.  There is a rare possibility of liver failure that can occur when taking ketoconazole. The patient understands that monitoring of LFTs may be required, especially at baseline. The patient verbalized understanding of the proper use and possible adverse effects of ketoconazole.  All of the patient's questions and concerns were addressed.
Topical Metronidazole Counseling: Metronidazole is a topical antibiotic medication. You may experience burning, stinging, redness, or allergic reactions.  Please call our office if you develop any problems from using this medication.
Finasteride Male Counseling: Finasteride Counseling:  I discussed with the patient the risks of use of finasteride including but not limited to decreased libido, decreased ejaculate volume, gynecomastia, and depression. Women should not handle medication.  All of the patient's questions and concerns were addressed.
Opioid Pregnancy And Lactation Text: These medications can lead to premature delivery and should be avoided during pregnancy. These medications are also present in breast milk in small amounts.
Erivedge Counseling- I discussed with the patient the risks of Erivedge including but not limited to nausea, vomiting, diarrhea, constipation, weight loss, changes in the sense of taste, decreased appetite, muscle spasms, and hair loss.  The patient verbalized understanding of the proper use and possible adverse effects of Erivedge.  All of the patient's questions and concerns were addressed.
Cantharidin Counseling:  I discussed with the patient the risks of Cantharidin including but not limited to pain, redness, burning, itching, and blistering.
Metronidazole Counseling:  I discussed with the patient the risks of metronidazole including but not limited to seizures, nausea/vomiting, a metallic taste in the mouth, nausea/vomiting and severe allergy.
Tazorac Counseling:  Patient advised that medication is irritating and drying.  Patient may need to apply sparingly and wash off after an hour before eventually leaving it on overnight.  The patient verbalized understanding of the proper use and possible adverse effects of tazorac.  All of the patient's questions and concerns were addressed.
Enbrel Counseling:  I discussed with the patient the risks of etanercept including but not limited to myelosuppression, immunosuppression, autoimmune hepatitis, demyelinating diseases, lymphoma, and infections.  The patient understands that monitoring is required including a PPD at baseline and must alert us or the primary physician if symptoms of infection or other concerning signs are noted.
Winlevi Pregnancy And Lactation Text: This medication is considered safe during pregnancy and breastfeeding.
Rituxan Counseling:  I discussed with the patient the risks of Rituxan infusions. Side effects can include infusion reactions, severe drug rashes including mucocutaneous reactions, reactivation of latent hepatitis and other infections and rarely progressive multifocal leukoencephalopathy.  All of the patient's questions and concerns were addressed.
Niacinamide Counseling: I recommended taking niacin or niacinamide, also know as vitamin B3, twice daily. Recent evidence suggests that taking vitamin B3 (500 mg twice daily) can reduce the risk of actinic keratoses and non-melanoma skin cancers. Side effects of vitamin B3 include flushing and headache.
Cellcept Counseling:  I discussed with the patient the risks of mycophenolate mofetil including but not limited to infection/immunosuppression, GI upset, hypokalemia, hypercholesterolemia, bone marrow suppression, lymphoproliferative disorders, malignancy, GI ulceration/bleed/perforation, colitis, interstitial lung disease, kidney failure, progressive multifocal leukoencephalopathy, and birth defects.  The patient understands that monitoring is required including a baseline creatinine and regular CBC testing. In addition, patient must alert us immediately if symptoms of infection or other concerning signs are noted.
Otezla Counseling: The side effects of Otezla were discussed with the patient, including but not limited to worsening or new depression, weight loss, diarrhea, nausea, upper respiratory tract infection, and headache. Patient instructed to call the office should any adverse effect occur.  The patient verbalized understanding of the proper use and possible adverse effects of Otezla.  All the patient's questions and concerns were addressed.
Cephalexin Counseling: I counseled the patient regarding use of cephalexin as an antibiotic for prophylactic and/or therapeutic purposes. Cephalexin (commonly prescribed under brand name Keflex) is a cephalosporin antibiotic which is active against numerous classes of bacteria, including most skin bacteria. Side effects may include nausea, diarrhea, gastrointestinal upset, rash, hives, yeast infections, and in rare cases, hepatitis, kidney disease, seizures, fever, confusion, neurologic symptoms, and others. Patients with severe allergies to penicillin medications are cautioned that there is about a 10% incidence of cross-reactivity with cephalosporins. When possible, patients with penicillin allergies should use alternatives to cephalosporins for antibiotic therapy.
Aklief Pregnancy And Lactation Text: It is unknown if this medication is safe to use during pregnancy.  It is unknown if this medication is excreted in breast milk.  Breastfeeding women should use the topical cream on the smallest area of the skin for the shortest time needed while breastfeeding.  Do not apply to nipple and areola.
Cantharidin Pregnancy And Lactation Text: This medication has not been proven safe during pregnancy. It is unknown if this medication is excreted in breast milk.
Hydroquinone Counseling:  Patient advised that medication may result in skin irritation, lightening (hypopigmentation), dryness, and burning.  In the event of skin irritation, the patient was advised to reduce the amount of the drug applied or use it less frequently.  Rarely, spots that are treated with hydroquinone can become darker (pseudoochronosis).  Should this occur, patient instructed to stop medication and call the office. The patient verbalized understanding of the proper use and possible adverse effects of hydroquinone.  All of the patient's questions and concerns were addressed.
Adbry Counseling: I discussed with the patient the risks of tralokinumab including but not limited to eye infection and irritation, cold sores, injection site reactions, worsening of asthma, allergic reactions and increased risk of parasitic infection.  Live vaccines should be avoided while taking tralokinumab. The patient understands that monitoring is required and they must alert us or the primary physician if symptoms of infection or other concerning signs are noted.
Topical Metronidazole Pregnancy And Lactation Text: This medication is Pregnancy Category B and considered safe during pregnancy.  It is also considered safe to use while breastfeeding.
Xelcarringtonz Pregnancy And Lactation Text: This medication is Pregnancy Category D and is not considered safe during pregnancy.  The risk during breast feeding is also uncertain.
Gabapentin Counseling: I discussed with the patient the risks of gabapentin including but not limited to dizziness, somnolence, fatigue and ataxia.
Mirvaso Pregnancy And Lactation Text: This medication has not been assigned a Pregnancy Risk Category. It is unknown if the medication is excreted in breast milk.
Finasteride Pregnancy And Lactation Text: This medication is absolutely contraindicated during pregnancy. It is unknown if it is excreted in breast milk.
High Dose Vitamin A Pregnancy And Lactation Text: High dose vitamin A therapy is contraindicated during pregnancy and breast feeding.
Rhofade Counseling: Rhofade is a topical medication which can decrease superficial blood flow where applied. Side effects are uncommon and include stinging, redness and allergic reactions.
Ketoconazole Pregnancy And Lactation Text: This medication is Pregnancy Category C and it isn't know if it is safe during pregnancy. It is also excreted in breast milk and breast feeding isn't recommended.
Hydroxyzine Pregnancy And Lactation Text: This medication is not safe during pregnancy and should not be taken. It is also excreted in breast milk and breast feeding isn't recommended.
Metronidazole Pregnancy And Lactation Text: This medication is Pregnancy Category B and considered safe during pregnancy.  It is also excreted in breast milk.
Rituxan Pregnancy And Lactation Text: This medication is Pregnancy Category C and it isn't know if it is safe during pregnancy. It is unknown if this medication is excreted in breast milk but similar antibodies are known to be excreted.
5-Fu Counseling: 5-Fluorouracil Counseling:  I discussed with the patient the risks of 5-fluorouracil including but not limited to erythema, scaling, itching, weeping, crusting, and pain.
Azelaic Acid Counseling: Patient counseled that medicine may cause skin irritation and to avoid applying near the eyes.  In the event of skin irritation, the patient was advised to reduce the amount of the drug applied or use it less frequently.   The patient verbalized understanding of the proper use and possible adverse effects of azelaic acid.  All of the patient's questions and concerns were addressed.
Niacinamide Pregnancy And Lactation Text: These medications are considered safe during pregnancy.
Tazorac Pregnancy And Lactation Text: This medication is not safe during pregnancy. It is unknown if this medication is excreted in breast milk.
VTAMA Counseling: I discussed with the patient that VTAMA is not for use in the eyes, mouth or mouth. They should call the office if they develop any signs of allergic reactions to VTAMA. The patient verbalized understanding of the proper use and possible adverse effects of VTAMA.  All of the patient's questions and concerns were addressed.
Cephalexin Pregnancy And Lactation Text: This medication is Pregnancy Category B and considered safe during pregnancy.  It is also excreted in breast milk but can be used safely for shorter doses.
Taltz Counseling: I discussed with the patient the risks of ixekizumab including but not limited to immunosuppression, serious infections, worsening of inflammatory bowel disease and drug reactions.  The patient understands that monitoring is required including a PPD at baseline and must alert us or the primary physician if symptoms of infection or other concerning signs are noted.
Arava Counseling:  Patient counseled regarding adverse effects of Arava including but not limited to nausea, vomiting, abnormalities in liver function tests. Patients may develop mouth sores, rash, diarrhea, and abnormalities in blood counts. The patient understands that monitoring is required including LFTs and blood counts.  There is a rare possibility of scarring of the liver and lung problems that can occur when taking methotrexate. Persistent nausea, loss of appetite, pale stools, dark urine, cough, and shortness of breath should be reported immediately. Patient advised to discontinue Arava treatment and consult with a physician prior to attempting conception. The patient will have to undergo a treatment to eliminate Arava from the body prior to conception.
Otezla Pregnancy And Lactation Text: This medication is Pregnancy Category C and it isn't known if it is safe during pregnancy. It is unknown if it is excreted in breast milk.
Adbry Pregnancy And Lactation Text: It is unknown if this medication will adversely affect pregnancy or breast feeding.
Topical Steroids Counseling: I discussed with the patient that prolonged use of topical steroids can result in the increased appearance of superficial blood vessels (telangiectasias), lightening (hypopigmentation) and thinning of the skin (atrophy).  Patient understands to avoid using high potency steroids in skin folds, the groin or the face.  The patient verbalized understanding of the proper use and possible adverse effects of topical steroids.  All of the patient's questions and concerns were addressed.
Birth Control Pills Counseling: Birth Control Pill Counseling: I discussed with the patient the potential side effects of OCPs including but not limited to increased risk of stroke, heart attack, thrombophlebitis, deep venous thrombosis, hepatic adenomas, breast changes, GI upset, headaches, and depression.  The patient verbalized understanding of the proper use and possible adverse effects of OCPs. All of the patient's questions and concerns were addressed.
Libtayo Counseling- I discussed with the patient the risks of Libtayo including but not limited to nausea, vomiting, diarrhea, and bone or muscle pain.  The patient verbalized understanding of the proper use and possible adverse effects of Libtayo.  All of the patient's questions and concerns were addressed.
Opzelura Counseling:  I discussed with the patient the risks of Opzelura including but not limited to nasopharngitis, bronchitis, ear infection, eosinophila, hives, diarrhea, folliculitis, tonsillitis, and rhinorrhea.  Taken orally, this medication has been linked to serious infections; higher rate of mortality; malignancy and lymphoproliferative disorders; major adverse cardiovascular events; thrombosis; thrombocytopenia, anemia, and neutropenia; and lipid elevations.
Terbinafine Counseling: Patient counseling regarding adverse effects of terbinafine including but not limited to headache, diarrhea, rash, upset stomach, liver function test abnormalities, itching, taste/smell disturbance, nausea, abdominal pain, and flatulence.  There is a rare possibility of liver failure that can occur when taking terbinafine.  The patient understands that a baseline LFT and kidney function test may be required. The patient verbalized understanding of the proper use and possible adverse effects of terbinafine.  All of the patient's questions and concerns were addressed.
Cyclophosphamide Counseling:  I discussed with the patient the risks of cyclophosphamide including but not limited to hair loss, hormonal abnormalities, decreased fertility, abdominal pain, diarrhea, nausea and vomiting, bone marrow suppression and infection. The patient understands that monitoring is required while taking this medication.
Siliq Counseling:  I discussed with the patient the risks of Siliq including but not limited to new or worsening depression, suicidal thoughts and behavior, immunosuppression, malignancy, posterior leukoencephalopathy syndrome, and serious infections.  The patient understands that monitoring is required including a PPD at baseline and must alert us or the primary physician if symptoms of infection or other concerning signs are noted. There is also a special program designed to monitor depression which is required with Siliq.
Nsaids Counseling: NSAID Counseling: I discussed with the patient that NSAIDs should be taken with food. Prolonged use of NSAIDs can result in the development of stomach ulcers.  Patient advised to stop taking NSAIDs if abdominal pain occurs.  The patient verbalized understanding of the proper use and possible adverse effects of NSAIDs.  All of the patient's questions and concerns were addressed.
Cibinqo Counseling: I discussed with the patient the risks of Cibinqo therapy including but not limited to common cold, nausea, headache, cold sores, increased blood CPK levels, dizziness, UTIs, fatigue, acne, and vomitting. Live vaccines should be avoided.  This medication has been linked to serious infections; higher rate of mortality; malignancy and lymphoproliferative disorders; major adverse cardiovascular events; thrombosis; thrombocytopenia and lymphopenia; lipid elevations; and retinal detachment.
Acitretin Counseling:  I discussed with the patient the risks of acitretin including but not limited to hair loss, dry lips/skin/eyes, liver damage, hyperlipidemia, depression/suicidal ideation, photosensitivity.  Serious rare side effects can include but are not limited to pancreatitis, pseudotumor cerebri, bony changes, clot formation/stroke/heart attack.  Patient understands that alcohol is contraindicated since it can result in liver toxicity and significantly prolong the elimination of the drug by many years.
Minocycline Counseling: Patient advised regarding possible photosensitivity and discoloration of the teeth, skin, lips, tongue and gums.  Patient instructed to avoid sunlight, if possible.  When exposed to sunlight, patients should wear protective clothing, sunglasses, and sunscreen.  The patient was instructed to call the office immediately if the following severe adverse effects occur:  hearing changes, easy bruising/bleeding, severe headache, or vision changes.  The patient verbalized understanding of the proper use and possible adverse effects of minocycline.  All of the patient's questions and concerns were addressed.
Tranexamic Acid Counseling:  Patient advised of the small risk of bleeding problems with tranexamic acid. They were also instructed to call if they developed any nausea, vomiting or diarrhea. All of the patient's questions and concerns were addressed.

## 2023-08-14 NOTE — PROCEDURE: ADDITIONAL NOTES
Render Risk Assessment In Note?: no
Additional Notes: Provided sample of [facility]-pt will contact us if he wants Rx. He states BP has over-dried his skin in the past.
Detail Level: Detailed

## 2023-08-14 NOTE — HPI: SKIN LESION
Is This A New Presentation, Or A Follow-Up?: Growths
How Severe Is Your Skin Lesion?: moderate
Has Your Skin Lesion Been Treated?: not been treated
Is This A New Presentation, Or A Follow-Up?: Growth

## 2023-08-14 NOTE — PROCEDURE: LIQUID NITROGEN
Spray Paint Text: The liquid nitrogen was applied to the skin utilizing a spray paint frosting technique.
Consent: The patient's consent was obtained including but not limited to risks of crusting, scabbing, blistering, scarring, darker or lighter pigmentary change, recurrence, incomplete removal and infection.
Medical Necessity Information: It is in your best interest to select a reason for this procedure from the list below. All of these items fulfill various CMS LCD requirements except the new and changing color options.
Render Note In Bullet Format When Appropriate: No
Detail Level: Detailed
Duration Of Freeze Thaw-Cycle (Seconds): 0
Medical Necessity Clause: This procedure was medically necessary because the lesions that were
Show Spray Paint Technique Variable?: Yes
Post-Care Instructions: I reviewed with the patient in detail post-care instructions. Patient is to wear sunprotection, and avoid picking at any of the treated lesions. Pt may apply Vaseline to crusted or scabbing areas.

## 2023-08-18 PROCEDURE — RXMED WILLOW AMBULATORY MEDICATION CHARGE: Performed by: NURSE PRACTITIONER

## 2023-08-18 RX ORDER — AZELAIC ACID 0.15 G/G
GEL TOPICAL DAILY
Qty: 50 | Refills: 3 | Status: ERX

## 2023-08-31 ENCOUNTER — PHARMACY VISIT (OUTPATIENT)
Dept: PHARMACY | Facility: MEDICAL CENTER | Age: 48
End: 2023-08-31
Payer: COMMERCIAL

## 2023-11-20 ENCOUNTER — OFFICE VISIT (OUTPATIENT)
Dept: MEDICAL GROUP | Facility: MEDICAL CENTER | Age: 48
End: 2023-11-20
Payer: COMMERCIAL

## 2023-11-20 VITALS
WEIGHT: 227 LBS | HEART RATE: 73 BPM | BODY MASS INDEX: 29.13 KG/M2 | SYSTOLIC BLOOD PRESSURE: 118 MMHG | OXYGEN SATURATION: 95 % | RESPIRATION RATE: 16 BRPM | TEMPERATURE: 97.3 F | DIASTOLIC BLOOD PRESSURE: 84 MMHG | HEIGHT: 74 IN

## 2023-11-20 DIAGNOSIS — H53.9 VISION CHANGES: ICD-10-CM

## 2023-11-20 DIAGNOSIS — Z00.00 PREVENTATIVE HEALTH CARE: Chronic | ICD-10-CM

## 2023-11-20 DIAGNOSIS — J30.1 ALLERGIC RHINITIS DUE TO POLLEN, UNSPECIFIED SEASONALITY: ICD-10-CM

## 2023-11-20 DIAGNOSIS — H93.12 TINNITUS OF LEFT EAR: ICD-10-CM

## 2023-11-20 DIAGNOSIS — Z23 NEEDS FLU SHOT: ICD-10-CM

## 2023-11-20 DIAGNOSIS — Z23 NEED FOR DIPHTHERIA-TETANUS-PERTUSSIS (TDAP) VACCINE: ICD-10-CM

## 2023-11-20 PROCEDURE — 90686 IIV4 VACC NO PRSV 0.5 ML IM: CPT | Performed by: INTERNAL MEDICINE

## 2023-11-20 PROCEDURE — 90715 TDAP VACCINE 7 YRS/> IM: CPT | Performed by: INTERNAL MEDICINE

## 2023-11-20 PROCEDURE — 3074F SYST BP LT 130 MM HG: CPT | Performed by: INTERNAL MEDICINE

## 2023-11-20 PROCEDURE — 90471 IMMUNIZATION ADMIN: CPT | Performed by: INTERNAL MEDICINE

## 2023-11-20 PROCEDURE — 99214 OFFICE O/P EST MOD 30 MIN: CPT | Mod: 25 | Performed by: INTERNAL MEDICINE

## 2023-11-20 PROCEDURE — 90472 IMMUNIZATION ADMIN EACH ADD: CPT | Performed by: INTERNAL MEDICINE

## 2023-11-20 PROCEDURE — 3079F DIAST BP 80-89 MM HG: CPT | Performed by: INTERNAL MEDICINE

## 2023-11-20 ASSESSMENT — FIBROSIS 4 INDEX: FIB4 SCORE: 0.81

## 2023-11-20 ASSESSMENT — PATIENT HEALTH QUESTIONNAIRE - PHQ9: CLINICAL INTERPRETATION OF PHQ2 SCORE: 0

## 2023-11-20 NOTE — PROGRESS NOTES
Subjective     Gurmeet Mercer is a 48 y.o. male who presents with Follow-Up (Tinnitis/vision changes)            HPI           Patient has had tinnitus in the past when his surrounding environment was quiet, at those times the high pitched buzzing sensation will be more noticeable and he could notice that nightly when things are quiet.  However on recent drive to [place] around November 4, the left tinnitus or buzzing became much more noticeable alternating between high and lower pitched sounds, constant in nature, some slight decrease in left hearing, no dizziness, occasional lightheadedness, no ear pain, no recurrent ear infections or history of ear infections, no recent head cold or sinus congestion although has had some nasal congestion periodically and did have some body aches that resolved.  Does grind his teeth but has tried a mouthguard in the past which made his biting worse.  Also has had chronic visual changes, has seen ophthalmology in the past  Continues to work at [employer], three 12-hour shifts weekly although work has been a bit more stressful and busy as [employer] has cut staffing.  He has not been able to exercise regularly, had been off his diet on his recent trip to [place] in [place] to  a new puppy plus it was his birthday at the start of the month.  Is due for his tetanus and flu vaccine  Medications, allergies, medical history, surgical history, social history, family history  reviewed and updated          Current Outpatient Medications   Medication Sig Dispense Refill    Azelaic Acid 15 % Gel Apply to thin film to  facial acne once or twice daily. 50 g 3    triamcinolone (NASACORT ALLERGY 24HR) 55 MCG/ACT nasal inhaler Administer 2 Sprays into affected nostril(S) every day. 10.8 mL 3    ipratropium (ATROVENT) 0.03 % Solution Administer 2 Sprays into affected nostril(S) every 12 hours. 30 mL 3    sildenafil (REVATIO) 20 MG tablet Take between 1 to 5 tabs one hour prior  to activity, max 5 per day 30 Tablet 5    meclizine (ANTIVERT) 25 MG Tab Take 1 Tablet by mouth 3 times a day as needed for Vertigo. 30 Tablet 0    acetaminophen (TYLENOL) 500 MG Tab Take 500-1,000 mg by mouth every 6 hours as needed.      Ibuprofen 200 MG Cap Take  by mouth.       No current facility-administered medications for this visit.                 Allergic rhinitis  8/1/17 start atrovent nasal, has tried astelin and nasonex previously   4/5/22 tried astelin, will try atrovent and allegra  Astelin causes funny taste, zyrtec causes mood changes     Dyslipidemia  9/11 chol 201,trig 179,hdl 37,ldl 128 start fish oil 2 bid  8/12 chol 183,trig 165,hdl 36,ldl 114  9/14/13 chol 208,trig 308,hdl 37,ldl 109  9/4/14 chol 210,trig 329,hdl 37,ldl 107  6/14/16 chol 220,trig 315,hdl 40,ldl 117  9/19/17 chol 210,trig 240,hdl 42,ldl 120  9/18/18 chol 204,trig 369,hdl 40,ldl 90  8/12/19 chol 215,trig 167,hdl 42,ldl 140  4/7/22 chol 232,trig 152,hdl 45,ldl 157     gerd  11/8/21 takes pepcid as needed  3/14/22 gerd with certain foods and at night, will try pepcid     history headache  8/1/17 tension headache, occasional blurry vision left eye, referral back to ophthalmology, trial of nortriptyline 10 mg question tension headache, atypical migraine  12/4/17 tried pamelor no benefit, trial of acupuncture, ophthalmology evaluation pending   3/15/18 dr.hale ophthalmology note, scintillating scotomata, likely complicated migraine recommend different of lactic agent, referral to neurology , trial of xiidra drops for dry eyes, severe amblyopia  11/30/18  neurology note headache imaging negative, no evidence of autoimmune disease although markers will be checked, associated vision changes, will schedule EEG  4/29/19 EEG video negative  5/7/19 neurology note daily headache, no criteria for migraine, trial of anaprox 550 mg as needed up to 3 times daily, visual changes no clear etiology, very infrequent, could  be migrainous epiphenomena, do not believe medication is worthwhile, consider ambulatory EEG in the future  9/27/21 left-sided facial tingling diagnosed with trigeminal neuralgia     History vision changes  4/29/19 EEG video negative  5/7/19 neurology note daily headache, no criteria for migraine, trial of anaprox 550 mg as needed up to 3 times daily, visual changes no clear etiology, very infrequent, could be migrainous epiphenomena, do not believe medication is worthwhile, consider ambulatory EEG in the future     History wrist pain  8/25/20 occupational medicine note right wrist burning sensation, x-ray ordered  8/25/20 x-ray right wrist negative  11/4/20 occupational medicine note MRI ordered, consider physiatry evaluation  11/19/20 physical therapy note  11/23/20 MRI right wrist without, dorsal radial ulnar strut and proximal triangular fibrocartilage tears with associated ganglion cyst 12 x 8 x 3 mm under the extensor carpi ulnaris, partial scapholunate ligament tear volar surface with 11 mm volar radial ganglion cyst  12/2/20 occupational medicine note referral to hand surgery  1/18/21  orthopedic note injection [facility] tendon sheath performed, continue work without limitations, follow-up 4 weeks  2/22/21  orthopedic note follow-up [facility] tendon sheath injection 1 month ago, wrist is doing well, close workmen's compensation case, could return to full duty     Low back pain  12/15/21 most recent flareup started by bending over and putting on his shoe  1/22/22 referral physical therapy, consider imaging, continue over-the-counter ibuprofen, trial of zanaflex, no need for imaging now, will complete FMLA  3/14/22 going to PT, on motrin 2 tabs prn, has not tried zanaflex yet, will try to compare the benefit with motrin  Tried skelaxin and flexeril previously     Preventative health  9/21/12 tdap  7/5/16 apnea link negative  6/7/19 colonoscopy per [facility] normal mucosa 2 mm cecum polyp pathology normal  "mucosa  4/7/22 testosterone 219  4/7/22 A1c 5.6%  4/7/22 psa 0.86  4/7/22 vit d 25  4/19/22 hep b third       Patient Active Problem List   Diagnosis    Family history of brain aneurysm    Preventative health care    Dyslipidemia    Erectile dysfunction    Refractive amblyopia    Allergic rhinitis    History of headache    History of visual changes    GERD (gastroesophageal reflux disease)    History of wrist pain    Low back pain         Depression Screening  Little interest or pleasure in doing things?  0 - not at all  Feeling down, depressed , or hopeless? 0 - not at all  Patient Health Questionnaire Score: 0            Patient Care Team:  Dheeraj Burr M.D. as PCP - General (Internal Medicine)      ROS           Objective     /84 (BP Location: Right arm, Patient Position: Sitting, BP Cuff Size: Adult)   Pulse 73   Temp 36.3 °C (97.3 °F)   Resp 16   Ht 1.88 m (6' 2\")   Wt 103 kg (227 lb)   SpO2 95%   BMI 29.15 kg/m²      Physical Exam  Vitals and nursing note reviewed.   Constitutional:       Appearance: Normal appearance.   HENT:      Head: Normocephalic and atraumatic.      Right Ear: Tympanic membrane and external ear normal. There is no impacted cerumen.      Left Ear: Tympanic membrane and external ear normal. There is no impacted cerumen.      Nose: No congestion.      Mouth/Throat:      Pharynx: No oropharyngeal exudate.   Eyes:      Conjunctiva/sclera: Conjunctivae normal.   Neck:      Comments: No bilateral TMJ tenderness  Cardiovascular:      Rate and Rhythm: Normal rate and regular rhythm.      Heart sounds: Normal heart sounds. No murmur heard.  Abdominal:      General: There is no distension.   Musculoskeletal:         General: No swelling.      Cervical back: Neck supple.   Neurological:      General: No focal deficit present.      Mental Status: He is alert.   Psychiatric:         Mood and Affect: Mood normal.         Behavior: Behavior normal.                             Assessment " & Plan        Assessment  #1 tinnitus left ear, he has had tinnitus previously but that was more of a high-pitched tinnitus which was mostly noticeable when his surroundings are quiet, however on his most recent trip to [place] a few weeks ago, experienced sudden onset of left tinnitus consisting of both higher and lower pitch type tinnitus but much more noticeable, some decreased hearing on the left side although not profound, no vertiginous type symptoms.  No focal deficits on exam    #2 occasional sinus congestion uses nasacort as needed which does not cause nosebleeds    #3 dyslipidemia diet controlled due for follow-up labs    #4 due for his tetanus vaccine    #5 history of visual disturbances has seen optometry and ophthalmology in the past        Plan  #1 flu vaccine today    #2 tdap also updated today    #3 Labs ordered previously, he can have that done fasting, orders are in the computer system    #4 MRI IAC to evaluate his tinnitus which has been chronic in nature, but changed in intensity and frequency recently, as it consists of both higher and lower pitch sounds and is much more noticeable    #5 referral ENT Dr. Christianson    #6 referral eye care professionals

## 2024-02-05 NOTE — MR AVS SNAPSHOT
"        Orlando Mercer   2017 9:20 AM   Office Visit   MRN: [id number]    Department:  [facility]   Dept Phone:  [phone]    Description:  Male : 1975   Provider:  Dheeraj Burr M.D.           Allergies as of 2017     Allergen Noted Reactions    Sulfa Drugs 2008         Vital Signs     Blood Pressure Pulse Temperature Height Weight Body Mass Index    128/74 mmHg 75 36.4 °C (97.5 °F) 1.854 m (6' 0.99\") 97.07 kg (214 lb) 28.24 kg/m2    Oxygen Saturation Smoking Status                95% Former Smoker          Basic Information     Date Of Birth Sex Race Ethnicity Preferred Language    1975 Male  Non- English      Problem List              ICD-10-CM Priority Class Noted - Resolved    Tension headache (Chronic) G44.209   2009 - Present    Family history of brain aneurysm    2010 - Present    Preventative health care (Chronic) Z00.00   2011 - Present    Dyslipidemia (Chronic) E78.5   2011 - Present    Erectile dysfunction N52.9   2015 - Present    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   2016 - Present      Health Maintenance        Date Due Completion Dates    IMM DTaP/Tdap/Td Vaccine (2 - Td) 2022            Current Immunizations     Hepatitis A Vaccine, Ped/Adol 2012    Hepatitis B Vaccine Non-Recombivax (Ped/Adol) 2009    Influenza TIV (IM) 2013, 11/3/2011    Influenza Vaccine Quad Inj (Pf) 10/13/2016    Influenza Vaccine Quad Inj (Preserved) 2015, 2015    Tdap Vaccine 2012    Tuberculin Skin Test 2013  1:32 PM, 7/15/2013 10:45 AM, 2012  1:25 PM, 2012 12:45 PM, 2011, 2009    Yellow Fever Vaccine 2013      Below and/or attached are the medications your provider expects you to take. Review all of your home medications and newly ordered medications with your provider and/or pharmacist. Follow medication instructions as directed by your provider and/or pharmacist. Please keep " Subjective:       Patient ID: Garcia Rashid is a . male.    Chief Complaint: here for physical examination and issues  below    HPIhere for phys: depression/anxiety dhx dr sheikh.     S/p hosp late 23 gi related. No cp. Defers card/tmst.gi recomm long term dailyppi    Hx b12 anemia due marylu    Hyperchol. He is not certain why offstatin. Ok with rsuming if lab indicates    Aorta atheroscl d/wd statin;cv risk 12%    Past Medical History:   Diagnosis Date    ADD (attention deficit disorder)     ? dx; via dr cam 2014(died12/14)    Anemia     Anxiety     B12 nutritional deficiency     Depression     olol/[facility]-dr sheikh (2019)    GERD (gastroesophageal reflux disease)     Hearing loss     hearing aids    Hypercholesterolemia 2/3/2022    PLMD (periodic limb movement disorder)     Restless leg syndrome     Restless legs 7/5/2013    Sleep apnea     Sleep apnea     Trouble in sleeping     Tubular adenoma     7/11 cscope    Ulcer      Past Surgical History:   Procedure Laterality Date    COLONOSCOPY      ESOPHAGOGASTRODUODENOSCOPY N/A 11/21/2023    Procedure: EGD (ESOPHAGOGASTRODUODENOSCOPY);  Surgeon: Dipti Person MD;  Location: [facility];  Service: Endoscopy;  Laterality: N/A;  hematemesis    TONSILLECTOMY  1967     Family History   Problem Relation Age of Onset    Arthritis Mother     Heart disease Mother     Alcohol abuse Father     Liver disease Father     Cancer Brother     Melanoma Neg Hx      Social History     Socioeconomic History    Marital status:     Number of children: 2   Occupational History     Employer: [employer]   Tobacco Use    Smoking status: Never    Smokeless tobacco: Never   Substance and Sexual Activity    Alcohol use: No    Drug use: No    Sexual activity: Never     Partners: Female   Social History Narrative        Retired from [employer]    Enjoys reading history ww2 history     Social Determinants of Health     Financial Resource Strain: Low Risk   (8/29/2023)    Overall Financial Resource Strain (CARDIA)     Difficulty of Paying Living Expenses: Not very hard   Food Insecurity: No Food Insecurity (8/29/2023)    Hunger Vital Sign     Worried About Running Out of Food in the Last Year: Never true     Ran Out of Food in the Last Year: Never true   Transportation Needs: No Transportation Needs (8/29/2023)    PRAPARE - Transportation     Lack of Transportation (Medical): No     Lack of Transportation (Non-Medical): No   Physical Activity: Insufficiently Active (8/29/2023)    Exercise Vital Sign     Days of Exercise per Week: 3 days     Minutes of Exercise per Session: 30 min   Stress: No Stress Concern Present (8/29/2023)    [place] [employer] - Occupational Stress Questionnaire     Feeling of Stress : Not at all   Social Connections: Socially Integrated (8/29/2023)    Social Connection and Isolation Panel [NHANES]     Frequency of Communication with Friends and Family: More than three times a week     Frequency of Social Gatherings with Friends and Family: More than three times a week     Attends Restoration Services: More than 4 times per year     Attends Club or Organization Meetings: More than 4 times per year     Marital Status:    Housing Stability: Unknown (8/29/2023)    Housing Stability Vital Sign     Unable to Pay for Housing in the Last Year: No     Unstable Housing in the Last Year: No             Review of Systems  Cardiovascular: no chest pain  Chest: no shortness of breath  Abd: no abd pain  Remainder review of systems negative    Objective:      Physical Exam   Constitutional: He is oriented to person, place, and time. He appears well-developed and well-nourished.   HENT:   Head: Normocephalic and atraumatic.   Right Ear: External ear normal.   Left Ear: External ear normal.   Nose: Nose normal.     Nl tms bilat  Eyes: Pupils are equal, round, and reactive to light. Conjunctivae and EOM are normal. No scleral icterus.  your medication list with you and share with your provider. Update the information when medications are discontinued, doses are changed, or new medications (including over-the-counter products) are added; and carry medication information at all times in the event of emergency situations     Allergies:  SULFA DRUGS - (reactions not documented)               Medications  Valid as of: April 06, 2017 - 10:22 AM    Generic Name Brand Name Tablet Size Instructions for use    .                 Medicines prescribed today were sent to:     [facility][id number] - [place], [place] - [address]    [address] [place] [address]    Phone: [phone] Fax: [phone]    Open 24 Hours?: No      Medication refill instructions:       If your prescription bottle indicates you have medication refills left, it is not necessary to call your provider’s office. Please contact your pharmacy and they will refill your medication.    If your prescription bottle indicates you do not have any refills left, you may request refills at any time through one of the following ways: The online [employer] Private system (except Urgent Care), by calling your provider’s office, or by asking your pharmacy to contact your provider’s office with a refill request. Medication refills are processed only during regular business hours and may not be available until the next business day. Your provider may request additional information or to have a follow-up visit with you prior to refilling your medication.   *Please Note: Medication refills are assigned a new Rx number when refilled electronically. Your pharmacy may indicate that no refills were authorized even though a new prescription for the same medication is available at the pharmacy. Please request the medicine by name with the pharmacy before contacting your provider for a refill.        Other Notes About Your Plan     4/4/16 continued pain above the left orbit only tender with palpation, has seen [facility],    Neck: Normal range of motion. Neck supple. Carotid bruit is not present.   Cardiovascular: Normal rate, regular rhythm and normal heart sounds. Exam reveals no gallop and no friction rub.   No murmur heard.  Pulmonary/Chest: Effort normal and breath sounds normal. He has no wheezes.   Abdominal: Soft. Bowel sounds are normal. He exhibits no distension and no mass. There is no hepatosplenomegaly. There is no tenderness. There is no rebound and no guarding.   Musculoskeletal: Normal range of motion. He exhibits no tenderness.   Lymphadenopathy:     He has no cervical adenopathy.   Neurological: He is alert and oriented to person, place, and time. No cranial nerve deficit. Coordination normal.   Skin: Skin is warm and dry. No rash noted. No erythema.   Psychiatric: He has a normal mood and affect. His behavior is normal. Judgment and thought content normal.   Nursing note and vitals reviewed.      Assessment:       1. Routine health maintenance        3. Severe major depression, single episode remission   4.        6. hx, unspecified B12 deficiency type    7. Screening for prostate cancer    8. Hypercholesteremia                          gerd  Plan:     Long term ppi  Statin Side effects and dosing discussed  *        F/u psych when due    Lab today  Also Lab 12 months and follow up after    Routine health maintenance    Moderate mixed hyperlipidemia not requiring statin therapy  -     Lipid Panel; Future; Expected date: 02/05/2024  -     Comprehensive Metabolic Panel; Future; Expected date: 02/04/2025  -     Lipid Panel; Future; Expected date: 02/04/2025    Aortic atherosclerosis    History of anemia due to vitamin B12 deficiency  -     Vitamin B12; Future; Expected date: 02/05/2024  -     Vitamin B12; Future; Expected date: 02/05/2025    Screening for prostate cancer  -     PSA, Screening; Future; Expected date: 02/05/2024  -     PSA, Screening; Future; Expected date: 02/04/2025    Major depressive disorder, single  negative examination, will get CT orbit  4/18/16 CT orbit sella without plus reconstruction negative, offered trial of neurontin he declines  6/14/16 goldstein apnea link eval snoring  7/5/16 apnea link negative             MyChart Access Code: Activation code not generated  Current MyChart Status: Active           episode, severe without psychotic features    Gastroesophageal reflux disease with hiatal hernia    Other orders  -     pravastatin (PRAVACHOL) 40 MG tablet; Take 1 tablet (40 mg total) by mouth once daily.  Dispense: 90 tablet; Refill: 3

## 2024-04-30 ENCOUNTER — OFFICE VISIT (OUTPATIENT)
Dept: MEDICAL GROUP | Facility: MEDICAL CENTER | Age: 49
End: 2024-04-30
Payer: COMMERCIAL

## 2024-04-30 ENCOUNTER — PHARMACY VISIT (OUTPATIENT)
Dept: PHARMACY | Facility: MEDICAL CENTER | Age: 49
End: 2024-04-30
Payer: COMMERCIAL

## 2024-04-30 VITALS
RESPIRATION RATE: 16 BRPM | TEMPERATURE: 98.9 F | WEIGHT: 216 LBS | BODY MASS INDEX: 28.63 KG/M2 | HEART RATE: 86 BPM | SYSTOLIC BLOOD PRESSURE: 116 MMHG | HEIGHT: 73 IN | OXYGEN SATURATION: 95 % | DIASTOLIC BLOOD PRESSURE: 76 MMHG

## 2024-04-30 DIAGNOSIS — J01.00 ACUTE NON-RECURRENT MAXILLARY SINUSITIS: ICD-10-CM

## 2024-04-30 DIAGNOSIS — J11.1 INFLUENZA-LIKE ILLNESS: ICD-10-CM

## 2024-04-30 LAB
FLUAV RNA SPEC QL NAA+PROBE: NEGATIVE
FLUBV RNA SPEC QL NAA+PROBE: NEGATIVE
RSV RNA SPEC QL NAA+PROBE: NEGATIVE
SARS-COV-2 RNA RESP QL NAA+PROBE: NEGATIVE

## 2024-04-30 PROCEDURE — RXMED WILLOW AMBULATORY MEDICATION CHARGE: Performed by: PHYSICIAN ASSISTANT

## 2024-04-30 RX ORDER — AMOXICILLIN AND CLAVULANATE POTASSIUM 875; 125 MG/1; MG/1
TABLET, FILM COATED ORAL
Qty: 14 TABLET | Refills: 0 | Status: SHIPPED | OUTPATIENT
Start: 2024-04-30

## 2024-04-30 RX ORDER — CODEINE PHOSPHATE AND GUAIFENESIN 10; 100 MG/5ML; MG/5ML
5 SOLUTION ORAL EVERY 6 HOURS PRN
Qty: 180 ML | Refills: 0 | Status: SHIPPED | OUTPATIENT
Start: 2024-04-30 | End: 2024-05-09

## 2024-04-30 NOTE — LETTER
April 30, 2024         Patient: Gurmeet Mercer   YOB: 1975   Date of Visit: 4/30/2024           To Whom it May Concern:    Gurmeet Mercer was seen in my clinic on 4/30/2024.  Please excuse from work on May 1 and if needed May 2nd and 3rd 2024.    If you have any questions or concerns, please don't hesitate to call.        Sincerely,           Diana Coleman P.A.-C.  Electronically Signed

## 2024-04-30 NOTE — PROGRESS NOTES
Subjective:     History of Present Illness  The patient presents for evaluation of flu-like symptoms.    The patient has been experiencing flu-like symptoms for several weeks. He recently returned from a trip to [facility], where his wife fell ill a week later. His symptoms have persisted for approximately 2 weeks, with a progressive worsening of symptoms yesterday. Despite initial improvement, he experienced a severe nocturnal cough last night. His symptoms include nasal congestion, cold sweats, fatigue, and shortness of breath, which occur in waves. Over the past 4 to 5 days, he has been resting due to a lack of energy. He experienced sinus pressure, facial pain, and cheek pain 2 days ago, but this has improved today. He suspects the presence of black mold at his workplace. He denies experiencing chest tightness. He has been self-medicating with DayQuil and NyQuil for approximately a week.   He is allergic to SULFA.      Current medicines (including changes today)  Current Outpatient Medications   Medication Sig Dispense Refill    amoxicillin-clavulanate (AUGMENTIN) 875-125 MG Tab Take one pill twice a day with food for a week 14 Tablet 0    guaifenesin-codeine (CHERATUSSIN AC) Solution oral solution Take 5 mL by mouth every 6 hours as needed for Cough for up to 9 days. 180 mL 0    Azelaic Acid 15 % Gel Apply to thin film to  facial acne once or twice daily. 50 g 3    triamcinolone (NASACORT ALLERGY 24HR) 55 MCG/ACT nasal inhaler Administer 2 Sprays into affected nostril(S) every day. 10.8 mL 3    ipratropium (ATROVENT) 0.03 % Solution Administer 2 Sprays into affected nostril(S) every 12 hours. 30 mL 3    sildenafil (REVATIO) 20 MG tablet Take between 1 to 5 tabs one hour prior to activity, max 5 per day 30 Tablet 5    acetaminophen (TYLENOL) 500 MG Tab Take 500-1,000 mg by mouth every 6 hours as needed.       No current facility-administered medications for this visit.     He  has a past medical history of Plantar  "fasciitis (6/8/2009) and Tension headache (6/5/2009).    ROS =  No chest pain, no shortness of breath, no abdominal pain  Positive ROS as per HPI.  All other systems reviewed and are negative.     Objective:     /76 (BP Location: Left arm, Patient Position: Sitting, BP Cuff Size: Adult)   Pulse 86   Temp 37.2 °C (98.9 °F)   Resp 16   Ht 1.854 m (6' 1\")   Wt 98 kg (216 lb)   SpO2 95%  Body mass index is 28.5 kg/m². =  Physical Exam  Lungs are clear to auscultation bilaterally. No wheezes, rales, or rhonchi in the lungs.  Constitutional: Alert, no distress.  Skin: Warm, dry, good turgor, no rashes in visible areas.  Eye: Equal, round and reactive, conjunctiva clear, lids normal.  ENMT: Lips without lesions, good dentition, oropharynx clear.  Neck: Trachea midline, no masses, no thyromegaly. No cervical or supraclavicular lymphadenopathy  Respiratory: Unlabored respiratory effort, lungs clear to auscultation, no wheezes, no ronchi.  Cardiovascular: Normal S1, S2, no murmur, no edema.  Abdomen: Soft, non-tender, no masses, no hepatosplenomegaly.  Psych: Alert and oriented x3, normal affect and mood.      Results          Assessment and Plan:   The following treatment plan was discussed    Assessment & Plan  1. Influenza-like illness accompanied by a worsening cough.  The patient's symptoms, which are both new and unstable, are progressively worsening. A prescription for Augmentin has been issued to address potential bacterial superinfection, particularly in terms of potential walking pneumonia or sinusitis. A chest x-ray has been ordered and a note for work has been provided for up to 3 days. Pending viral swabs, and the patient will be informed of the results upon receipt.    Obtained and reviewed patient utilization report from [facility] pharmacy database on 4/30/2024 1:42 PM  prior to writing prescription for controlled substance II, III or IV per [place] bill . Based on assessment of the report,my " physical exam if necessary and the patient's health problem, the prescription is medically necessary.     ORDERS:  1. Influenza-like illness    - POCT Cepheid CoV-2, Flu A/B, RSV - PCR  - DX-CHEST-2 VIEWS; Future  - guaifenesin-codeine (CHERATUSSIN AC) Solution oral solution; Take 5 mL by mouth every 6 hours as needed for Cough for up to 9 days.  Dispense: 180 mL; Refill: 0    2. Acute non-recurrent maxillary sinusitis    - amoxicillin-clavulanate (AUGMENTIN) 875-125 MG Tab; Take one pill twice a day with food for a week  Dispense: 14 Tablet; Refill: 0          Follow Up:  prn    Please note that this dictation was created using voice recognition software. I have made every reasonable attempt to correct obvious errors, but I expect that there are errors of grammar and possibly content that I did not discover before finalizing the note.      Attestation      Verbal consent was acquired by the patient to use [employer] ambient listening note generation during this visit Yes

## 2024-08-07 ENCOUNTER — OFFICE VISIT (OUTPATIENT)
Dept: URGENT CARE | Facility: CLINIC | Age: 49
End: 2024-08-07
Payer: COMMERCIAL

## 2024-08-07 ENCOUNTER — PHARMACY VISIT (OUTPATIENT)
Dept: PHARMACY | Facility: MEDICAL CENTER | Age: 49
End: 2024-08-07
Payer: COMMERCIAL

## 2024-08-07 VITALS
SYSTOLIC BLOOD PRESSURE: 126 MMHG | TEMPERATURE: 97.2 F | RESPIRATION RATE: 19 BRPM | HEIGHT: 73 IN | DIASTOLIC BLOOD PRESSURE: 84 MMHG | OXYGEN SATURATION: 95 % | WEIGHT: 219 LBS | HEART RATE: 80 BPM | BODY MASS INDEX: 29.03 KG/M2

## 2024-08-07 DIAGNOSIS — B96.89 ACUTE BACTERIAL SINUSITIS: ICD-10-CM

## 2024-08-07 DIAGNOSIS — J01.90 ACUTE BACTERIAL SINUSITIS: ICD-10-CM

## 2024-08-07 PROCEDURE — 99213 OFFICE O/P EST LOW 20 MIN: CPT | Performed by: NURSE PRACTITIONER

## 2024-08-07 PROCEDURE — 3079F DIAST BP 80-89 MM HG: CPT | Performed by: NURSE PRACTITIONER

## 2024-08-07 PROCEDURE — 3074F SYST BP LT 130 MM HG: CPT | Performed by: NURSE PRACTITIONER

## 2024-08-07 PROCEDURE — RXMED WILLOW AMBULATORY MEDICATION CHARGE: Performed by: NURSE PRACTITIONER

## 2024-08-07 RX ORDER — PREDNISONE 20 MG/1
TABLET ORAL
Qty: 10 TABLET | Refills: 0 | Status: SHIPPED | OUTPATIENT
Start: 2024-08-07

## 2024-08-07 ASSESSMENT — ENCOUNTER SYMPTOMS: SINUS PAIN: 1

## 2024-08-07 NOTE — PROGRESS NOTES
Subjective     Gurmeet Mercer is a 48 y.o. male who presents with Facial Pain (Right side pain cannot sleep last nigth.)            Facial Pain  This is a new problem. Episode onset: pt reports new onset of cold symptoms that started 6 days ago. reports a lot of sinus congestion, runny nose. new onset of severe right sided HA and sinus pain starting last night. +chills last night. only slight cough. nothing alleviates facial pain. Associated symptoms include congestion. He has tried acetaminophen (dayquil, nyquil and mucinex) for the symptoms. The treatment provided no relief.       Review of Systems   HENT:  Positive for congestion and sinus pain.    All other systems reviewed and are negative.         Past Medical History:   Diagnosis Date    Plantar fasciitis 6/8/2009    Tension headache 6/5/2009    History reviewed. No pertinent surgical history.   Social History     Socioeconomic History    Marital status:      Spouse name: Not on file    Number of children: Not on file    Years of education: Not on file    Highest education level: Not on file   Occupational History    Not on file   Tobacco Use    Smoking status: Former     Current packs/day: 1.00     Average packs/day: 1 pack/day for 10.0 years (10.0 ttl pk-yrs)     Types: Cigarettes    Smokeless tobacco: Never    Tobacco comments:     2006 quit   Vaping Use    Vaping status: Never Used   Substance and Sexual Activity    Alcohol use: Yes     Comment: rarely    Drug use: No    Sexual activity: Yes   Other Topics Concern    Not on file   Social History Narrative    Not on file     Social Determinants of Health     Financial Resource Strain: Not on file   Food Insecurity: Not on file   Transportation Needs: Not on file   Physical Activity: Not on file   Stress: Not on file   Social Connections: Not on file   Intimate Partner Violence: Not on file   Housing Stability: Not on file         Objective     /84   Pulse 80   Temp 36.2 °C (97.2  "°F) (Temporal)   Resp 19   Ht 1.854 m (6' 1\")   Wt 99.3 kg (219 lb)   SpO2 95%   BMI 28.89 kg/m²      Physical Exam  Vitals and nursing note reviewed.   Constitutional:       Appearance: Normal appearance.   HENT:      Head: Normocephalic and atraumatic.      Right Ear: Tympanic membrane and external ear normal.      Left Ear: Tympanic membrane and external ear normal.      Nose: Congestion present.      Right Sinus: Maxillary sinus tenderness present.      Mouth/Throat:      Mouth: Mucous membranes are moist.      Pharynx: Oropharynx is clear.   Eyes:      Extraocular Movements: Extraocular movements intact.      Pupils: Pupils are equal, round, and reactive to light.   Cardiovascular:      Rate and Rhythm: Normal rate and regular rhythm.   Pulmonary:      Effort: Pulmonary effort is normal.   Musculoskeletal:         General: Normal range of motion.      Cervical back: Normal range of motion and neck supple.   Skin:     General: Skin is warm and dry.      Capillary Refill: Capillary refill takes less than 2 seconds.   Neurological:      General: No focal deficit present.      Mental Status: He is alert and oriented to person, place, and time. Mental status is at baseline.   Psychiatric:         Mood and Affect: Mood normal.         Thought Content: Thought content normal.         Judgment: Judgment normal.                             Assessment & Plan        1. Acute bacterial sinusitis  - amoxicillin-clavulanate (AUGMENTIN) 875-125 MG Tab; Take 1 Tablet by mouth 2 times a day for 7 days.  Dispense: 14 Tablet; Refill: 0  - predniSONE (DELTASONE) 20 MG Tab; Take 2 tabs PO daily for 5 days  Dispense: 10 Tablet; Refill: 0       Take full course of abx and steroids  Continue OTC meds as needed for symptom relief  Use nasocort spray as directed  Encouraged rest and fluids  Supportive care, differential diagnoses, and indications for immediate follow-up discussed with patient.    Pathogenesis of diagnosis discussed " including typical length and natural progression.    Instructed to return to  or nearest emergency department if symptoms fail to improve, for any change in condition, further concerns, or new concerning symptoms.  Patient states understanding of the plan of care and discharge instructions.

## 2024-08-13 ENCOUNTER — APPOINTMENT (RX ONLY)
Dept: URBAN - METROPOLITAN AREA CLINIC 20 | Facility: CLINIC | Age: 49
Setting detail: DERMATOLOGY
End: 2024-08-13

## 2024-08-13 DIAGNOSIS — D22 MELANOCYTIC NEVI: ICD-10-CM

## 2024-08-13 DIAGNOSIS — D18.0 HEMANGIOMA: ICD-10-CM

## 2024-08-13 DIAGNOSIS — Z71.89 OTHER SPECIFIED COUNSELING: ICD-10-CM

## 2024-08-13 DIAGNOSIS — L85.3 XEROSIS CUTIS: ICD-10-CM

## 2024-08-13 DIAGNOSIS — L81.4 OTHER MELANIN HYPERPIGMENTATION: ICD-10-CM

## 2024-08-13 DIAGNOSIS — L82.1 OTHER SEBORRHEIC KERATOSIS: ICD-10-CM

## 2024-08-13 DIAGNOSIS — L71.8 OTHER ROSACEA: ICD-10-CM

## 2024-08-13 PROBLEM — D22.61 MELANOCYTIC NEVI OF RIGHT UPPER LIMB, INCLUDING SHOULDER: Status: ACTIVE | Noted: 2024-08-13

## 2024-08-13 PROBLEM — D22.71 MELANOCYTIC NEVI OF RIGHT LOWER LIMB, INCLUDING HIP: Status: ACTIVE | Noted: 2024-08-13

## 2024-08-13 PROBLEM — D22.5 MELANOCYTIC NEVI OF TRUNK: Status: ACTIVE | Noted: 2024-08-13

## 2024-08-13 PROBLEM — D22.62 MELANOCYTIC NEVI OF LEFT UPPER LIMB, INCLUDING SHOULDER: Status: ACTIVE | Noted: 2024-08-13

## 2024-08-13 PROBLEM — D22.39 MELANOCYTIC NEVI OF OTHER PARTS OF FACE: Status: ACTIVE | Noted: 2024-08-13

## 2024-08-13 PROBLEM — D18.01 HEMANGIOMA OF SKIN AND SUBCUTANEOUS TISSUE: Status: ACTIVE | Noted: 2024-08-13

## 2024-08-13 PROBLEM — D22.72 MELANOCYTIC NEVI OF LEFT LOWER LIMB, INCLUDING HIP: Status: ACTIVE | Noted: 2024-08-13

## 2024-08-13 PROCEDURE — 99213 OFFICE O/P EST LOW 20 MIN: CPT

## 2024-08-13 PROCEDURE — ? MEDICATION COUNSELING

## 2024-08-13 PROCEDURE — ? SUNSCREEN RECOMMENDATIONS

## 2024-08-13 PROCEDURE — ? COUNSELING

## 2024-08-13 PROCEDURE — ? ADDITIONAL NOTES

## 2024-08-13 ASSESSMENT — LOCATION DETAILED DESCRIPTION DERM
LOCATION DETAILED: RIGHT INFERIOR MEDIAL MIDBACK
LOCATION DETAILED: RIGHT SUPERIOR UPPER BACK
LOCATION DETAILED: RIGHT INFERIOR CENTRAL MALAR CHEEK
LOCATION DETAILED: INFERIOR THORACIC SPINE
LOCATION DETAILED: LEFT LATERAL PROXIMAL PRETIBIAL REGION
LOCATION DETAILED: LEFT VENTRAL PROXIMAL FOREARM
LOCATION DETAILED: RIGHT PROXIMAL PRETIBIAL REGION
LOCATION DETAILED: RIGHT CENTRAL MALAR CHEEK
LOCATION DETAILED: RIGHT DISTAL POSTERIOR UPPER ARM
LOCATION DETAILED: LEFT PROXIMAL POSTERIOR UPPER ARM
LOCATION DETAILED: LEFT CENTRAL MALAR CHEEK
LOCATION DETAILED: RIGHT DISTAL POSTERIOR THIGH
LOCATION DETAILED: RIGHT VENTRAL PROXIMAL FOREARM
LOCATION DETAILED: RIGHT INFERIOR MEDIAL UPPER BACK
LOCATION DETAILED: RIGHT INFERIOR FOREHEAD
LOCATION DETAILED: LEFT DISTAL POSTERIOR THIGH

## 2024-08-13 ASSESSMENT — LOCATION SIMPLE DESCRIPTION DERM
LOCATION SIMPLE: LEFT FOREARM
LOCATION SIMPLE: LEFT POSTERIOR THIGH
LOCATION SIMPLE: LEFT CHEEK
LOCATION SIMPLE: RIGHT FOREARM
LOCATION SIMPLE: UPPER BACK
LOCATION SIMPLE: RIGHT UPPER BACK
LOCATION SIMPLE: RIGHT CHEEK
LOCATION SIMPLE: RIGHT POSTERIOR THIGH
LOCATION SIMPLE: RIGHT FOREHEAD
LOCATION SIMPLE: RIGHT POSTERIOR UPPER ARM
LOCATION SIMPLE: LEFT PRETIBIAL REGION
LOCATION SIMPLE: RIGHT LOWER BACK
LOCATION SIMPLE: RIGHT PRETIBIAL REGION
LOCATION SIMPLE: LEFT POSTERIOR UPPER ARM

## 2024-08-13 ASSESSMENT — LOCATION ZONE DERM
LOCATION ZONE: ARM
LOCATION ZONE: TRUNK
LOCATION ZONE: FACE
LOCATION ZONE: LEG

## 2024-08-13 NOTE — PROCEDURE: ADDITIONAL NOTES
Render Risk Assessment In Note?: no
Detail Level: Detailed
Additional Notes: Gave pt samples of Amlactin to use on heels.

## 2024-08-13 NOTE — PROCEDURE: MEDICATION COUNSELING
Erivedge Counseling- I discussed with the patient the risks of Erivedge including but not limited to nausea, vomiting, diarrhea, constipation, weight loss, changes in the sense of taste, decreased appetite, muscle spasms, and hair loss.  The patient verbalized understanding of the proper use and possible adverse effects of Erivedge.  All of the patient's questions and concerns were addressed.
Dupixent Counseling: I discussed with the patient the risks of dupilumab including but not limited to eye infection and irritation, cold sores, injection site reactions, worsening of asthma, allergic reactions and increased risk of parasitic infection.  Live vaccines should be avoided while taking dupilumab. Dupilumab will also interact with certain medications such as warfarin and cyclosporine. The patient understands that monitoring is required and they must alert us or the primary physician if symptoms of infection or other concerning signs are noted.
Propranolol Pregnancy And Lactation Text: This medication is Pregnancy Category C and it isn't known if it is safe during pregnancy. It is excreted in breast milk.
Picato Counseling:  I discussed with the patient the risks of Picato including but not limited to erythema, scaling, itching, weeping, crusting, and pain.
Finasteride Male Counseling: Finasteride Counseling:  I discussed with the patient the risks of use of finasteride including but not limited to decreased libido, decreased ejaculate volume, gynecomastia, and depression. Women should not handle medication.  All of the patient's questions and concerns were addressed.
Isotretinoin Pregnancy And Lactation Text: This medication is Pregnancy Category X and is considered extremely dangerous during pregnancy. It is unknown if it is excreted in breast milk.
Arava Pregnancy And Lactation Text: This medication is Pregnancy Category X and is absolutely contraindicated during pregnancy. It is unknown if it is excreted in breast milk.
Opioid Pregnancy And Lactation Text: These medications can lead to premature delivery and should be avoided during pregnancy. These medications are also present in breast milk in small amounts.
Skyrizi Pregnancy And Lactation Text: The risk during pregnancy and breastfeeding is uncertain with this medication.
Sotyktu Counseling:  I discussed the most common side effects of Sotyktu including: common cold, sore throat, sinus infections, cold sores, canker sores, folliculitis, and acne.? I also discussed more serious side effects of Sotyktu including but not limited to: serious allergic reactions; increased risk for infections such as TB; cancers such as lymphomas; rhabdomyolysis and elevated CPK; and elevated triglycerides and liver enzymes.?
Cellcept Counseling:  I discussed with the patient the risks of mycophenolate mofetil including but not limited to infection/immunosuppression, GI upset, hypokalemia, hypercholesterolemia, bone marrow suppression, lymphoproliferative disorders, malignancy, GI ulceration/bleed/perforation, colitis, interstitial lung disease, kidney failure, progressive multifocal leukoencephalopathy, and birth defects.  The patient understands that monitoring is required including a baseline creatinine and regular CBC testing. In addition, patient must alert us immediately if symptoms of infection or other concerning signs are noted.
Topical Ketoconazole Pregnancy And Lactation Text: This medication is Pregnancy Category B and is considered safe during pregnancy. It is unknown if it is excreted in breast milk.
Benzoyl Peroxide Pregnancy And Lactation Text: This medication is Pregnancy Category C. It is unknown if benzoyl peroxide is excreted in breast milk.
Fluconazole Pregnancy And Lactation Text: This medication is Pregnancy Category C and it isn't know if it is safe during pregnancy. It is also excreted in breast milk.
Glycopyrrolate Counseling:  I discussed with the patient the risks of glycopyrrolate including but not limited to skin rash, drowsiness, dry mouth, difficulty urinating, and blurred vision.
Prednisone Pregnancy And Lactation Text: This medication is Pregnancy Category C and it isn't know if it is safe during pregnancy. This medication is excreted in breast milk.
Winlevi Counseling:  I discussed with the patient the risks of topical clascoterone including but not limited to erythema, scaling, itching, and stinging. Patient voiced their understanding.
Ivermectin Pregnancy And Lactation Text: This medication is Pregnancy Category C and it isn't known if it is safe during pregnancy. It is also excreted in breast milk.
5-Fu Pregnancy And Lactation Text: This medication is Pregnancy Category X and contraindicated in pregnancy and in women who may become pregnant. It is unknown if this medication is excreted in breast milk.
Azithromycin Counseling:  I discussed with the patient the risks of azithromycin including but not limited to GI upset, allergic reaction, drug rash, diarrhea, and yeast infections.
Cimetidine Counseling:  I discussed with the patient the risks of Cimetidine including but not limited to gynecomastia, headache, diarrhea, nausea, drowsiness, arrhythmias, pancreatitis, skin rashes, psychosis, bone marrow suppression and kidney toxicity.
Erythromycin Counseling:  I discussed with the patient the risks of erythromycin including but not limited to GI upset, allergic reaction, drug rash, diarrhea, increase in liver enzymes, and yeast infections.
Glycopyrrolate Pregnancy And Lactation Text: This medication is Pregnancy Category B and is considered safe during pregnancy. It is unknown if it is excreted breast milk.
Infliximab Counseling:  I discussed with the patient the risks of infliximab including but not limited to myelosuppression, immunosuppression, autoimmune hepatitis, demyelinating diseases, lymphoma, and serious infections.  The patient understands that monitoring is required including a PPD at baseline and must alert us or the primary physician if symptoms of infection or other concerning signs are noted.
Soolantra Counseling: I discussed with the patients the risks of topial Soolantra. This is a medicine which decreases the number of mites and inflammation in the skin. You experience burning, stinging, eye irritation or allergic reactions.  Please call our office if you develop any problems from using this medication.
Griseofulvin Counseling:  I discussed with the patient the risks of griseofulvin including but not limited to photosensitivity, cytopenia, liver damage, nausea/vomiting and severe allergy.  The patient understands that this medication is best absorbed when taken with a fatty meal (e.g., ice cream or french fries).
Drysol Counseling:  I discussed with the patient the risks of drysol/aluminum chloride including but not limited to skin rash, itching, irritation, burning.
Olanzapine Counseling- I discussed with the patient the common side effects of olanzapine including but are not limited to: lack of energy, dry mouth, increased appetite, sleepiness, tremor, constipation, dizziness, changes in behavior, or restlessness.  Explained that teenagers are more likely to experience headaches, abdominal pain, pain in the arms or legs, tiredness, and sleepiness.  Serious side effects include but are not limited: increased risk of death in elderly patients who are confused, have memory loss, or dementia-related psychosis; hyperglycemia; increased cholesterol and triglycerides; and weight gain.
Imiquimod Pregnancy And Lactation Text: This medication is Pregnancy Category C. It is unknown if this medication is excreted in breast milk.
Cellcept Pregnancy And Lactation Text: This medication is Pregnancy Category D and isn't considered safe during pregnancy. It is unknown if this medication is excreted in breast milk.
High Dose Vitamin A Counseling: Side effects reviewed, pt to contact office should one occur.
Spevigo Counseling: I discussed with the patient the risks of Spevigo including but not limited to fatigue, nasuea, vomiting, headache, pruritus, urinary tract infection, an infusion related reactions.  The patient understands that monitoring is required including screening for tuberculosis at baseline and yearly screening thereafter while continuing Spevigo therapy. They should contact us if symptoms of infection or other concerning signs are noted.
Klisyri Counseling:  I discussed with the patient the risks of Klisyri including but not limited to erythema, scaling, itching, weeping, crusting, and pain.
Adbry Counseling: I discussed with the patient the risks of tralokinumab including but not limited to eye infection and irritation, cold sores, injection site reactions, worsening of asthma, allergic reactions and increased risk of parasitic infection.  Live vaccines should be avoided while taking tralokinumab. The patient understands that monitoring is required and they must alert us or the primary physician if symptoms of infection or other concerning signs are noted.
Winlevi Pregnancy And Lactation Text: This medication is considered safe during pregnancy and breastfeeding.
Cimetidine Pregnancy And Lactation Text: This medication is Pregnancy Category B and is considered safe during pregnancy. It is also excreted in breast milk and breast feeding isn't recommended.
SSKI Counseling:  I discussed with the patient the risks of SSKI including but not limited to thyroid abnormalities, metallic taste, GI upset, fever, headache, acne, arthralgias, paraesthesias, lymphadenopathy, easy bleeding, arrhythmias, and allergic reaction.
Sotyktu Pregnancy And Lactation Text: There is insufficient data to evaluate whether or not Sotyktu is safe to use during pregnancy.? ?It is not known if Sotyktu passes into breast milk and whether or not it is safe to use when breastfeeding.??
Topical Metronidazole Counseling: Metronidazole is a topical antibiotic medication. You may experience burning, stinging, redness, or allergic reactions.  Please call our office if you develop any problems from using this medication.
Finasteride Female Counseling: Finasteride Counseling:  I discussed with the patient the risks of use of finasteride including but not limited to decreased libido and sexual dysfunction. Explained the teratogenic nature of the medication and stressed the importance of not getting pregnant during treatment. All of the patient's questions and concerns were addressed.
Azithromycin Pregnancy And Lactation Text: This medication is considered safe during pregnancy and is also secreted in breast milk.
Clofazimine Counseling:  I discussed with the patient the risks of clofazimine including but not limited to skin and eye pigmentation, liver damage, nausea/vomiting, gastrointestinal bleeding and allergy.
Dupixent Pregnancy And Lactation Text: This medication likely crosses the placenta but the risk for the fetus is uncertain. This medication is excreted in breast milk.
Doxepin Counseling:  Patient advised that the medication is sedating and not to drive a car after taking this medication. Patient informed of potential adverse effects including but not limited to dry mouth, urinary retention, and blurry vision.  The patient verbalized understanding of the proper use and possible adverse effects of doxepin.  All of the patient's questions and concerns were addressed.
Griseofulvin Pregnancy And Lactation Text: This medication is Pregnancy Category X and is known to cause serious birth defects. It is unknown if this medication is excreted in breast milk but breast feeding should be avoided.
Drysol Pregnancy And Lactation Text: This medication is considered safe during pregnancy and breast feeding.
Enbrel Counseling:  I discussed with the patient the risks of etanercept including but not limited to myelosuppression, immunosuppression, autoimmune hepatitis, demyelinating diseases, lymphoma, and infections.  The patient understands that monitoring is required including a PPD at baseline and must alert us or the primary physician if symptoms of infection or other concerning signs are noted.
Protopic Counseling: Patient may experience a mild burning sensation during topical application. Protopic is not approved in children less than 2 years of age. There have been case reports of hematologic and skin malignancies in patients using topical calcineurin inhibitors although causality is questionable.
VTAMA Counseling: I discussed with the patient that VTAMA is not for use in the eyes, mouth or mouth. They should call the office if they develop any signs of allergic reactions to VTAMA. The patient verbalized understanding of the proper use and possible adverse effects of VTAMA.  All of the patient's questions and concerns were addressed.
Cibinqo Counseling: I discussed with the patient the risks of Cibinqo therapy including but not limited to common cold, nausea, headache, cold sores, increased blood CPK levels, dizziness, UTIs, fatigue, acne, and vomitting. Live vaccines should be avoided.  This medication has been linked to serious infections; higher rate of mortality; malignancy and lymphoproliferative disorders; major adverse cardiovascular events; thrombosis; thrombocytopenia and lymphopenia; lipid elevations; and retinal detachment.
Infliximab Pregnancy And Lactation Text: This medication is Pregnancy Category B and is considered safe during pregnancy. It is unknown if this medication is excreted in breast milk.
Olanzapine Pregnancy And Lactation Text: This medication is pregnancy category C.   There are no adequate and well controlled trials with olanzapine in pregnant females.  Olanzapine should be used during pregnancy only if the potential benefit justifies the potential risk to the fetus.   In a study in lactating healthy women, olanzapine was excreted in breast milk.  It is recommended that women taking olanzapine should not breast feed.
Soolantra Pregnancy And Lactation Text: This medication is Pregnancy Category C. This medication is considered safe during breast feeding.
Rifampin Counseling: I discussed with the patient the risks of rifampin including but not limited to liver damage, kidney damage, red-orange body fluids, nausea/vomiting and severe allergy.
Hydroxychloroquine Counseling:  I discussed with the patient that a baseline ophthalmologic exam is needed at the start of therapy and every year thereafter while on therapy. A CBC may also be warranted for monitoring.  The side effects of this medication were discussed with the patient, including but not limited to agranulocytosis, aplastic anemia, seizures, rashes, retinopathy, and liver toxicity. Patient instructed to call the office should any adverse effect occur.  The patient verbalized understanding of the proper use and possible adverse effects of Plaquenil.  All the patient's questions and concerns were addressed.
Erythromycin Pregnancy And Lactation Text: This medication is Pregnancy Category B and is considered safe during pregnancy. It is also excreted in breast milk.
Adbry Pregnancy And Lactation Text: It is unknown if this medication will adversely affect pregnancy or breast feeding.
Klisyri Pregnancy And Lactation Text: It is unknown if this medication can harm a developing fetus or if it is excreted in breast milk.
Spevigo Pregnancy And Lactation Text: The risk during pregnancy and breastfeeding is uncertain with this medication. This medication does cross the placenta. It is unknown if this medication is found in breast milk.
Oral Minoxidil Counseling- I discussed with the patient the risks of oral minoxidil including but not limited to shortness of breath, swelling of the feet or ankles, dizziness, lightheadedness, unwanted hair growth and allergic reaction.  The patient verbalized understanding of the proper use and possible adverse effects of oral minoxidil.  All of the patient's questions and concerns were addressed.
Topical Metronidazole Pregnancy And Lactation Text: This medication is Pregnancy Category B and considered safe during pregnancy.  It is also considered safe to use while breastfeeding.
Metronidazole Counseling:  I discussed with the patient the risks of metronidazole including but not limited to seizures, nausea/vomiting, a metallic taste in the mouth, nausea/vomiting and severe allergy.
Cibinqo Pregnancy And Lactation Text: It is unknown if this medication will adversely affect pregnancy or breast feeding.  You should not take this medication if you are currently pregnant or planning a pregnancy or while breastfeeding.
Topical Retinoid counseling:  Patient advised to apply a pea-sized amount only at bedtime and wait 30 minutes after washing their face before applying.  If too drying, patient may add a non-comedogenic moisturizer. The patient verbalized understanding of the proper use and possible adverse effects of retinoids.  All of the patient's questions and concerns were addressed.
Clofazimine Pregnancy And Lactation Text: This medication is Pregnancy Category C and isn't considered safe during pregnancy. It is excreted in breast milk.
Libtayo Counseling- I discussed with the patient the risks of Libtayo including but not limited to nausea, vomiting, diarrhea, and bone or muscle pain.  The patient verbalized understanding of the proper use and possible adverse effects of Libtayo.  All of the patient's questions and concerns were addressed.
High Dose Vitamin A Pregnancy And Lactation Text: High dose vitamin A therapy is contraindicated during pregnancy and breast feeding.
Sski Pregnancy And Lactation Text: This medication is Pregnancy Category D and isn't considered safe during pregnancy. It is excreted in breast milk.
Bactrim Counseling:  I discussed with the patient the risks of sulfa antibiotics including but not limited to GI upset, allergic reaction, drug rash, diarrhea, dizziness, photosensitivity, and yeast infections.  Rarely, more serious reactions can occur including but not limited to aplastic anemia, agranulocytosis, methemoglobinemia, blood dyscrasias, liver or kidney failure, lung infiltrates or desquamative/blistering drug rashes.
Xeljanz Counseling: I discussed with the patient the risks of Xeljanz therapy including increased risk of infection, liver issues, headache, diarrhea, or cold symptoms. Live vaccines should be avoided. They were instructed to call if they have any problems.
Finasteride Pregnancy And Lactation Text: This medication is absolutely contraindicated during pregnancy. It is unknown if it is excreted in breast milk.
Cyclophosphamide Counseling:  I discussed with the patient the risks of cyclophosphamide including but not limited to hair loss, hormonal abnormalities, decreased fertility, abdominal pain, diarrhea, nausea and vomiting, bone marrow suppression and infection. The patient understands that monitoring is required while taking this medication.
Bactrim Pregnancy And Lactation Text: This medication is Pregnancy Category D and is known to cause fetal risk.  It is also excreted in breast milk.
Taltz Counseling: I discussed with the patient the risks of ixekizumab including but not limited to immunosuppression, serious infections, worsening of inflammatory bowel disease and drug reactions.  The patient understands that monitoring is required including a PPD at baseline and must alert us or the primary physician if symptoms of infection or other concerning signs are noted.
Xelcarringtonz Pregnancy And Lactation Text: This medication is Pregnancy Category D and is not considered safe during pregnancy.  The risk during breast feeding is also uncertain.
Protopic Pregnancy And Lactation Text: This medication is Pregnancy Category C. It is unknown if this medication is excreted in breast milk when applied topically.
Thalidomide Counseling: I discussed with the patient the risks of thalidomide including but not limited to birth defects, anxiety, weakness, chest pain, dizziness, cough and severe allergy.
Libtayo Pregnancy And Lactation Text: This medication is contraindicated in pregnancy and when breast feeding.
Carac Counseling:  I discussed with the patient the risks of Carac including but not limited to erythema, scaling, itching, weeping, crusting, and pain.
Birth Control Pills Counseling: Birth Control Pill Counseling: I discussed with the patient the potential side effects of OCPs including but not limited to increased risk of stroke, heart attack, thrombophlebitis, deep venous thrombosis, hepatic adenomas, breast changes, GI upset, headaches, and depression.  The patient verbalized understanding of the proper use and possible adverse effects of OCPs. All of the patient's questions and concerns were addressed.
Itraconazole Counseling:  I discussed with the patient the risks of itraconazole including but not limited to liver damage, nausea/vomiting, neuropathy, and severe allergy.  The patient understands that this medication is best absorbed when taken with acidic beverages such as non-diet cola or ginger ale.  The patient understands that monitoring is required including baseline LFTs and repeat LFTs at intervals.  The patient understands that they are to contact us or the primary physician if concerning signs are noted.
Rifampin Pregnancy And Lactation Text: This medication is Pregnancy Category C and it isn't know if it is safe during pregnancy. It is also excreted in breast milk and should not be used if you are breast feeding.
Vtama Pregnancy And Lactation Text: It is unknown if this medication can cause problems during pregnancy and breastfeeding.
Doxepin Pregnancy And Lactation Text: This medication is Pregnancy Category C and it isn't known if it is safe during pregnancy. It is also excreted in breast milk and breast feeding isn't recommended.
Elidel Counseling: Patient may experience a mild burning sensation during topical application. Elidel is not approved in children less than 2 years of age. There have been case reports of hematologic and skin malignancies in patients using topical calcineurin inhibitors although causality is questionable.
Rituxan Counseling:  I discussed with the patient the risks of Rituxan infusions. Side effects can include infusion reactions, severe drug rashes including mucocutaneous reactions, reactivation of latent hepatitis and other infections and rarely progressive multifocal leukoencephalopathy.  All of the patient's questions and concerns were addressed.
Hydroxychloroquine Pregnancy And Lactation Text: This medication has been shown to cause fetal harm but it isn't assigned a Pregnancy Risk Category. There are small amounts excreted in breast milk.
Sarecycline Counseling: Patient advised regarding possible photosensitivity and discoloration of the teeth, skin, lips, tongue and gums.  Patient instructed to avoid sunlight, if possible.  When exposed to sunlight, patients should wear protective clothing, sunglasses, and sunscreen.  The patient was instructed to call the office immediately if the following severe adverse effects occur:  hearing changes, easy bruising/bleeding, severe headache, or vision changes.  The patient verbalized understanding of the proper use and possible adverse effects of sarecycline.  All of the patient's questions and concerns were addressed.
Metronidazole Pregnancy And Lactation Text: This medication is Pregnancy Category B and considered safe during pregnancy.  It is also excreted in breast milk.
Hydroxyzine Counseling: Patient advised that the medication is sedating and not to drive a car after taking this medication.  Patient informed of potential adverse effects including but not limited to dry mouth, urinary retention, and blurry vision.  The patient verbalized understanding of the proper use and possible adverse effects of hydroxyzine.  All of the patient's questions and concerns were addressed.
Rituxan Pregnancy And Lactation Text: This medication is Pregnancy Category C and it isn't know if it is safe during pregnancy. It is unknown if this medication is excreted in breast milk but similar antibodies are known to be excreted.
Minoxidil Counseling: Minoxidil is a topical medication which can increase blood flow where it is applied. It is uncertain how this medication increases hair growth. Side effects are uncommon and include stinging and allergic reactions.
Colchicine Counseling:  Patient counseled regarding adverse effects including but not limited to stomach upset (nausea, vomiting, stomach pain, or diarrhea).  Patient instructed to limit alcohol consumption while taking this medication.  Colchicine may reduce blood counts especially with prolonged use.  The patient understands that monitoring of kidney function and blood counts may be required, especially at baseline. The patient verbalized understanding of the proper use and possible adverse effects of colchicine.  All of the patient's questions and concerns were addressed.
Topical Steroids Counseling: I discussed with the patient that prolonged use of topical steroids can result in the increased appearance of superficial blood vessels (telangiectasias), lightening (hypopigmentation) and thinning of the skin (atrophy).  Patient understands to avoid using high potency steroids in skin folds, the groin or the face.  The patient verbalized understanding of the proper use and possible adverse effects of topical steroids.  All of the patient's questions and concerns were addressed.
Oral Minoxidil Pregnancy And Lactation Text: This medication should only be used when clearly needed if you are pregnant, attempting to become pregnant or breast feeding.
Cyclophosphamide Pregnancy And Lactation Text: This medication is Pregnancy Category D and it isn't considered safe during pregnancy. This medication is excreted in breast milk.
Litfulo Counseling: I discussed with the patient the risks of Litfulo therapy including but not limited to upper respiratory tract infections, shingles, cold sores, and nausea. Live vaccines should be avoided.  This medication has been linked to serious infections; higher rate of mortality; malignancy and lymphoproliferative disorders; major adverse cardiovascular events; thrombosis; gastrointestinal perforations; neutropenia; lymphopenia; anemia; liver enzyme elevations; and lipid elevations.
Stelara Counseling:  I discussed with the patient the risks of ustekinumab including but not limited to immunosuppression, malignancy, posterior leukoencephalopathy syndrome, and serious infections.  The patient understands that monitoring is required including a PPD at baseline and must alert us or the primary physician if symptoms of infection or other concerning signs are noted.
Bimzelx Counseling:  I discussed with the patient the risks of Bimzelx including but not limited to depression, immunosuppression, allergic reactions and infections.  The patient understands that monitoring is required including a PPD at baseline and must alert us or the primary physician if symptoms of infection or other concerning signs are noted.
Humira Counseling:  I discussed with the patient the risks of adalimumab including but not limited to myelosuppression, immunosuppression, autoimmune hepatitis, demyelinating diseases, lymphoma, and serious infections.  The patient understands that monitoring is required including a PPD at baseline and must alert us or the primary physician if symptoms of infection or other concerning signs are noted.
Aklief counseling:  Patient advised to apply a pea-sized amount only at bedtime and wait 30 minutes after washing their face before applying.  If too drying, patient may add a non-comedogenic moisturizer.  The most commonly reported side effects including irritation, redness, scaling, dryness, stinging, burning, itching, and increased risk of sunburn.  The patient verbalized understanding of the proper use and possible adverse effects of retinoids.  All of the patient's questions and concerns were addressed.
Qbrexza Counseling:  I discussed with the patient the risks of Qbrexza including but not limited to headache, mydriasis, blurred vision, dry eyes, nasal dryness, dry mouth, dry throat, dry skin, urinary hesitation, and constipation.  Local skin reactions including erythema, burning, stinging, and itching can also occur.
Minoxidil Pregnancy And Lactation Text: This medication has not been assigned a Pregnancy Risk Category but animal studies failed to show danger with the topical medication. It is unknown if the medication is excreted in breast milk.
Topical Steroids Applications Pregnancy And Lactation Text: Most topical steroids are considered safe to use during pregnancy and lactation.  Any topical steroid applied to the breast or nipple should be washed off before breastfeeding.
Cyclosporine Counseling:  I discussed with the patient the risks of cyclosporine including but not limited to hypertension, gingival hyperplasia,myelosuppression, immunosuppression, liver damage, kidney damage, neurotoxicity, lymphoma, and serious infections. The patient understands that monitoring is required including baseline blood pressure, CBC, CMP, lipid panel and uric acid, and then 1-2 times monthly CMP and blood pressure.
Low Dose Naltrexone Counseling- I discussed with the patient the potential risks and side effects of low dose naltrexone including but not limited to: more vivid dreams, headaches, nausea, vomiting, abdominal pain, fatigue, dizziness, and anxiety.
Zoryve Counseling:  I discussed with the patient that Zoryve is not for use in the eyes, mouth or vagina. The most commonly reported side effects include diarrhea, headache, insomnia, application site pain, upper respiratory tract infections, and urinary tract infections.  All of the patient's questions and concerns were addressed.
Odomzo Counseling- I discussed with the patient the risks of Odomzo including but not limited to nausea, vomiting, diarrhea, constipation, weight loss, changes in the sense of taste, decreased appetite, muscle spasms, and hair loss.  The patient verbalized understanding of the proper use and possible adverse effects of Odomzo.  All of the patient's questions and concerns were addressed.
Birth Control Pills Pregnancy And Lactation Text: This medication should be avoided if pregnant and for the first 30 days post-partum.
Cephalexin Counseling: I counseled the patient regarding use of cephalexin as an antibiotic for prophylactic and/or therapeutic purposes. Cephalexin (commonly prescribed under brand name Keflex) is a cephalosporin antibiotic which is active against numerous classes of bacteria, including most skin bacteria. Side effects may include nausea, diarrhea, gastrointestinal upset, rash, hives, yeast infections, and in rare cases, hepatitis, kidney disease, seizures, fever, confusion, neurologic symptoms, and others. Patients with severe allergies to penicillin medications are cautioned that there is about a 10% incidence of cross-reactivity with cephalosporins. When possible, patients with penicillin allergies should use alternatives to cephalosporins for antibiotic therapy.
Hydroxyzine Pregnancy And Lactation Text: This medication is not safe during pregnancy and should not be taken. It is also excreted in breast milk and breast feeding isn't recommended.
Ketoconazole Counseling:   Patient counseled regarding improving absorption with orange juice.  Adverse effects include but are not limited to breast enlargement, headache, diarrhea, nausea, upset stomach, liver function test abnormalities, taste disturbance, and stomach pain.  There is a rare possibility of liver failure that can occur when taking ketoconazole. The patient understands that monitoring of LFTs may be required, especially at baseline. The patient verbalized understanding of the proper use and possible adverse effects of ketoconazole.  All of the patient's questions and concerns were addressed.
Siliq Counseling:  I discussed with the patient the risks of Siliq including but not limited to new or worsening depression, suicidal thoughts and behavior, immunosuppression, malignancy, posterior leukoencephalopathy syndrome, and serious infections.  The patient understands that monitoring is required including a PPD at baseline and must alert us or the primary physician if symptoms of infection or other concerning signs are noted. There is also a special program designed to monitor depression which is required with Siliq.
Low Dose Naltrexone Pregnancy And Lactation Text: Naltrexone is pregnancy category C.  There have been no adequate and well-controlled studies in pregnant women.  It should be used in pregnancy only if the potential benefit justifies the potential risk to the fetus.   Limited data indicates that naltrexone is minimally excreted into breastmilk.
Eucrisa Counseling: Patient may experience a mild burning sensation during topical application. Eucrisa is not approved in children less than 2 years of age.
Tazorac Counseling:  Patient advised that medication is irritating and drying.  Patient may need to apply sparingly and wash off after an hour before eventually leaving it on overnight.  The patient verbalized understanding of the proper use and possible adverse effects of tazorac.  All of the patient's questions and concerns were addressed.
Tremfya Counseling: I discussed with the patient the risks of guselkumab including but not limited to immunosuppression, serious infections, worsening of inflammatory bowel disease and drug reactions.  The patient understands that monitoring is required including a PPD at baseline and must alert us or the primary physician if symptoms of infection or other concerning signs are noted.
Cephalexin Pregnancy And Lactation Text: This medication is Pregnancy Category B and considered safe during pregnancy.  It is also excreted in breast milk but can be used safely for shorter doses.
Tranexamic Acid Counseling:  Patient advised of the small risk of bleeding problems with tranexamic acid. They were also instructed to call if they developed any nausea, vomiting or diarrhea. All of the patient's questions and concerns were addressed.
Litfulo Pregnancy And Lactation Text: Based on animal studies, Lifulo may cause embryo-fetal harm when administered to pregnant women.  The medication should not be used in pregnancy.  Breastfeeding is not recommended during treatment.
Acitretin Counseling:  I discussed with the patient the risks of acitretin including but not limited to hair loss, dry lips/skin/eyes, liver damage, hyperlipidemia, depression/suicidal ideation, photosensitivity.  Serious rare side effects can include but are not limited to pancreatitis, pseudotumor cerebri, bony changes, clot formation/stroke/heart attack.  Patient understands that alcohol is contraindicated since it can result in liver toxicity and significantly prolong the elimination of the drug by many years.
Otezla Counseling: The side effects of Otezla were discussed with the patient, including but not limited to worsening or new depression, weight loss, diarrhea, nausea, upper respiratory tract infection, and headache. Patient instructed to call the office should any adverse effect occur.  The patient verbalized understanding of the proper use and possible adverse effects of Otezla.  All the patient's questions and concerns were addressed.
Sarecycline Pregnancy And Lactation Text: This medication is Pregnancy Category D and not consider safe during pregnancy. It is also excreted in breast milk.
Bimzelx Pregnancy And Lactation Text: This medication crosses the placenta and the safety is uncertain during pregnancy. It is unknown if this medication is present in breast milk.
Detail Level: Zone
Olumiant Counseling: I discussed with the patient the risks of Olumiant therapy including but not limited to upper respiratory tract infections, shingles, cold sores, and nausea. Live vaccines should be avoided.  This medication has been linked to serious infections; higher rate of mortality; malignancy and lymphoproliferative disorders; major adverse cardiovascular events; thrombosis; gastrointestinal perforations; neutropenia; lymphopenia; anemia; liver enzyme elevations; and lipid elevations.
Otezla Pregnancy And Lactation Text: This medication is Pregnancy Category C and it isn't known if it is safe during pregnancy. It is unknown if it is excreted in breast milk.
Cimzia Counseling:  I discussed with the patient the risks of Cimzia including but not limited to immunosuppression, allergic reactions and infections.  The patient understands that monitoring is required including a PPD at baseline and must alert us or the primary physician if symptoms of infection or other concerning signs are noted.
Mirvaso Counseling: Mirvaso is a topical medication which can decrease superficial blood flow where applied. Side effects are uncommon and include stinging, redness and allergic reactions.
Acitretin Pregnancy And Lactation Text: This medication is Pregnancy Category X and should not be given to women who are pregnant or may become pregnant in the future. This medication is excreted in breast milk.
Spironolactone Counseling: Patient advised regarding risks of diarrhea, abdominal pain, hyperkalemia, birth defects (for female patients), liver toxicity and renal toxicity. The patient may need blood work to monitor liver and kidney function and potassium levels while on therapy. The patient verbalized understanding of the proper use and possible adverse effects of spironolactone.  All of the patient's questions and concerns were addressed.
Aklief Pregnancy And Lactation Text: It is unknown if this medication is safe to use during pregnancy.  It is unknown if this medication is excreted in breast milk.  Breastfeeding women should use the topical cream on the smallest area of the skin for the shortest time needed while breastfeeding.  Do not apply to nipple and areola.
Qbrexza Pregnancy And Lactation Text: There is no available data on Qbrexza use in pregnant women.  There is no available data on Qbrexza use in lactation.
Topical Sulfur Applications Counseling: Topical Sulfur Counseling: Patient counseled that this medication may cause skin irritation or allergic reactions.  In the event of skin irritation, the patient was advised to reduce the amount of the drug applied or use it less frequently.   The patient verbalized understanding of the proper use and possible adverse effects of topical sulfur application.  All of the patient's questions and concerns were addressed.
Calcipotriene Counseling:  I discussed with the patient the risks of calcipotriene including but not limited to erythema, scaling, itching, and irritation.
Dapsone Counseling: I discussed with the patient the risks of dapsone including but not limited to hemolytic anemia, agranulocytosis, rashes, methemoglobinemia, kidney failure, peripheral neuropathy, headaches, GI upset, and liver toxicity.  Patients who start dapsone require monitoring including baseline LFTs and weekly CBCs for the first month, then every month thereafter.  The patient verbalized understanding of the proper use and possible adverse effects of dapsone.  All of the patient's questions and concerns were addressed.
Clindamycin Counseling: I counseled the patient regarding use of clindamycin as an antibiotic for prophylactic and/or therapeutic purposes. Clindamycin is active against numerous classes of bacteria, including skin bacteria. Side effects may include nausea, diarrhea, gastrointestinal upset, rash, hives, yeast infections, and in rare cases, colitis.
Tranexamic Acid Pregnancy And Lactation Text: It is unknown if this medication is safe during pregnancy or breast feeding.
Hyrimoz Counseling:  I discussed with the patient the risks of adalimumab including but not limited to myelosuppression, immunosuppression, autoimmune hepatitis, demyelinating diseases, lymphoma, and serious infections.  The patient understands that monitoring is required including a PPD at baseline and must alert us or the primary physician if symptoms of infection or other concerning signs are noted.
Rhofade Counseling: Rhofade is a topical medication which can decrease superficial blood flow where applied. Side effects are uncommon and include stinging, redness and allergic reactions.
Calcipotriene Pregnancy And Lactation Text: The use of this medication during pregnancy or lactation is not recommended as there is insufficient data.
Ketoconazole Pregnancy And Lactation Text: This medication is Pregnancy Category C and it isn't know if it is safe during pregnancy. It is also excreted in breast milk and breast feeding isn't recommended.
Tazorac Pregnancy And Lactation Text: This medication is not safe during pregnancy. It is unknown if this medication is excreted in breast milk.
Tetracycline Counseling: Patient counseled regarding possible photosensitivity and increased risk for sunburn.  Patient instructed to avoid sunlight, if possible.  When exposed to sunlight, patients should wear protective clothing, sunglasses, and sunscreen.  The patient was instructed to call the office immediately if the following severe adverse effects occur:  hearing changes, easy bruising/bleeding, severe headache, or vision changes.  The patient verbalized understanding of the proper use and possible adverse effects of tetracycline.  All of the patient's questions and concerns were addressed. Patient understands to avoid pregnancy while on therapy due to potential birth defects.
Zyclara Counseling:  I discussed with the patient the risks of imiquimod including but not limited to erythema, scaling, itching, weeping, crusting, and pain.  Patient understands that the inflammatory response to imiquimod is variable from person to person and was educated regarded proper titration schedule.  If flu-like symptoms develop, patient knows to discontinue the medication and contact us.
Niacinamide Counseling: I recommended taking niacin or niacinamide, also know as vitamin B3, twice daily. Recent evidence suggests that taking vitamin B3 (500 mg twice daily) can reduce the risk of actinic keratoses and non-melanoma skin cancers. Side effects of vitamin B3 include flushing and headache.
Cimzia Pregnancy And Lactation Text: This medication crosses the placenta but can be considered safe in certain situations. Cimzia may be excreted in breast milk.
Bexarotene Counseling:  I discussed with the patient the risks of bexarotene including but not limited to hair loss, dry lips/skin/eyes, liver abnormalities, hyperlipidemia, pancreatitis, depression/suicidal ideation, photosensitivity, drug rash/allergic reactions, hypothyroidism, anemia, leukopenia, infection, cataracts, and teratogenicity.  Patient understands that they will need regular blood tests to check lipid profile, liver function tests, white blood cell count, thyroid function tests and pregnancy test if applicable.
Mirvaso Pregnancy And Lactation Text: This medication has not been assigned a Pregnancy Risk Category. It is unknown if the medication is excreted in breast milk.
Albendazole Counseling:  I discussed with the patient the risks of albendazole including but not limited to cytopenia, kidney damage, nausea/vomiting and severe allergy.  The patient understands that this medication is being used in an off-label manner.
Simponi Counseling:  I discussed with the patient the risks of golimumab including but not limited to myelosuppression, immunosuppression, autoimmune hepatitis, demyelinating diseases, lymphoma, and serious infections.  The patient understands that monitoring is required including a PPD at baseline and must alert us or the primary physician if symptoms of infection or other concerning signs are noted.
Topical Clindamycin Counseling: Patient counseled that this medication may cause skin irritation or allergic reactions.  In the event of skin irritation, the patient was advised to reduce the amount of the drug applied or use it less frequently.   The patient verbalized understanding of the proper use and possible adverse effects of clindamycin.  All of the patient's questions and concerns were addressed.
Dapsone Pregnancy And Lactation Text: This medication is Pregnancy Category C and is not considered safe during pregnancy or breast feeding.
Azelaic Acid Counseling: Patient counseled that medicine may cause skin irritation and to avoid applying near the eyes.  In the event of skin irritation, the patient was advised to reduce the amount of the drug applied or use it less frequently.   The patient verbalized understanding of the proper use and possible adverse effects of azelaic acid.  All of the patient's questions and concerns were addressed.
Topical Sulfur Applications Pregnancy And Lactation Text: This medication is Pregnancy Category C and has an unknown safety profile during pregnancy. It is unknown if this topical medication is excreted in breast milk.
Spironolactone Pregnancy And Lactation Text: This medication can cause feminization of the male fetus and should be avoided during pregnancy. The active metabolite is also found in breast milk.
Oxybutynin Counseling:  I discussed with the patient the risks of oxybutynin including but not limited to skin rash, drowsiness, dry mouth, difficulty urinating, and blurred vision.
Methotrexate Counseling:  Patient counseled regarding adverse effects of methotrexate including but not limited to nausea, vomiting, abnormalities in liver function tests. Patients may develop mouth sores, rash, diarrhea, and abnormalities in blood counts. The patient understands that monitoring is required including LFT's and blood counts.  There is a rare possibility of scarring of the liver and lung problems that can occur when taking methotrexate. Persistent nausea, loss of appetite, pale stools, dark urine, cough, and shortness of breath should be reported immediately. Patient advised to discontinue methotrexate treatment at least three months before attempting to become pregnant.  I discussed the need for folate supplements while taking methotrexate.  These supplements can decrease side effects during methotrexate treatment. The patient verbalized understanding of the proper use and possible adverse effects of methotrexate.  All of the patient's questions and concerns were addressed.
Dutasteride Male Counseling: Dustasteride Counseling:  I discussed with the patient the risks of use of dutasteride including but not limited to decreased libido, decreased ejaculate volume, and gynecomastia. Women who can become pregnant should not handle medication.  All of the patient's questions and concerns were addressed.
Include Pregnancy/Lactation Warning?: No
Olumiant Pregnancy And Lactation Text: Based on animal studies, Olumiant may cause embryo-fetal harm when administered to pregnant women.  The medication should not be used in pregnancy.  Breastfeeding is not recommended during treatment.
Gabapentin Counseling: I discussed with the patient the risks of gabapentin including but not limited to dizziness, somnolence, fatigue and ataxia.
Cantharidin Counseling:  I discussed with the patient the risks of Cantharidin including but not limited to pain, redness, burning, itching, and blistering.
Methotrexate Pregnancy And Lactation Text: This medication is Pregnancy Category X and is known to cause fetal harm. This medication is excreted in breast milk.
Wartpeel Counseling:  I discussed with the patient the risks of Wartpeel including but not limited to erythema, scaling, itching, weeping, crusting, and pain.
Terbinafine Counseling: Patient counseling regarding adverse effects of terbinafine including but not limited to headache, diarrhea, rash, upset stomach, liver function test abnormalities, itching, taste/smell disturbance, nausea, abdominal pain, and flatulence.  There is a rare possibility of liver failure that can occur when taking terbinafine.  The patient understands that a baseline LFT and kidney function test may be required. The patient verbalized understanding of the proper use and possible adverse effects of terbinafine.  All of the patient's questions and concerns were addressed.
Niacinamide Pregnancy And Lactation Text: These medications are considered safe during pregnancy.
Xolair Counseling:  Patient informed of potential adverse effects including but not limited to fever, muscle aches, rash and allergic reactions.  The patient verbalized understanding of the proper use and possible adverse effects of Xolair.  All of the patient's questions and concerns were addressed.
Minocycline Counseling: Patient advised regarding possible photosensitivity and discoloration of the teeth, skin, lips, tongue and gums.  Patient instructed to avoid sunlight, if possible.  When exposed to sunlight, patients should wear protective clothing, sunglasses, and sunscreen.  The patient was instructed to call the office immediately if the following severe adverse effects occur:  hearing changes, easy bruising/bleeding, severe headache, or vision changes.  The patient verbalized understanding of the proper use and possible adverse effects of minocycline.  All of the patient's questions and concerns were addressed.
Valtrex Counseling: I discussed with the patient the risks of valacyclovir including but not limited to kidney damage, nausea, vomiting and severe allergy.  The patient understands that if the infection seems to be worsening or is not improving, they are to call.
Hydroquinone Counseling:  Patient advised that medication may result in skin irritation, lightening (hypopigmentation), dryness, and burning.  In the event of skin irritation, the patient was advised to reduce the amount of the drug applied or use it less frequently.  Rarely, spots that are treated with hydroquinone can become darker (pseudoochronosis).  Should this occur, patient instructed to stop medication and call the office. The patient verbalized understanding of the proper use and possible adverse effects of hydroquinone.  All of the patient's questions and concerns were addressed.
Clindamycin Pregnancy And Lactation Text: This medication can be used in pregnancy if certain situations. Clindamycin is also present in breast milk.
Dutasteride Female Counseling: Dutasteride Counseling:  I discussed with the patient the risks of use of dutasteride including but not limited to decreased libido and sexual dysfunction. Explained the teratogenic nature of the medication and stressed the importance of not getting pregnant during treatment. All of the patient's questions and concerns were addressed.
Nsaids Counseling: NSAID Counseling: I discussed with the patient that NSAIDs should be taken with food. Prolonged use of NSAIDs can result in the development of stomach ulcers.  Patient advised to stop taking NSAIDs if abdominal pain occurs.  The patient verbalized understanding of the proper use and possible adverse effects of NSAIDs.  All of the patient's questions and concerns were addressed.
Azathioprine Counseling:  I discussed with the patient the risks of azathioprine including but not limited to myelosuppression, immunosuppression, hepatotoxicity, lymphoma, and infections.  The patient understands that monitoring is required including baseline LFTs, Creatinine, possible TPMP genotyping and weekly CBCs for the first month and then every 2 weeks thereafter.  The patient verbalized understanding of the proper use and possible adverse effects of azathioprine.  All of the patient's questions and concerns were addressed.
Xolair Pregnancy And Lactation Text: This medication is Pregnancy Category B and is considered safe during pregnancy. This medication is excreted in breast milk.
Doxycycline Counseling:  Patient counseled regarding possible photosensitivity and increased risk for sunburn.  Patient instructed to avoid sunlight, if possible.  When exposed to sunlight, patients should wear protective clothing, sunglasses, and sunscreen.  The patient was instructed to call the office immediately if the following severe adverse effects occur:  hearing changes, easy bruising/bleeding, severe headache, or vision changes.  The patient verbalized understanding of the proper use and possible adverse effects of doxycycline.  All of the patient's questions and concerns were addressed.
Cantharidin Pregnancy And Lactation Text: This medication has not been proven safe during pregnancy. It is unknown if this medication is excreted in breast milk.
Bexarotene Pregnancy And Lactation Text: This medication is Pregnancy Category X and should not be given to women who are pregnant or may become pregnant. This medication should not be used if you are breast feeding.
Opzelura Counseling:  I discussed with the patient the risks of Opzelura including but not limited to nasopharngitis, bronchitis, ear infection, eosinophila, hives, diarrhea, folliculitis, tonsillitis, and rhinorrhea.  Taken orally, this medication has been linked to serious infections; higher rate of mortality; malignancy and lymphoproliferative disorders; major adverse cardiovascular events; thrombosis; thrombocytopenia, anemia, and neutropenia; and lipid elevations.
Rinvoq Counseling: I discussed with the patient the risks of Rinvoq therapy including but not limited to upper respiratory tract infections, shingles, cold sores, bronchitis, nausea, cough, fever, acne, and headache. Live vaccines should be avoided.  This medication has been linked to serious infections; higher rate of mortality; malignancy and lymphoproliferative disorders; major adverse cardiovascular events; thrombosis; thrombocytopenia, anemia, and neutropenia; lipid elevations; liver enzyme elevations; and gastrointestinal perforations.
Cosentyx Counseling:  I discussed with the patient the risks of Cosentyx including but not limited to worsening of Crohn's disease, immunosuppression, allergic reactions and infections.  The patient understands that monitoring is required including a PPD at baseline and must alert us or the primary physician if symptoms of infection or other concerning signs are noted.
Prednisone Counseling:  I discussed with the patient the risks of prolonged use of prednisone including but not limited to weight gain, insomnia, osteoporosis, mood changes, diabetes, susceptibility to infection, glaucoma and high blood pressure.  In cases where prednisone use is prolonged, patients should be monitored with blood pressure checks, serum glucose levels and an eye exam.  Additionally, the patient may need to be placed on GI prophylaxis, PCP prophylaxis, and calcium and vitamin D supplementation and/or a bisphosphonate.  The patient verbalized understanding of the proper use and the possible adverse effects of prednisone.  All of the patient's questions and concerns were addressed.
5-Fu Counseling: 5-Fluorouracil Counseling:  I discussed with the patient the risks of 5-fluorouracil including but not limited to erythema, scaling, itching, weeping, crusting, and pain.
Rinvoq Pregnancy And Lactation Text: Based on animal studies, Rinvoq may cause embryo-fetal harm when administered to pregnant women.  The medication should not be used in pregnancy.  Breastfeeding is not recommended during treatment and for 6 days after the last dose.
Benzoyl Peroxide Counseling: Patient counseled that medicine may cause skin irritation and bleach clothing.  In the event of skin irritation, the patient was advised to reduce the amount of the drug applied or use it less frequently.   The patient verbalized understanding of the proper use and possible adverse effects of benzoyl peroxide.  All of the patient's questions and concerns were addressed.
Opioid Counseling: I discussed with the patient the potential side effects of opioids including but not limited to addiction, altered mental status, and depression. I stressed avoiding alcohol, benzodiazepines, muscle relaxants and sleep aids unless specifically okayed by a physician. The patient verbalized understanding of the proper use and possible adverse effects of opioids. All of the patient's questions and concerns were addressed. They were instructed to flush the remaining pills down the toilet if they did not need them for pain.
Fluconazole Counseling:  Patient counseled regarding adverse effects of fluconazole including but not limited to headache, diarrhea, nausea, upset stomach, liver function test abnormalities, taste disturbance, and stomach pain.  There is a rare possibility of liver failure that can occur when taking fluconazole.  The patient understands that monitoring of LFTs and kidney function test may be required, especially at baseline. The patient verbalized understanding of the proper use and possible adverse effects of fluconazole.  All of the patient's questions and concerns were addressed.
Propranolol Counseling:  I discussed with the patient the risks of propranolol including but not limited to low heart rate, low blood pressure, low blood sugar, restlessness and increased cold sensitivity. They should call the office if they experience any of these side effects.
Opzelura Pregnancy And Lactation Text: There is insufficient data to evaluate drug-associated risk for major birth defects, miscarriage, or other adverse maternal or fetal outcomes.  There is a pregnancy registry that monitors pregnancy outcomes in pregnant persons exposed to the medication during pregnancy.  It is unknown if this medication is excreted in breast milk.  Do not breastfeed during treatment and for about 4 weeks after the last dose.
Ivermectin Counseling:  Patient instructed to take medication on an empty stomach with a full glass of water.  Patient informed of potential adverse effects including but not limited to nausea, diarrhea, dizziness, itching, and swelling of the extremities or lymph nodes.  The patient verbalized understanding of the proper use and possible adverse effects of ivermectin.  All of the patient's questions and concerns were addressed.
Solaraze Counseling:  I discussed with the patient the risks of Solaraze including but not limited to erythema, scaling, itching, weeping, crusting, and pain.
Valtrex Pregnancy And Lactation Text: this medication is Pregnancy Category B and is considered safe during pregnancy. This medication is not directly found in breast milk but it's metabolite acyclovir is present.
Ilumya Counseling: I discussed with the patient the risks of tildrakizumab including but not limited to immunosuppression, malignancy, posterior leukoencephalopathy syndrome, and serious infections.  The patient understands that monitoring is required including a PPD at baseline and must alert us or the primary physician if symptoms of infection or other concerning signs are noted.
Quinolones Counseling:  I discussed with the patient the risks of fluoroquinolones including but not limited to GI upset, allergic reaction, drug rash, diarrhea, dizziness, photosensitivity, yeast infections, liver function test abnormalities, tendonitis/tendon rupture.
Imiquimod Counseling:  I discussed with the patient the risks of imiquimod including but not limited to erythema, scaling, itching, weeping, crusting, and pain.  Patient understands that the inflammatory response to imiquimod is variable from person to person and was educated regarded proper titration schedule.  If flu-like symptoms develop, patient knows to discontinue the medication and contact us.
Doxycycline Pregnancy And Lactation Text: This medication is Pregnancy Category D and not consider safe during pregnancy. It is also excreted in breast milk but is considered safe for shorter treatment courses.
Solaraze Pregnancy And Lactation Text: This medication is Pregnancy Category B and is considered safe. There is some data to suggest avoiding during the third trimester. It is unknown if this medication is excreted in breast milk.
Skyrizi Counseling: I discussed with the patient the risks of risankizumab-rzaa including but not limited to immunosuppression, and serious infections.  The patient understands that monitoring is required including a PPD at baseline and must alert us or the primary physician if symptoms of infection or other concerning signs are noted.
Arava Counseling:  Patient counseled regarding adverse effects of Arava including but not limited to nausea, vomiting, abnormalities in liver function tests. Patients may develop mouth sores, rash, diarrhea, and abnormalities in blood counts. The patient understands that monitoring is required including LFTs and blood counts.  There is a rare possibility of scarring of the liver and lung problems that can occur when taking methotrexate. Persistent nausea, loss of appetite, pale stools, dark urine, cough, and shortness of breath should be reported immediately. Patient advised to discontinue Arava treatment and consult with a physician prior to attempting conception. The patient will have to undergo a treatment to eliminate Arava from the body prior to conception.
Isotretinoin Counseling: Patient should get monthly blood tests, not donate blood, not drive at night if vision affected, not share medication, and not undergo elective surgery for 6 months after tx completed. Side effects reviewed, pt to contact office should one occur.
Topical Ketoconazole Counseling: Patient counseled that this medication may cause skin irritation or allergic reactions.  In the event of skin irritation, the patient was advised to reduce the amount of the drug applied or use it less frequently.   The patient verbalized understanding of the proper use and possible adverse effects of ketoconazole.  All of the patient's questions and concerns were addressed.
Dutasteride Pregnancy And Lactation Text: This medication is absolutely contraindicated in women, especially during pregnancy and breast feeding. Feminization of male fetuses is possible if taking while pregnant.
Nsaids Pregnancy And Lactation Text: These medications are considered safe up to 30 weeks gestation. It is excreted in breast milk.

## 2024-09-24 ENCOUNTER — HOSPITAL ENCOUNTER (OUTPATIENT)
Dept: LAB | Facility: MEDICAL CENTER | Age: 49
End: 2024-09-24
Attending: INTERNAL MEDICINE
Payer: COMMERCIAL

## 2024-09-24 ENCOUNTER — TELEPHONE (OUTPATIENT)
Dept: MEDICAL GROUP | Facility: MEDICAL CENTER | Age: 49
End: 2024-09-24
Payer: COMMERCIAL

## 2024-09-24 DIAGNOSIS — Z12.5 PROSTATE CANCER SCREENING: ICD-10-CM

## 2024-09-24 DIAGNOSIS — E55.9 VITAMIN D DEFICIENCY: ICD-10-CM

## 2024-09-24 DIAGNOSIS — Z11.59 NEED FOR HEPATITIS C SCREENING TEST: ICD-10-CM

## 2024-09-24 DIAGNOSIS — Z00.00 PREVENTATIVE HEALTH CARE: Chronic | ICD-10-CM

## 2024-09-24 LAB
25(OH)D3 SERPL-MCNC: 24 NG/ML (ref 30–100)
ALBUMIN SERPL BCP-MCNC: 4 G/DL (ref 3.2–4.9)
ALBUMIN/GLOB SERPL: 1.2 G/DL
ALP SERPL-CCNC: 81 U/L (ref 30–99)
ALT SERPL-CCNC: 18 U/L (ref 2–50)
ANION GAP SERPL CALC-SCNC: 13 MMOL/L (ref 7–16)
APPEARANCE UR: CLEAR
AST SERPL-CCNC: 17 U/L (ref 12–45)
BASOPHILS # BLD AUTO: 1.3 % (ref 0–1.8)
BASOPHILS # BLD: 0.08 K/UL (ref 0–0.12)
BILIRUB SERPL-MCNC: 0.4 MG/DL (ref 0.1–1.5)
BILIRUB UR QL STRIP.AUTO: NEGATIVE
BUN SERPL-MCNC: 14 MG/DL (ref 8–22)
CALCIUM ALBUM COR SERPL-MCNC: 9.2 MG/DL (ref 8.5–10.5)
CALCIUM SERPL-MCNC: 9.2 MG/DL (ref 8.4–10.2)
CHLORIDE SERPL-SCNC: 101 MMOL/L (ref 96–112)
CHOLEST SERPL-MCNC: 247 MG/DL (ref 100–199)
CO2 SERPL-SCNC: 22 MMOL/L (ref 20–33)
COLOR UR: YELLOW
CREAT SERPL-MCNC: 0.95 MG/DL (ref 0.5–1.4)
EOSINOPHIL # BLD AUTO: 0.3 K/UL (ref 0–0.51)
EOSINOPHIL NFR BLD: 4.8 % (ref 0–6.9)
ERYTHROCYTE [DISTWIDTH] IN BLOOD BY AUTOMATED COUNT: 39.6 FL (ref 35.9–50)
FASTING STATUS PATIENT QL REPORTED: NORMAL
GFR SERPLBLD CREATININE-BSD FMLA CKD-EPI: 98 ML/MIN/1.73 M 2
GLOBULIN SER CALC-MCNC: 3.4 G/DL (ref 1.9–3.5)
GLUCOSE SERPL-MCNC: 96 MG/DL (ref 65–99)
GLUCOSE UR STRIP.AUTO-MCNC: NEGATIVE MG/DL
HCT VFR BLD AUTO: 47.3 % (ref 42–52)
HDLC SERPL-MCNC: 49 MG/DL
HGB BLD-MCNC: 15.9 G/DL (ref 14–18)
IMM GRANULOCYTES # BLD AUTO: 0.01 K/UL (ref 0–0.11)
IMM GRANULOCYTES NFR BLD AUTO: 0.2 % (ref 0–0.9)
KETONES UR STRIP.AUTO-MCNC: NEGATIVE MG/DL
LDLC SERPL CALC-MCNC: 156 MG/DL
LEUKOCYTE ESTERASE UR QL STRIP.AUTO: NEGATIVE
LYMPHOCYTES # BLD AUTO: 2.12 K/UL (ref 1–4.8)
LYMPHOCYTES NFR BLD: 34 % (ref 22–41)
MCH RBC QN AUTO: 29.6 PG (ref 27–33)
MCHC RBC AUTO-ENTMCNC: 33.6 G/DL (ref 32.3–36.5)
MCV RBC AUTO: 87.9 FL (ref 81.4–97.8)
MICRO URNS: NORMAL
MONOCYTES # BLD AUTO: 0.37 K/UL (ref 0–0.85)
MONOCYTES NFR BLD AUTO: 5.9 % (ref 0–13.4)
NEUTROPHILS # BLD AUTO: 3.36 K/UL (ref 1.82–7.42)
NEUTROPHILS NFR BLD: 53.8 % (ref 44–72)
NITRITE UR QL STRIP.AUTO: NEGATIVE
NRBC # BLD AUTO: 0 K/UL
NRBC BLD-RTO: 0 /100 WBC (ref 0–0.2)
PH UR STRIP.AUTO: 6 [PH] (ref 5–8)
PLATELET # BLD AUTO: 244 K/UL (ref 164–446)
PMV BLD AUTO: 9 FL (ref 9–12.9)
POTASSIUM SERPL-SCNC: 4.1 MMOL/L (ref 3.6–5.5)
PROT SERPL-MCNC: 7.4 G/DL (ref 6–8.2)
PROT UR QL STRIP: NEGATIVE MG/DL
PSA SERPL-MCNC: 1.02 NG/ML (ref 0–4)
RBC # BLD AUTO: 5.38 M/UL (ref 4.7–6.1)
RBC UR QL AUTO: NEGATIVE
SODIUM SERPL-SCNC: 136 MMOL/L (ref 135–145)
SP GR UR STRIP.AUTO: <=1.005
TRIGL SERPL-MCNC: 212 MG/DL (ref 0–149)
TSH SERPL DL<=0.005 MIU/L-ACNC: 1.22 UIU/ML (ref 0.38–5.33)
WBC # BLD AUTO: 6.2 K/UL (ref 4.8–10.8)

## 2024-09-24 PROCEDURE — 83036 HEMOGLOBIN GLYCOSYLATED A1C: CPT

## 2024-09-24 PROCEDURE — 80053 COMPREHEN METABOLIC PANEL: CPT

## 2024-09-24 PROCEDURE — 36415 COLL VENOUS BLD VENIPUNCTURE: CPT

## 2024-09-24 PROCEDURE — 81003 URINALYSIS AUTO W/O SCOPE: CPT

## 2024-09-24 PROCEDURE — 84443 ASSAY THYROID STIM HORMONE: CPT

## 2024-09-24 PROCEDURE — 80061 LIPID PANEL: CPT

## 2024-09-24 PROCEDURE — 82306 VITAMIN D 25 HYDROXY: CPT

## 2024-09-24 PROCEDURE — 86803 HEPATITIS C AB TEST: CPT

## 2024-09-24 PROCEDURE — 84153 ASSAY OF PSA TOTAL: CPT

## 2024-09-24 PROCEDURE — 85025 COMPLETE CBC W/AUTO DIFF WBC: CPT

## 2024-09-25 LAB
EST. AVERAGE GLUCOSE BLD GHB EST-MCNC: 117 MG/DL
HBA1C MFR BLD: 5.7 % (ref 4–5.6)
HCV AB SER QL: NORMAL

## 2024-09-27 ENCOUNTER — APPOINTMENT (OUTPATIENT)
Dept: OCCUPATIONAL MEDICINE | Facility: CLINIC | Age: 49
End: 2024-09-27

## 2024-09-27 DIAGNOSIS — Z23 NEED FOR VACCINATION: Primary | ICD-10-CM

## 2024-09-27 PROCEDURE — 90656 IIV3 VACC NO PRSV 0.5 ML IM: CPT | Performed by: PREVENTIVE MEDICINE

## 2024-12-30 ENCOUNTER — APPOINTMENT (OUTPATIENT)
Dept: MEDICAL GROUP | Facility: MEDICAL CENTER | Age: 49
End: 2024-12-30
Payer: COMMERCIAL

## 2024-12-30 VITALS
DIASTOLIC BLOOD PRESSURE: 76 MMHG | SYSTOLIC BLOOD PRESSURE: 114 MMHG | TEMPERATURE: 97 F | HEIGHT: 73 IN | BODY MASS INDEX: 29.9 KG/M2 | WEIGHT: 225.6 LBS | OXYGEN SATURATION: 96 % | HEART RATE: 69 BPM

## 2024-12-30 DIAGNOSIS — K21.9 GASTROESOPHAGEAL REFLUX DISEASE, UNSPECIFIED WHETHER ESOPHAGITIS PRESENT: ICD-10-CM

## 2024-12-30 DIAGNOSIS — M54.2 NECK PAIN: ICD-10-CM

## 2024-12-30 DIAGNOSIS — M54.9 BACK PAIN, UNSPECIFIED BACK LOCATION, UNSPECIFIED BACK PAIN LATERALITY, UNSPECIFIED CHRONICITY: ICD-10-CM

## 2024-12-30 DIAGNOSIS — R10.13 DYSPEPSIA: ICD-10-CM

## 2024-12-30 DIAGNOSIS — Z00.00 PREVENTATIVE HEALTH CARE: Chronic | ICD-10-CM

## 2024-12-30 DIAGNOSIS — Z12.11 COLON CANCER SCREENING: ICD-10-CM

## 2024-12-30 ASSESSMENT — PATIENT HEALTH QUESTIONNAIRE - PHQ9: CLINICAL INTERPRETATION OF PHQ2 SCORE: 0

## 2024-12-30 ASSESSMENT — FIBROSIS 4 INDEX: FIB4 SCORE: 0.82

## 2024-12-31 ENCOUNTER — PATIENT MESSAGE (OUTPATIENT)
Dept: MEDICAL GROUP | Facility: MEDICAL CENTER | Age: 49
End: 2024-12-31
Payer: COMMERCIAL

## 2024-12-31 NOTE — PROGRESS NOTES
Subjective     Gurmeet Mercer is a 49 y.o. male who presents with Follow-Up (Headaches, FMLA renewal, been having stomach issues)            HPI    Patient here for follow-up FMLA renewal.  Has had more headaches mixed type occurring 3 days out of 7 days, the headaches will start in the middle of the day most commonly in the afternoon, can be brought about by eye fatigue or neck strain, will start back or side of head like an ache, scale 3-4 out of 10, rarely up to 8 - 9 out of 10 can be viselike, not sharp, no visual auras but regular with blurry vision, no spots in front of eyes, no visual loss, no emesis or nausea, sees optometry with no changes on exam.  Has seen neurology in the past for the headaches and neck pain, has tried Pamelor with no benefit.  Will take Tylenol, or Advil 2 tablets at a time or Naprosyn 1 at a time as needed for the headache the NSAIDs do not cause GI upset.  The headache and his occasional neck pain does not seem to be associated with tooth grinding, no history of TMJ.  Has not been told by his dentist that he grinds his teeth or that he would need a dental guard.  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  Typically when the severe headaches occur he will need to take a day off, this may happen 2-4 times per month lasting the entire work shift.  He does need FMLA completed for this.    Last two weeks has been having more gerd burning sensation in neck area and can be associated with stomach regurgitation, in the past the reflux-like symptoms would get better with rolaids or pepcid, typically symptoms can be related to meals, coffee intake, however the last two weeks appeared to be worse after meals, although improved in terms the last 2-3  days, may notice the symptoms in the evening unless he sits up.  Posture may be associate with reflux symptoms.  Has gained a few pounds over the holidays but no significant change in his diet program.  Has not been exercising regularly.  No associated nausea or vomiting, no diarrhea, no loose stools, no odynophagia.  Medications, allergies, medical history, surgical history, social history, family history  reviewed and updated          Current Outpatient Medications   Medication Sig Dispense Refill    triamcinolone (NASACORT ALLERGY 24HR) 55 MCG/ACT nasal inhaler Administer 2 Sprays into affected nostril(S) every day. 10.8 mL 3    ipratropium (ATROVENT) 0.03 % Solution Administer 2 Sprays into affected nostril(S) every 12 hours. 30 mL 3    acetaminophen (TYLENOL) 500 MG Tab Take 500-1,000 mg by mouth every 6 hours as needed.      predniSONE (DELTASONE) 20 MG Tab Take 2 tablets by mouth once daily for 5 days 10 Tablet 0    Azelaic Acid 15 % Gel Apply to thin film to  facial acne once or twice daily. (Patient not taking: Reported on 12/30/2024) 50 g 3     No current facility-administered medications for this visit.           Allergic rhinitis  8/1/17 start atrovent nasal, has tried astelin and nasonex previously   4/5/22 tried astelin, will try atrovent and allegra  Astelin causes funny taste, zyrtec causes mood changes     Dyslipidemia  9/11 chol 201,trig 179,hdl 37,ldl 128 start fish oil 2 bid  8/12 chol 183,trig 165,hdl 36,ldl 114  9/14/13 chol 208,trig 308,hdl 37,ldl 109  9/4/14 chol 210,trig 329,hdl 37,ldl 107  6/14/16 chol 220,trig 315,hdl 40,ldl 117  9/19/17 chol 210,trig 240,hdl 42,ldl 120  9/18/18 chol 204,trig 369,hdl 40,ldl 90  8/12/19 chol 215,trig 167,hdl 42,ldl 140  4/7/22 chol 232,trig 152,hdl 45,ldl 157  9/24/24 chol 247,trig 212,hdl 49,ldl 156     gerd  11/8/21 takes pepcid as needed  3/14/22 gerd with certain foods and at night, will try pepcid     history headache  8/1/17 tension headache,  occasional blurry vision left eye, referral back to ophthalmology, trial of nortriptyline 10 mg question tension headache, atypical migraine  12/4/17 tried pamelor no benefit, trial of acupuncture, ophthalmology evaluation pending   3/15/18 dr.hale ophthalmology note, scintillating scotomata, likely complicated migraine recommend different of lactic agent, referral to neurology , trial of xiidra drops for dry eyes, severe amblyopia  11/30/18  neurology note headache imaging negative, no evidence of autoimmune disease although markers will be checked, associated vision changes, will schedule EEG  4/29/19 EEG video negative  5/7/19 neurology note daily headache, no criteria for migraine, trial of anaprox 550 mg as needed up to 3 times daily, visual changes no clear etiology, very infrequent, could be migrainous epiphenomena, do not believe medication is worthwhile, consider ambulatory EEG in the future  9/27/21 left-sided facial tingling diagnosed with trigeminal neuralgia     History vision changes  4/29/19 EEG video negative  5/7/19 neurology note daily headache, no criteria for migraine, trial of anaprox 550 mg as needed up to 3 times daily, visual changes no clear etiology, very infrequent, could be migrainous epiphenomena, do not believe medication is worthwhile, consider ambulatory EEG in the future     History wrist pain  8/25/20 occupational medicine note right wrist burning sensation, x-ray ordered  8/25/20 x-ray right wrist negative  11/4/20 occupational medicine note MRI ordered, consider physiatry evaluation  11/19/20 physical therapy note  11/23/20 MRI right wrist without, dorsal radial ulnar strut and proximal triangular fibrocartilage tears with associated ganglion cyst 12 x 8 x 3 mm under the extensor carpi ulnaris, partial scapholunate ligament tear volar surface with 11 mm volar radial ganglion cyst  12/2/20 occupational medicine note referral to hand surgery  1/18/21  " orthopedic note injection [facility] tendon sheath performed, continue work without limitations, follow-up 4 weeks  2/22/21  orthopedic note follow-up [facility] tendon sheath injection 1 month ago, wrist is doing well, close workmen's compensation case, could return to full duty     Low back pain  12/15/21 most recent flareup started by bending over and putting on his shoe  1/22/22 referral physical therapy, consider imaging, continue over-the-counter ibuprofen, trial of zanaflex, no need for imaging now, will complete FMLA  3/14/22 going to PT, on motrin 2 tabs prn, has not tried zanaflex yet, will try to compare the benefit with motrin  Tried skelaxin and flexeril previously     Preventative health  7/5/16 apnea link negative  6/7/19 colonoscopy per [facility] normal mucosa 2 mm cecum polyp pathology normal mucosa  4/7/22 testosterone 219  4/19/22 hep b third  11/20/23 tdap  9/24/24 vit d 24  9/24/24 hep c ab negative  9/24/24 A1c 5.7%  9/24/24 psa 1.0                         Patient Active Problem List   Diagnosis    Family history of brain aneurysm    Preventative health care    Dyslipidemia    Erectile dysfunction    Refractive amblyopia    Allergic rhinitis    History of headache    History of visual changes    GERD (gastroesophageal reflux disease)    History of wrist pain    Low back pain         Depression Screening  Little interest or pleasure in doing things?  0 - not at all  Feeling down, depressed , or hopeless? 0 - not at all  Patient Health Questionnaire Score: 0                Patient Care Team:  Dheeraj Burr M.D. as PCP - General (Internal Medicine)      ROS           Objective     /76   Pulse 69   Temp 36.1 °C (97 °F) (Temporal)   Ht 1.854 m (6' 1\")   Wt 102 kg (225 lb 9.6 oz)   SpO2 96%   BMI 29.76 kg/m²      Physical Exam  Vitals and nursing note reviewed.   Constitutional:       General: He is not in acute distress.     Appearance: Normal appearance.   HENT:      Head: " Normocephalic and atraumatic.      Right Ear: External ear normal.      Left Ear: External ear normal.      Ears:      Comments: No TMJ tenderness     Nose: Nose normal.   Eyes:      Conjunctiva/sclera: Conjunctivae normal.   Cardiovascular:      Rate and Rhythm: Normal rate and regular rhythm.   Pulmonary:      Effort: Pulmonary effort is normal. No respiratory distress.      Breath sounds: Normal breath sounds. No wheezing.   Abdominal:      General: Abdomen is flat. There is no distension.      Palpations: Abdomen is soft.      Tenderness: There is no abdominal tenderness. There is no guarding or rebound.   Musculoskeletal:         General: No swelling.      Cervical back: Neck supple. No rigidity.   Lymphadenopathy:      Cervical: No cervical adenopathy.   Skin:     Coloration: Skin is not jaundiced.      Findings: No bruising.   Neurological:      Mental Status: He is alert.   Psychiatric:         Mood and Affect: Mood normal.         Behavior: Behavior normal.          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, this may have been 2-4 times a month and when this does occur will last the entire day such that he needs to be off work    #2 dyspepsia with some reflux-like symptoms, sometimes it can be related to posture, caffeine, or meals he has tried Pepcid without benefit, has had Prilosec in the past and he would prefer not to take Prilosec long-term if possible, minimal use of NSAIDs for the headache, no alcohol, does enjoy a soda once or twice per week    #3 BMI 29.76 a bit of weight gain during the holidays and he has not been exercising regularly    #4 dyslipidemia diet controlled 9/24/24 chol 247,trig 212,hdl  49,ldl 156    Plan  #1 FMLA form completed for chronic headaches and neck pain, 4 days off per month, try Tylenol first and try to minimize NSAID use    #2 ultrasound right upper quadrant for dyspepsia    #3 referral back to GI consultants    #4 patient just had CBC, CMP, standard labs September 24, will hold off on further labs pending ultrasound    #5 Cologuard as it is 5 years after his last colonoscopy as he is due for repeat colonoscopy in another 5 years    #6 recommend getting the COVID-vaccine at the pharmacy, and the shingles vaccine at the pharmacy    #7 referral back to [facility] for chronic neck and back pain    #8 offered Prevnar 20 based upon CDC guidelines, he declines for now    #9 try to minimize processed foods, fried fatty foods, sodas, continue alcohol    #10 start Prilosec 20 mg daily take with water only, 30 minutes before meals, medications, coffee, supplements in the morning    #11 he can send me a MyChart update in a few weeks to see if the omeprazole has been beneficial

## 2025-01-03 ENCOUNTER — PHARMACY VISIT (OUTPATIENT)
Dept: PHARMACY | Facility: MEDICAL CENTER | Age: 50
End: 2025-01-03
Payer: COMMERCIAL

## 2025-01-03 PROCEDURE — RXMED WILLOW AMBULATORY MEDICATION CHARGE: Performed by: INTERNAL MEDICINE

## 2025-01-03 RX ORDER — COVID-19 VACCINE, MRNA 0.04 MG/.418ML
INJECTION, SUSPENSION INTRAMUSCULAR
Qty: 0.3 ML | Refills: 0 | OUTPATIENT
Start: 2025-01-03

## 2025-01-14 ENCOUNTER — TELEPHONE (OUTPATIENT)
Dept: MEDICAL GROUP | Facility: MEDICAL CENTER | Age: 50
End: 2025-01-14
Payer: COMMERCIAL

## 2025-01-14 LAB — NONINV COLON CA DNA+OCC BLD SCRN STL QL: NORMAL

## 2025-01-14 NOTE — TELEPHONE ENCOUNTER
Please notify the patient that the Cologuard stool test was not able to be processed so they will contact him to arrange a new sample collection.

## 2025-01-23 ENCOUNTER — HOSPITAL ENCOUNTER (OUTPATIENT)
Dept: RADIOLOGY | Facility: MEDICAL CENTER | Age: 50
End: 2025-01-23
Attending: INTERNAL MEDICINE
Payer: COMMERCIAL

## 2025-01-23 DIAGNOSIS — R10.13 DYSPEPSIA: ICD-10-CM

## 2025-01-23 PROCEDURE — 76705 ECHO EXAM OF ABDOMEN: CPT

## 2025-03-27 LAB — NONINV COLON CA DNA+OCC BLD SCRN STL QL: NEGATIVE

## 2025-03-28 ENCOUNTER — RESULTS FOLLOW-UP (OUTPATIENT)
Dept: MEDICAL GROUP | Facility: MEDICAL CENTER | Age: 50
End: 2025-03-28
Payer: COMMERCIAL

## 2025-05-15 ENCOUNTER — TELEPHONE (OUTPATIENT)
Dept: MEDICAL GROUP | Facility: MEDICAL CENTER | Age: 50
End: 2025-05-15
Payer: COMMERCIAL

## 2025-08-11 ENCOUNTER — APPOINTMENT (OUTPATIENT)
Dept: URBAN - METROPOLITAN AREA CLINIC 4 | Facility: CLINIC | Age: 50
Setting detail: DERMATOLOGY
End: 2025-08-11

## 2025-08-11 DIAGNOSIS — D18.0 HEMANGIOMA: ICD-10-CM

## 2025-08-11 DIAGNOSIS — L82.0 INFLAMED SEBORRHEIC KERATOSIS: ICD-10-CM

## 2025-08-11 DIAGNOSIS — Z71.89 OTHER SPECIFIED COUNSELING: ICD-10-CM

## 2025-08-11 DIAGNOSIS — L259 CONTACT DERMATITIS AND OTHER ECZEMA, UNSPECIFIED CAUSE: ICD-10-CM

## 2025-08-11 DIAGNOSIS — L81.4 OTHER MELANIN HYPERPIGMENTATION: ICD-10-CM

## 2025-08-11 DIAGNOSIS — D22 MELANOCYTIC NEVI: ICD-10-CM

## 2025-08-11 DIAGNOSIS — L82.1 OTHER SEBORRHEIC KERATOSIS: ICD-10-CM

## 2025-08-11 PROBLEM — D18.01 HEMANGIOMA OF SKIN AND SUBCUTANEOUS TISSUE: Status: ACTIVE | Noted: 2025-08-11

## 2025-08-11 PROBLEM — D22.62 MELANOCYTIC NEVI OF LEFT UPPER LIMB, INCLUDING SHOULDER: Status: ACTIVE | Noted: 2025-08-11

## 2025-08-11 PROBLEM — D22.61 MELANOCYTIC NEVI OF RIGHT UPPER LIMB, INCLUDING SHOULDER: Status: ACTIVE | Noted: 2025-08-11

## 2025-08-11 PROBLEM — D22.5 MELANOCYTIC NEVI OF TRUNK: Status: ACTIVE | Noted: 2025-08-11

## 2025-08-11 PROBLEM — L30.8 OTHER SPECIFIED DERMATITIS: Status: ACTIVE | Noted: 2025-08-11

## 2025-08-11 PROBLEM — D22.39 MELANOCYTIC NEVI OF OTHER PARTS OF FACE: Status: ACTIVE | Noted: 2025-08-11

## 2025-08-11 PROBLEM — D22.72 MELANOCYTIC NEVI OF LEFT LOWER LIMB, INCLUDING HIP: Status: ACTIVE | Noted: 2025-08-11

## 2025-08-11 PROBLEM — D22.71 MELANOCYTIC NEVI OF RIGHT LOWER LIMB, INCLUDING HIP: Status: ACTIVE | Noted: 2025-08-11

## 2025-08-11 PROCEDURE — ? MEDICATION COUNSELING

## 2025-08-11 PROCEDURE — ? LIQUID NITROGEN

## 2025-08-11 PROCEDURE — ? PRESCRIPTION

## 2025-08-11 PROCEDURE — ? COUNSELING

## 2025-08-11 PROCEDURE — ? OBSERVATION

## 2025-08-11 PROCEDURE — ? SUNSCREEN RECOMMENDATIONS

## 2025-08-11 RX ORDER — TRIAMCINOLONE ACETONIDE 1 MG/G
CREAM TOPICAL BID
Qty: 30 | Refills: 0 | Status: ERX

## 2025-08-11 ASSESSMENT — LOCATION DETAILED DESCRIPTION DERM
LOCATION DETAILED: LEFT LATERAL PROXIMAL PRETIBIAL REGION
LOCATION DETAILED: RIGHT SUPERIOR UPPER BACK
LOCATION DETAILED: RIGHT INFERIOR LATERAL FOREHEAD
LOCATION DETAILED: LEFT VENTRAL PROXIMAL FOREARM
LOCATION DETAILED: RIGHT INFERIOR MEDIAL UPPER BACK
LOCATION DETAILED: RIGHT DISTAL POSTERIOR UPPER ARM
LOCATION DETAILED: LEFT DISTAL POSTERIOR THIGH
LOCATION DETAILED: RIGHT INFERIOR MEDIAL MIDBACK
LOCATION DETAILED: INFERIOR THORACIC SPINE
LOCATION DETAILED: RIGHT PROXIMAL PRETIBIAL REGION
LOCATION DETAILED: LEFT CENTRAL TEMPLE
LOCATION DETAILED: RIGHT INFERIOR FOREHEAD
LOCATION DETAILED: LEFT PROXIMAL POSTERIOR UPPER ARM
LOCATION DETAILED: RIGHT PROXIMAL DORSAL FOREARM
LOCATION DETAILED: RIGHT MID TEMPLE
LOCATION DETAILED: LEFT FOREHEAD
LOCATION DETAILED: RIGHT VENTRAL PROXIMAL FOREARM
LOCATION DETAILED: RIGHT DISTAL POSTERIOR THIGH
LOCATION DETAILED: RIGHT INFERIOR CENTRAL MALAR CHEEK
LOCATION DETAILED: RIGHT MEDIAL DORSAL FOOT
LOCATION DETAILED: RIGHT ANKLE

## 2025-08-11 ASSESSMENT — LOCATION ZONE DERM
LOCATION ZONE: FACE
LOCATION ZONE: FEET
LOCATION ZONE: TRUNK
LOCATION ZONE: LEG
LOCATION ZONE: ARM

## 2025-08-11 ASSESSMENT — LOCATION SIMPLE DESCRIPTION DERM
LOCATION SIMPLE: LEFT POSTERIOR UPPER ARM
LOCATION SIMPLE: UPPER BACK
LOCATION SIMPLE: LEFT POSTERIOR THIGH
LOCATION SIMPLE: LEFT TEMPLE
LOCATION SIMPLE: RIGHT POSTERIOR UPPER ARM
LOCATION SIMPLE: RIGHT LOWER BACK
LOCATION SIMPLE: RIGHT POSTERIOR THIGH
LOCATION SIMPLE: RIGHT PRETIBIAL REGION
LOCATION SIMPLE: LEFT FOREARM
LOCATION SIMPLE: RIGHT FOREHEAD
LOCATION SIMPLE: RIGHT FOOT
LOCATION SIMPLE: RIGHT TEMPLE
LOCATION SIMPLE: RIGHT FOREARM
LOCATION SIMPLE: RIGHT UPPER BACK
LOCATION SIMPLE: RIGHT ANKLE
LOCATION SIMPLE: LEFT PRETIBIAL REGION
LOCATION SIMPLE: RIGHT CHEEK
LOCATION SIMPLE: LEFT FOREHEAD